# Patient Record
Sex: FEMALE | Race: WHITE | Employment: OTHER | URBAN - METROPOLITAN AREA
[De-identification: names, ages, dates, MRNs, and addresses within clinical notes are randomized per-mention and may not be internally consistent; named-entity substitution may affect disease eponyms.]

---

## 2017-03-28 ENCOUNTER — HOSPITAL ENCOUNTER (OUTPATIENT)
Dept: CARDIOLOGY | Age: 82
Discharge: HOME | End: 2017-03-28
Payer: MEDICARE

## 2017-03-28 PROCEDURE — 93280 PM DEVICE PROGR EVAL DUAL: CPT

## 2017-08-23 ENCOUNTER — HOSPITAL ENCOUNTER (OUTPATIENT)
Dept: CARDIOLOGY | Age: 82
Discharge: HOME | End: 2017-08-23
Payer: MEDICARE

## 2017-08-23 PROCEDURE — 93296 REM INTERROG EVL PM/IDS: CPT

## 2017-11-21 ENCOUNTER — HOSPITAL ENCOUNTER (OUTPATIENT)
Dept: CARDIOLOGY | Age: 82
Discharge: HOME | End: 2017-11-21
Payer: MEDICARE

## 2017-11-21 PROCEDURE — 93280 PM DEVICE PROGR EVAL DUAL: CPT

## 2018-02-21 ENCOUNTER — HOSPITAL ENCOUNTER (OUTPATIENT)
Dept: CARDIOLOGY | Age: 83
Discharge: HOME | End: 2018-02-21
Payer: MEDICARE

## 2018-02-21 PROCEDURE — 93296 REM INTERROG EVL PM/IDS: CPT

## 2018-05-23 ENCOUNTER — HOSPITAL ENCOUNTER (OUTPATIENT)
Dept: CARDIOLOGY | Age: 83
Discharge: HOME | End: 2018-05-23
Payer: MEDICARE

## 2018-05-23 PROCEDURE — 93280 PM DEVICE PROGR EVAL DUAL: CPT

## 2018-07-17 ENCOUNTER — OFFICE VISIT (OUTPATIENT)
Dept: CARDIOLOGY CLINIC | Age: 83
End: 2018-07-17
Payer: MEDICARE

## 2018-07-17 VITALS
WEIGHT: 116 LBS | HEIGHT: 64 IN | OXYGEN SATURATION: 96 % | HEART RATE: 85 BPM | BODY MASS INDEX: 19.81 KG/M2 | SYSTOLIC BLOOD PRESSURE: 128 MMHG | DIASTOLIC BLOOD PRESSURE: 64 MMHG | RESPIRATION RATE: 16 BRPM

## 2018-07-17 DIAGNOSIS — I25.5 ISCHEMIC CARDIOMYOPATHY: ICD-10-CM

## 2018-07-17 DIAGNOSIS — R09.89 BRUIT OF RIGHT CAROTID ARTERY: ICD-10-CM

## 2018-07-17 DIAGNOSIS — E78.00 PURE HYPERCHOLESTEROLEMIA: ICD-10-CM

## 2018-07-17 DIAGNOSIS — I25.10 CORONARY ARTERY DISEASE INVOLVING NATIVE CORONARY ARTERY OF NATIVE HEART WITHOUT ANGINA PECTORIS: Primary | ICD-10-CM

## 2018-07-17 DIAGNOSIS — I10 ESSENTIAL HYPERTENSION: ICD-10-CM

## 2018-07-17 PROCEDURE — 1036F TOBACCO NON-USER: CPT | Performed by: INTERNAL MEDICINE

## 2018-07-17 PROCEDURE — 1090F PRES/ABSN URINE INCON ASSESS: CPT | Performed by: INTERNAL MEDICINE

## 2018-07-17 PROCEDURE — G8599 NO ASA/ANTIPLAT THER USE RNG: HCPCS | Performed by: INTERNAL MEDICINE

## 2018-07-17 PROCEDURE — 1123F ACP DISCUSS/DSCN MKR DOCD: CPT | Performed by: INTERNAL MEDICINE

## 2018-07-17 PROCEDURE — G8427 DOCREV CUR MEDS BY ELIG CLIN: HCPCS | Performed by: INTERNAL MEDICINE

## 2018-07-17 PROCEDURE — 99204 OFFICE O/P NEW MOD 45 MIN: CPT | Performed by: INTERNAL MEDICINE

## 2018-07-17 PROCEDURE — 4040F PNEUMOC VAC/ADMIN/RCVD: CPT | Performed by: INTERNAL MEDICINE

## 2018-07-17 PROCEDURE — 1101F PT FALLS ASSESS-DOCD LE1/YR: CPT | Performed by: INTERNAL MEDICINE

## 2018-07-17 PROCEDURE — G8420 CALC BMI NORM PARAMETERS: HCPCS | Performed by: INTERNAL MEDICINE

## 2018-07-17 RX ORDER — PAROXETINE HYDROCHLORIDE 20 MG/1
20 TABLET, FILM COATED ORAL NIGHTLY
Status: ON HOLD | COMMUNITY
Start: 2017-10-21 | End: 2021-08-09

## 2018-07-17 RX ORDER — LOVASTATIN 40 MG/1
TABLET ORAL
COMMUNITY
End: 2019-04-24

## 2018-07-17 RX ORDER — GABAPENTIN 100 MG/1
100 CAPSULE ORAL 2 TIMES DAILY
Status: ON HOLD | COMMUNITY
Start: 2018-05-29 | End: 2021-08-09

## 2018-07-17 RX ORDER — CARVEDILOL 3.12 MG/1
3.12 TABLET ORAL 2 TIMES DAILY
Status: ON HOLD | COMMUNITY
Start: 2018-06-05 | End: 2021-08-19 | Stop reason: HOSPADM

## 2018-07-17 RX ORDER — EZETIMIBE 10 MG/1
10 TABLET ORAL DAILY
Status: ON HOLD | COMMUNITY
Start: 2018-06-04 | End: 2021-08-09

## 2018-07-30 NOTE — PROGRESS NOTES
[facility] CARDIOLOGY OFFICE FOLLOW-UP      Patient: Arturo De La Torre  YOB: 1928  MRN: [id number]    Chief Complaint:  Chief Complaint   Patient presents with    Coronary Artery Disease     Previous patient       Subjective/HPI    The patient is followed in the office chronically for the following problems: CAD, remote multivessel PCI, SSS S/P PPM, ICM EF 40%, HTN, HPL, LVH    The last office visit focused on the following: first visit in several years, old [address] patient    Since the last office visit, the patient has not had new symptoms/events. No past medical history on file. No past surgical history on file. No family history on file. Social History     Social History    Marital status:      Spouse name: N/A    Number of children: N/A    Years of education: N/A     Social History Main Topics    Smoking status: Former Smoker    Smokeless tobacco: Never Used    Alcohol use None    Drug use: Unknown    Sexual activity: Not Asked     Other Topics Concern    None     Social History Narrative    None       Allergies   Allergen Reactions    Eggs Or Egg-Derived Products     Tylenol [Acetaminophen]        Current Outpatient Prescriptions   Medication Sig Dispense Refill    vitamin D 1000 units CAPS Take 2,000 Units by mouth      ezetimibe (ZETIA) 10 MG tablet Take 10 mg by mouth      metFORMIN (GLUCOPHAGE) 1000 MG tablet Take 1,000 mg by mouth      SITagliptin (JANUVIA) 100 MG tablet Take 100 mg by mouth      insulin glargine (LANTUS) 100 UNIT/ML injection pen INJECT 15 UNITS TWICE A DAY UNDER THE SKIN      lovastatin (MEVACOR) 40 MG tablet Take 0.5 tablet by mouth once daily.  gabapentin (NEURONTIN) 100 MG capsule One in am , 1 in pm and 3 at night.  PARoxetine (PAXIL) 20 MG tablet Take 20 mg by mouth      carvedilol (COREG) 3.125 MG tablet 3.125 mg 2 times daily (with meals)        No current facility-administered medications for this visit.         Review of Systems:   Review of Systems   Constitutional: Negative for activity change and appetite change. HENT: Negative for congestion. Respiratory: Negative for apnea, choking and chest tightness. Cardiovascular: Negative for chest pain. Gastrointestinal: Negative for abdominal distention and abdominal pain. Endocrine: Negative for cold intolerance and heat intolerance. Genitourinary: Negative for dysuria and enuresis. Musculoskeletal: Negative for arthralgias and back pain. Skin: Negative for color change. Allergic/Immunologic: Negative. Neurological: Negative for dizziness, seizures, syncope and light-headedness. Psychiatric/Behavioral: Negative for agitation, behavioral problems and confusion. Physical Examination:    /64 (Site: Left Arm, Position: Sitting, Cuff Size: Large Adult)   Pulse 85   Resp 16   Ht 5' 4\" (1.626 m)   Wt 116 lb (52.6 kg)   SpO2 96%   BMI 19.91 kg/m²    Physical Exam   Constitutional: The patient appears healthy. No distress. HENT: Mouth/Throat: Oropharynx is clear. Eyes: Pupils are equal, round, and reactive to light. Neck: Normal range of motion. No JVD present. Cardiovascular: Regular rhythm, S1 normal, S2 normal, normal heart sounds and intact distal pulses. Exam reveals no gallop. No murmur heard. Pulses:       Radial pulses are 2+ on the right side. Dorsalis pedis pulses are 2+ on the right side. Pulmonary/Chest: Effort normal and breath sounds normal. No wheezes. No rales. No tenderness. Abdominal: Soft. Bowel sounds are normal.   Musculoskeletal: Normal range of motion. No edema. Neurological: The patient is alert and oriented to person, place, and time. Intact cranial nerves. Skin: Skin is warm and dry. No rash noted.        LABS:  CBC:   Lab Results   Component Value Date    WBC 7.2 07/15/2016    RBC 4.28 07/15/2016    HGB 12.0 07/15/2016    HCT 37.2 07/15/2016    MCV 87.1 07/15/2016    MCH 28.1 07/15/2016    [facility] 32.3 07/15/2016    RDW 13.5 07/15/2016     07/15/2016    MPV 7.5 01/31/2014     Lipids:  Lab Results   Component Value Date    CHOL 164 06/14/2016    CHOL 168 05/31/2016     Lab Results   Component Value Date    TRIG 227 (H) 06/14/2016    TRIG 184 05/31/2016     Lab Results   Component Value Date    HDL 42 06/14/2016    HDL 45 05/31/2016     Lab Results   Component Value Date    LDLCALC 77 06/14/2016    LDLCALC 86 05/31/2016     No results found for: LABVLDL, VLDL  No results found for: CHOLHDLRATIO  CMP:    Lab Results   Component Value Date     07/15/2016    K 4.4 07/15/2016    CL 88 07/15/2016    CO2 25 07/15/2016    BUN 10 07/15/2016    CREATININE 1.01 07/15/2016    GFRAA >60.0 07/15/2016    LABGLOM 51.7 07/15/2016    GLUCOSE 105 06/29/2016    PROT 7.3 06/14/2016    LABALBU 4.3 06/29/2016    CALCIUM 10.3 06/29/2016    BILITOT 0.3 06/14/2016    ALKPHOS 87 06/14/2016    AST 23 06/14/2016    ALT 10 06/14/2016     BMP:    Lab Results   Component Value Date     07/15/2016    K 4.4 07/15/2016    CL 88 07/15/2016    CO2 25 07/15/2016    BUN 10 07/15/2016    LABALBU 4.3 06/29/2016    CREATININE 1.01 07/15/2016    CALCIUM 10.3 06/29/2016    GFRAA >60.0 07/15/2016    LABGLOM 51.7 07/15/2016    GLUCOSE 105 06/29/2016     Magnesium:    Lab Results   Component Value Date    MG 2.0 06/14/2016     TSH:  Lab Results   Component Value Date    TSH 4.900 (H) 06/14/2016       Patient Active Problem List   Diagnosis    Ischemic cardiomyopathy    Coronary artery disease involving native coronary artery of native heart without angina pectoris    Essential hypertension    Pure hypercholesterolemia       Assessment/Plan:    1. Coronary artery disease involving native coronary artery of native heart without angina pectoris  Coronary artery disease: This patient has known CAD and remote PCI. Currently the angina class is I.   The patient will be treated with guideline-directed therapy including aspirin, statin,

## 2018-07-31 ENCOUNTER — HOSPITAL ENCOUNTER (OUTPATIENT)
Dept: ULTRASOUND IMAGING | Age: 83
Discharge: HOME | End: 2018-08-02
Payer: MEDICARE

## 2018-07-31 DIAGNOSIS — R09.89 BRUIT OF RIGHT CAROTID ARTERY: ICD-10-CM

## 2018-07-31 PROCEDURE — 93880 EXTRACRANIAL BILAT STUDY: CPT

## 2018-08-23 ENCOUNTER — HOSPITAL ENCOUNTER (OUTPATIENT)
Dept: CARDIOLOGY | Age: 83
Discharge: HOME | End: 2018-08-23
Payer: MEDICARE

## 2018-08-23 PROCEDURE — 93296 REM INTERROG EVL PM/IDS: CPT

## 2018-11-20 ENCOUNTER — HOSPITAL ENCOUNTER (OUTPATIENT)
Dept: CARDIOLOGY | Age: 83
Discharge: HOME | End: 2018-11-20
Payer: MEDICARE

## 2018-11-20 PROCEDURE — 93280 PM DEVICE PROGR EVAL DUAL: CPT

## 2019-01-23 ENCOUNTER — OFFICE VISIT (OUTPATIENT)
Dept: CARDIOLOGY CLINIC | Age: 84
End: 2019-01-23
Payer: MEDICARE

## 2019-01-23 VITALS
DIASTOLIC BLOOD PRESSURE: 58 MMHG | RESPIRATION RATE: 16 BRPM | SYSTOLIC BLOOD PRESSURE: 110 MMHG | OXYGEN SATURATION: 99 % | BODY MASS INDEX: 18.61 KG/M2 | WEIGHT: 109 LBS | HEART RATE: 88 BPM | HEIGHT: 64 IN

## 2019-01-23 DIAGNOSIS — I10 ESSENTIAL HYPERTENSION: ICD-10-CM

## 2019-01-23 DIAGNOSIS — M79.671 FOOT PAIN, BILATERAL: Primary | ICD-10-CM

## 2019-01-23 DIAGNOSIS — M79.672 FOOT PAIN, BILATERAL: Primary | ICD-10-CM

## 2019-01-23 DIAGNOSIS — E78.00 PURE HYPERCHOLESTEROLEMIA: ICD-10-CM

## 2019-01-23 DIAGNOSIS — I25.5 ISCHEMIC CARDIOMYOPATHY: ICD-10-CM

## 2019-01-23 DIAGNOSIS — I25.10 CORONARY ARTERY DISEASE INVOLVING NATIVE CORONARY ARTERY OF NATIVE HEART WITHOUT ANGINA PECTORIS: ICD-10-CM

## 2019-01-23 PROCEDURE — 1090F PRES/ABSN URINE INCON ASSESS: CPT | Performed by: INTERNAL MEDICINE

## 2019-01-23 PROCEDURE — 1123F ACP DISCUSS/DSCN MKR DOCD: CPT | Performed by: INTERNAL MEDICINE

## 2019-01-23 PROCEDURE — 99213 OFFICE O/P EST LOW 20 MIN: CPT | Performed by: INTERNAL MEDICINE

## 2019-01-23 PROCEDURE — G8599 NO ASA/ANTIPLAT THER USE RNG: HCPCS | Performed by: INTERNAL MEDICINE

## 2019-01-23 PROCEDURE — G8420 CALC BMI NORM PARAMETERS: HCPCS | Performed by: INTERNAL MEDICINE

## 2019-01-23 PROCEDURE — 1036F TOBACCO NON-USER: CPT | Performed by: INTERNAL MEDICINE

## 2019-01-23 PROCEDURE — 1101F PT FALLS ASSESS-DOCD LE1/YR: CPT | Performed by: INTERNAL MEDICINE

## 2019-01-23 PROCEDURE — 4040F PNEUMOC VAC/ADMIN/RCVD: CPT | Performed by: INTERNAL MEDICINE

## 2019-01-23 PROCEDURE — G8484 FLU IMMUNIZE NO ADMIN: HCPCS | Performed by: INTERNAL MEDICINE

## 2019-01-23 PROCEDURE — G8427 DOCREV CUR MEDS BY ELIG CLIN: HCPCS | Performed by: INTERNAL MEDICINE

## 2019-01-23 RX ORDER — FERROUS SULFATE 325(65) MG
325 TABLET ORAL
Status: ON HOLD | COMMUNITY
End: 2021-08-09

## 2019-01-23 RX ORDER — THIAMINE HCL 100 MG
500 TABLET ORAL DAILY
Status: ON HOLD | COMMUNITY
End: 2021-08-09

## 2019-02-06 ENCOUNTER — HOSPITAL ENCOUNTER (OUTPATIENT)
Dept: ULTRASOUND IMAGING | Age: 84
Discharge: HOME | End: 2019-02-08
Payer: MEDICARE

## 2019-02-06 DIAGNOSIS — M79.671 FOOT PAIN, BILATERAL: ICD-10-CM

## 2019-02-06 DIAGNOSIS — M79.672 FOOT PAIN, BILATERAL: ICD-10-CM

## 2019-02-06 PROCEDURE — 93924 LWR XTR VASC STDY BILAT: CPT

## 2019-02-06 PROCEDURE — 93924 LWR XTR VASC STDY BILAT: CPT | Performed by: INTERNAL MEDICINE

## 2019-03-26 ENCOUNTER — TELEPHONE (OUTPATIENT)
Dept: CARDIOLOGY CLINIC | Age: 84
End: 2019-03-26

## 2019-04-24 ENCOUNTER — OFFICE VISIT (OUTPATIENT)
Dept: CARDIOLOGY CLINIC | Age: 84
End: 2019-04-24
Payer: MEDICARE

## 2019-04-24 VITALS
HEIGHT: 64 IN | WEIGHT: 108 LBS | DIASTOLIC BLOOD PRESSURE: 58 MMHG | SYSTOLIC BLOOD PRESSURE: 132 MMHG | OXYGEN SATURATION: 98 % | HEART RATE: 88 BPM | RESPIRATION RATE: 16 BRPM | BODY MASS INDEX: 18.44 KG/M2

## 2019-04-24 DIAGNOSIS — E78.00 PURE HYPERCHOLESTEROLEMIA: ICD-10-CM

## 2019-04-24 DIAGNOSIS — I25.10 CORONARY ARTERY DISEASE INVOLVING NATIVE CORONARY ARTERY OF NATIVE HEART WITHOUT ANGINA PECTORIS: ICD-10-CM

## 2019-04-24 DIAGNOSIS — R09.89 BRUIT OF RIGHT CAROTID ARTERY: ICD-10-CM

## 2019-04-24 DIAGNOSIS — I10 ESSENTIAL HYPERTENSION: ICD-10-CM

## 2019-04-24 DIAGNOSIS — M79.672 FOOT PAIN, BILATERAL: ICD-10-CM

## 2019-04-24 DIAGNOSIS — I25.5 ISCHEMIC CARDIOMYOPATHY: Primary | ICD-10-CM

## 2019-04-24 DIAGNOSIS — M79.671 FOOT PAIN, BILATERAL: ICD-10-CM

## 2019-04-24 PROCEDURE — 1090F PRES/ABSN URINE INCON ASSESS: CPT | Performed by: INTERNAL MEDICINE

## 2019-04-24 PROCEDURE — G8427 DOCREV CUR MEDS BY ELIG CLIN: HCPCS | Performed by: INTERNAL MEDICINE

## 2019-04-24 PROCEDURE — G8599 NO ASA/ANTIPLAT THER USE RNG: HCPCS | Performed by: INTERNAL MEDICINE

## 2019-04-24 PROCEDURE — 99213 OFFICE O/P EST LOW 20 MIN: CPT | Performed by: INTERNAL MEDICINE

## 2019-04-24 PROCEDURE — 1123F ACP DISCUSS/DSCN MKR DOCD: CPT | Performed by: INTERNAL MEDICINE

## 2019-04-24 PROCEDURE — 1036F TOBACCO NON-USER: CPT | Performed by: INTERNAL MEDICINE

## 2019-04-24 PROCEDURE — 4040F PNEUMOC VAC/ADMIN/RCVD: CPT | Performed by: INTERNAL MEDICINE

## 2019-04-24 PROCEDURE — G8420 CALC BMI NORM PARAMETERS: HCPCS | Performed by: INTERNAL MEDICINE

## 2019-04-24 RX ORDER — LOVASTATIN 20 MG/1
20 TABLET ORAL NIGHTLY
Status: ON HOLD | COMMUNITY
End: 2021-08-09

## 2019-04-24 NOTE — PROGRESS NOTES
[facility] CARDIOLOGY OFFICE FOLLOW-UP      Patient: Tayo Wells  YOB: 1928  MRN: [id number]    Chief Complaint:  Chief Complaint   Patient presents with    Foot Pain     Review ADONAY       Subjective/HPI    The patient is followed in the cardiology office chronically for the following problems: CAD, remote multivessel PCI, HTN, HPL, DM, ICM EF 40%, sss S/P PPM    The last encounter focused on the following: foot pain    Testing/hospitalizations/procedures performed since last encounter: ADONAY with below knee disease    Since the last encounter, the patient has not had new symptoms/events. No past medical history on file. No past surgical history on file. No family history on file. Social History     Socioeconomic History    Marital status:       Spouse name: None    Number of children: None    Years of education: None    Highest education level: None   Occupational History    None   Social Needs    Financial resource strain: None    Food insecurity:     Worry: None     Inability: None    Transportation needs:     Medical: None     Non-medical: None   Tobacco Use    Smoking status: Former Smoker    Smokeless tobacco: Never Used   Substance and Sexual Activity    Alcohol use: None    Drug use: None    Sexual activity: None   Lifestyle    Physical activity:     Days per week: None     Minutes per session: None    Stress: None   Relationships    Social connections:     Talks on phone: None     Gets together: None     Attends Protestant service: None     Active member of club or organization: None     Attends meetings of clubs or organizations: None     Relationship status: None    Intimate partner violence:     Fear of current or ex partner: None     Emotionally abused: None     Physically abused: None     Forced sexual activity: None   Other Topics Concern    None   Social History Narrative    None       Allergies   Allergen Reactions    Eggs Or Egg-Derived Products  Tylenol [Acetaminophen]        Current Outpatient Medications   Medication Sig Dispense Refill    lovastatin (MEVACOR) 20 MG tablet Take 20 mg by mouth nightly      magnesium (MAGNESIUM-OXIDE) 250 MG TABS tablet Take 250 mg by mouth daily      ferrous sulfate 325 (65 Fe) MG tablet Take 325 mg by mouth daily (with breakfast)      vitamin D 1000 units CAPS Take 2,000 Units by mouth      ezetimibe (ZETIA) 10 MG tablet Take 10 mg by mouth      metFORMIN (GLUCOPHAGE) 1000 MG tablet Take 1,000 mg by mouth 2 times daily (with meals)       SITagliptin (JANUVIA) 100 MG tablet Take 100 mg by mouth      insulin glargine (LANTUS) 100 UNIT/ML injection pen INJECT 10 UNITS  A DAY UNDER THE SKIN      gabapentin (NEURONTIN) 100 MG capsule One in am , 1 in pm and 3 at night.  PARoxetine (PAXIL) 20 MG tablet Take 20 mg by mouth      carvedilol (COREG) 3.125 MG tablet 3.125 mg 2 times daily (with meals)        No current facility-administered medications for this visit. Review of Systems:   Review of Systems   Constitutional: Negative for activity change and appetite change. HENT: Negative for congestion. Respiratory: Negative for apnea, choking and chest tightness. Cardiovascular: Negative for chest pain. Gastrointestinal: Negative for abdominal distention and abdominal pain. Endocrine: Negative for cold intolerance and heat intolerance. Genitourinary: Negative for dysuria and enuresis. Musculoskeletal: Negative for arthralgias and back pain. Skin: Negative for color change. Allergic/Immunologic: Negative. Neurological: Negative for dizziness, seizures, syncope and light-headedness. Psychiatric/Behavioral: Negative for agitation, behavioral problems and confusion.        Physical Examination:    BP (!) 132/58 (Site: Left Upper Arm, Position: Sitting, Cuff Size: Large Adult)   Pulse 88   Resp 16   Ht 5' 4\" (1.626 m)   Wt 108 lb (49 kg)   SpO2 98%   BMI 18.54 kg/m²    Physical BMP:    Lab Results   Component Value Date     07/15/2016    K 4.4 07/15/2016    CL 88 07/15/2016    CO2 25 07/15/2016    BUN 10 07/15/2016    LABALBU 4.3 06/29/2016    CREATININE 1.01 07/15/2016    CALCIUM 10.3 06/29/2016    GFRAA >60.0 07/15/2016    LABGLOM 51.7 07/15/2016    GLUCOSE 105 06/29/2016     Magnesium:    Lab Results   Component Value Date    MG 2.0 06/14/2016     TSH:  Lab Results   Component Value Date    TSH 4.900 (H) 06/14/2016       Patient Active Problem List   Diagnosis    Ischemic cardiomyopathy    Coronary artery disease involving native coronary artery of native heart without angina pectoris    Essential hypertension    Pure hypercholesterolemia       Medications:  Current Outpatient Medications   Medication Sig Dispense Refill    lovastatin (MEVACOR) 20 MG tablet Take 20 mg by mouth nightly      magnesium (MAGNESIUM-OXIDE) 250 MG TABS tablet Take 250 mg by mouth daily      ferrous sulfate 325 (65 Fe) MG tablet Take 325 mg by mouth daily (with breakfast)      vitamin D 1000 units CAPS Take 2,000 Units by mouth      ezetimibe (ZETIA) 10 MG tablet Take 10 mg by mouth      metFORMIN (GLUCOPHAGE) 1000 MG tablet Take 1,000 mg by mouth 2 times daily (with meals)       SITagliptin (JANUVIA) 100 MG tablet Take 100 mg by mouth      insulin glargine (LANTUS) 100 UNIT/ML injection pen INJECT 10 UNITS  A DAY UNDER THE SKIN      gabapentin (NEURONTIN) 100 MG capsule One in am , 1 in pm and 3 at night.  PARoxetine (PAXIL) 20 MG tablet Take 20 mg by mouth      carvedilol (COREG) 3.125 MG tablet 3.125 mg 2 times daily (with meals)        No current facility-administered medications for this visit. Assessment/Plan:    1. Ischemic cardiomyopathy  Chronic stable     2. Coronary artery disease involving native coronary artery of native heart without angina pectoris  No angina    3. Essential hypertension  Patient has essential hypertension on BP medications.   The guideline-directed target for BP in this patient is <130/80. In order to reach our target BP we will continue current BP medications, increasing the dose of current meds or adding new to reach target. 4. Pure hypercholesterolemia  The patient has hyperlipidemia without statin intolerance. The patient will be continued on high intensity statin. The labs are managed by PCP. As per recent guidelines, moderate dose high intensity statin is indicated. 5. Foot pain, bilateral  ADONAY with below knee disease. Due to elderly age, frailty and wish for less invasive and will manage medically. 6. Bruit of right carotid artery       No ulcers or significant discoloration of the feet. DP diminished bilaterally. PT palpable bilateral.  Will treat medically for now. Keep July appointment. Counseling: the patient was counseled regarding diet, exercise, weight control and heart healthy lifestyle. Return in about 6 months (around 10/24/2019). Electronically signed by   Joan Kumar.  Deborah Lester MD [facility] Director of Cardiology Services and Cardiac Catheterization Laboratory  [facility], [place]    on 4/24/2019 at 11:14 AM

## 2019-07-24 ENCOUNTER — OFFICE VISIT (OUTPATIENT)
Dept: CARDIOLOGY CLINIC | Age: 84
End: 2019-07-24
Payer: MEDICARE

## 2019-07-24 VITALS
DIASTOLIC BLOOD PRESSURE: 69 MMHG | BODY MASS INDEX: 18.54 KG/M2 | HEART RATE: 76 BPM | WEIGHT: 108 LBS | SYSTOLIC BLOOD PRESSURE: 141 MMHG | RESPIRATION RATE: 18 BRPM | OXYGEN SATURATION: 96 %

## 2019-07-24 DIAGNOSIS — E78.00 PURE HYPERCHOLESTEROLEMIA: ICD-10-CM

## 2019-07-24 DIAGNOSIS — I10 ESSENTIAL HYPERTENSION: ICD-10-CM

## 2019-07-24 DIAGNOSIS — I25.10 CORONARY ARTERY DISEASE INVOLVING NATIVE CORONARY ARTERY OF NATIVE HEART WITHOUT ANGINA PECTORIS: ICD-10-CM

## 2019-07-24 DIAGNOSIS — I25.5 ISCHEMIC CARDIOMYOPATHY: Primary | ICD-10-CM

## 2019-07-24 PROCEDURE — 1036F TOBACCO NON-USER: CPT | Performed by: INTERNAL MEDICINE

## 2019-07-24 PROCEDURE — 1123F ACP DISCUSS/DSCN MKR DOCD: CPT | Performed by: INTERNAL MEDICINE

## 2019-07-24 PROCEDURE — 1090F PRES/ABSN URINE INCON ASSESS: CPT | Performed by: INTERNAL MEDICINE

## 2019-07-24 PROCEDURE — 4040F PNEUMOC VAC/ADMIN/RCVD: CPT | Performed by: INTERNAL MEDICINE

## 2019-07-24 PROCEDURE — G8427 DOCREV CUR MEDS BY ELIG CLIN: HCPCS | Performed by: INTERNAL MEDICINE

## 2019-07-24 PROCEDURE — G8420 CALC BMI NORM PARAMETERS: HCPCS | Performed by: INTERNAL MEDICINE

## 2019-07-24 PROCEDURE — 93000 ELECTROCARDIOGRAM COMPLETE: CPT | Performed by: INTERNAL MEDICINE

## 2019-07-24 PROCEDURE — 99213 OFFICE O/P EST LOW 20 MIN: CPT | Performed by: INTERNAL MEDICINE

## 2019-07-24 PROCEDURE — G8599 NO ASA/ANTIPLAT THER USE RNG: HCPCS | Performed by: INTERNAL MEDICINE

## 2019-07-24 NOTE — PROGRESS NOTES
Allergen Reactions    Eggs Or Egg-Derived Products     Tylenol [Acetaminophen]        Current Outpatient Medications   Medication Sig Dispense Refill    lovastatin (MEVACOR) 20 MG tablet Take 20 mg by mouth nightly      magnesium (MAGNESIUM-OXIDE) 250 MG TABS tablet Take 250 mg by mouth daily      ferrous sulfate 325 (65 Fe) MG tablet Take 325 mg by mouth daily (with breakfast)      vitamin D 1000 units CAPS Take 2,000 Units by mouth      ezetimibe (ZETIA) 10 MG tablet Take 10 mg by mouth      metFORMIN (GLUCOPHAGE) 1000 MG tablet Take 1,000 mg by mouth 2 times daily (with meals)       SITagliptin (JANUVIA) 100 MG tablet Take 100 mg by mouth      insulin glargine (LANTUS) 100 UNIT/ML injection pen INJECT 10 UNITS  A DAY UNDER THE SKIN      gabapentin (NEURONTIN) 100 MG capsule One in am , 1 in pm and 3 at night.  PARoxetine (PAXIL) 20 MG tablet Take 20 mg by mouth      carvedilol (COREG) 3.125 MG tablet 3.125 mg 2 times daily (with meals)        No current facility-administered medications for this visit. Review of Systems:   Review of Systems   Constitutional: Negative for activity change and appetite change. HENT: Negative for congestion. Respiratory: Negative for apnea, choking and chest tightness. Cardiovascular: Negative for chest pain. Gastrointestinal: Negative for abdominal distention and abdominal pain. Endocrine: Negative for cold intolerance and heat intolerance. Genitourinary: Negative for dysuria and enuresis. Musculoskeletal: Negative for arthralgias and back pain. Skin: Negative for color change. Allergic/Immunologic: Negative. Neurological: Negative for dizziness, seizures, syncope and light-headedness. Psychiatric/Behavioral: Negative for agitation, behavioral problems and confusion.        Physical Examination:    BP (!) 141/69 (Site: Left Upper Arm, Position: Sitting, Cuff Size: Medium Adult)   Pulse 76   Resp 18   Wt 108 lb (49 kg)   SpO2 96%

## 2019-10-01 ENCOUNTER — HOSPITAL ENCOUNTER (OUTPATIENT)
Dept: CARDIOLOGY | Age: 84
Discharge: HOME | End: 2019-10-01
Payer: MEDICARE

## 2019-10-01 PROCEDURE — 93296 REM INTERROG EVL PM/IDS: CPT

## 2020-01-03 ENCOUNTER — HOSPITAL ENCOUNTER (OUTPATIENT)
Dept: CARDIOLOGY | Age: 85
Discharge: HOME | End: 2020-01-03
Payer: MEDICARE

## 2020-01-03 PROCEDURE — 93296 REM INTERROG EVL PM/IDS: CPT

## 2020-01-29 ENCOUNTER — OFFICE VISIT (OUTPATIENT)
Dept: CARDIOLOGY CLINIC | Age: 85
End: 2020-01-29
Payer: MEDICARE

## 2020-01-29 VITALS
OXYGEN SATURATION: 98 % | BODY MASS INDEX: 18.88 KG/M2 | HEART RATE: 68 BPM | WEIGHT: 110 LBS | DIASTOLIC BLOOD PRESSURE: 60 MMHG | SYSTOLIC BLOOD PRESSURE: 100 MMHG

## 2020-01-29 PROCEDURE — 1123F ACP DISCUSS/DSCN MKR DOCD: CPT | Performed by: INTERNAL MEDICINE

## 2020-01-29 PROCEDURE — G8420 CALC BMI NORM PARAMETERS: HCPCS | Performed by: INTERNAL MEDICINE

## 2020-01-29 PROCEDURE — 4040F PNEUMOC VAC/ADMIN/RCVD: CPT | Performed by: INTERNAL MEDICINE

## 2020-01-29 PROCEDURE — 1036F TOBACCO NON-USER: CPT | Performed by: INTERNAL MEDICINE

## 2020-01-29 PROCEDURE — 1090F PRES/ABSN URINE INCON ASSESS: CPT | Performed by: INTERNAL MEDICINE

## 2020-01-29 PROCEDURE — G8484 FLU IMMUNIZE NO ADMIN: HCPCS | Performed by: INTERNAL MEDICINE

## 2020-01-29 PROCEDURE — 99214 OFFICE O/P EST MOD 30 MIN: CPT | Performed by: INTERNAL MEDICINE

## 2020-01-29 PROCEDURE — G8427 DOCREV CUR MEDS BY ELIG CLIN: HCPCS | Performed by: INTERNAL MEDICINE

## 2020-02-02 NOTE — PROGRESS NOTES
PROT 7.3 06/14/2016    LABALBU 4.3 06/29/2016    CALCIUM 10.3 06/29/2016    BILITOT 0.3 06/14/2016    ALKPHOS 87 06/14/2016    AST 23 06/14/2016    ALT 10 06/14/2016     BMP:    Lab Results   Component Value Date     07/15/2016    K 4.4 07/15/2016    CL 88 07/15/2016    CO2 25 07/15/2016    BUN 10 07/15/2016    LABALBU 4.3 06/29/2016    CREATININE 1.01 07/15/2016    CALCIUM 10.3 06/29/2016    GFRAA >60.0 07/15/2016    LABGLOM 51.7 07/15/2016    GLUCOSE 105 06/29/2016     Magnesium:    Lab Results   Component Value Date    MG 2.0 06/14/2016     TSH:  Lab Results   Component Value Date    TSH 4.900 (H) 06/14/2016       Patient Active Problem List   Diagnosis    Ischemic cardiomyopathy    Coronary artery disease involving native coronary artery of native heart without angina pectoris    Essential hypertension    Pure hypercholesterolemia       Medications:  Current Outpatient Medications   Medication Sig Dispense Refill    lovastatin (MEVACOR) 20 MG tablet Take 20 mg by mouth nightly      magnesium (MAGNESIUM-OXIDE) 250 MG TABS tablet Take 250 mg by mouth daily      ferrous sulfate 325 (65 Fe) MG tablet Take 325 mg by mouth daily (with breakfast)      vitamin D 1000 units CAPS Take 2,000 Units by mouth      ezetimibe (ZETIA) 10 MG tablet Take 10 mg by mouth      metFORMIN (GLUCOPHAGE) 1000 MG tablet Take 1,000 mg by mouth 2 times daily (with meals)       SITagliptin (JANUVIA) 100 MG tablet Take 100 mg by mouth      insulin glargine (LANTUS) 100 UNIT/ML injection pen INJECT 10 UNITS  A DAY UNDER THE SKIN      gabapentin (NEURONTIN) 100 MG capsule One in am , 1 in pm and 3 at night.  PARoxetine (PAXIL) 20 MG tablet Take 20 mg by mouth      carvedilol (COREG) 3.125 MG tablet 3.125 mg 2 times daily (with meals)        No current facility-administered medications for this visit. Assessment/Plan:    1. Ischemic cardiomyopathy  compensated    2.  Coronary artery disease involving native coronary artery of native heart without angina pectoris  DAPT, statin, b-blocker and RF modification      3. Essential hypertension  Patient has essential hypertension on BP medications. The guideline-directed target for BP in this patient is <130/80. In order to reach our target BP we will continue current BP medications, increasing the dose of current meds or adding new to reach target. 4. Pure hypercholesterolemia  The patient has hyperlipidemia without statin intolerance. The patient will be continued on high intensity statin. The labs are managed by PCP. As per recent guidelines, moderate dose high intensity statin is indicated. Counseling: the patient was counseled regarding diet, exercise, weight control and heart healthy lifestyle. Return in about 6 months (around 7/29/2020) for followup cv disease. Electronically signed by   Sierra PhotonicsChan Martini MD [facility] Director of Cardiology Services and Cardiac Catheterization Laboratory  [facility], [place]    on 2/1/2020 at 11:25 PM

## 2020-04-07 ENCOUNTER — HOSPITAL ENCOUNTER (OUTPATIENT)
Dept: CARDIOLOGY | Age: 85
Discharge: HOME | End: 2020-04-07
Payer: MEDICARE

## 2020-04-07 PROCEDURE — 93296 REM INTERROG EVL PM/IDS: CPT

## 2020-07-14 ENCOUNTER — HOSPITAL ENCOUNTER (OUTPATIENT)
Dept: CARDIOLOGY | Age: 85
Discharge: HOME | End: 2020-07-14
Payer: MEDICARE

## 2020-07-14 PROCEDURE — 93296 REM INTERROG EVL PM/IDS: CPT

## 2020-07-29 ENCOUNTER — OFFICE VISIT (OUTPATIENT)
Dept: CARDIOLOGY CLINIC | Age: 85
End: 2020-07-29
Payer: MEDICARE

## 2020-07-29 VITALS — DIASTOLIC BLOOD PRESSURE: 76 MMHG | SYSTOLIC BLOOD PRESSURE: 138 MMHG | HEART RATE: 100 BPM

## 2020-07-29 PROCEDURE — 1036F TOBACCO NON-USER: CPT | Performed by: INTERNAL MEDICINE

## 2020-07-29 PROCEDURE — G8427 DOCREV CUR MEDS BY ELIG CLIN: HCPCS | Performed by: INTERNAL MEDICINE

## 2020-07-29 PROCEDURE — 1123F ACP DISCUSS/DSCN MKR DOCD: CPT | Performed by: INTERNAL MEDICINE

## 2020-07-29 PROCEDURE — 4040F PNEUMOC VAC/ADMIN/RCVD: CPT | Performed by: INTERNAL MEDICINE

## 2020-07-29 PROCEDURE — 99213 OFFICE O/P EST LOW 20 MIN: CPT | Performed by: INTERNAL MEDICINE

## 2020-07-29 PROCEDURE — 1090F PRES/ABSN URINE INCON ASSESS: CPT | Performed by: INTERNAL MEDICINE

## 2020-07-29 PROCEDURE — G8420 CALC BMI NORM PARAMETERS: HCPCS | Performed by: INTERNAL MEDICINE

## 2020-07-29 PROCEDURE — 93000 ELECTROCARDIOGRAM COMPLETE: CPT | Performed by: INTERNAL MEDICINE

## 2020-07-29 NOTE — PROGRESS NOTES
Topics Concern    Not on file   Social History Narrative    Not on file       Allergies   Allergen Reactions    Eggs Or Egg-Derived Products     Tylenol [Acetaminophen]        Current Outpatient Medications   Medication Sig Dispense Refill    lovastatin (MEVACOR) 20 MG tablet Take 20 mg by mouth nightly      magnesium (MAGNESIUM-OXIDE) 250 MG TABS tablet Take 250 mg by mouth daily      ferrous sulfate 325 (65 Fe) MG tablet Take 325 mg by mouth daily (with breakfast)      vitamin D 1000 units CAPS Take 2,000 Units by mouth      ezetimibe (ZETIA) 10 MG tablet Take 10 mg by mouth      metFORMIN (GLUCOPHAGE) 1000 MG tablet Take 1,000 mg by mouth 2 times daily (with meals)       SITagliptin (JANUVIA) 100 MG tablet Take 100 mg by mouth      insulin glargine (LANTUS) 100 UNIT/ML injection pen INJECT 10 UNITS  A DAY UNDER THE SKIN      gabapentin (NEURONTIN) 100 MG capsule One in am , 1 in pm and 3 at night.  PARoxetine (PAXIL) 20 MG tablet Take 20 mg by mouth      carvedilol (COREG) 3.125 MG tablet 3.125 mg 2 times daily (with meals)        No current facility-administered medications for this visit. Review of Systems:   Review of Systems   Constitutional: Negative for activity change and appetite change. HENT: Negative for congestion. Respiratory: Negative for apnea, choking and chest tightness. Cardiovascular: Negative for chest pain. Gastrointestinal: Negative for abdominal distention and abdominal pain. Endocrine: Negative for cold intolerance and heat intolerance. Genitourinary: Negative for dysuria and enuresis. Musculoskeletal: Negative for arthralgias and back pain. Skin: Negative for color change. Allergic/Immunologic: Negative. Neurological: Negative for dizziness, seizures, syncope and light-headedness. Psychiatric/Behavioral: Negative for agitation, behavioral problems and confusion.        Physical Examination:    /76 (Site: Left Upper Arm, Position: 06/14/2016    AST 23 06/14/2016    ALT 10 06/14/2016     BMP:    Lab Results   Component Value Date     07/15/2016    K 4.4 07/15/2016    CL 88 07/15/2016    CO2 25 07/15/2016    BUN 10 07/15/2016    LABALBU 4.3 06/29/2016    CREATININE 1.01 07/15/2016    CALCIUM 10.3 06/29/2016    GFRAA >60.0 07/15/2016    LABGLOM 51.7 07/15/2016    GLUCOSE 105 06/29/2016     Magnesium:    Lab Results   Component Value Date    MG 2.0 06/14/2016     TSH:  Lab Results   Component Value Date    TSH 4.900 (H) 06/14/2016       Patient Active Problem List   Diagnosis    Ischemic cardiomyopathy    Coronary artery disease involving native coronary artery of native heart without angina pectoris    Essential hypertension    Pure hypercholesterolemia       Medications:  Current Outpatient Medications   Medication Sig Dispense Refill    lovastatin (MEVACOR) 20 MG tablet Take 20 mg by mouth nightly      magnesium (MAGNESIUM-OXIDE) 250 MG TABS tablet Take 250 mg by mouth daily      ferrous sulfate 325 (65 Fe) MG tablet Take 325 mg by mouth daily (with breakfast)      vitamin D 1000 units CAPS Take 2,000 Units by mouth      ezetimibe (ZETIA) 10 MG tablet Take 10 mg by mouth      metFORMIN (GLUCOPHAGE) 1000 MG tablet Take 1,000 mg by mouth 2 times daily (with meals)       SITagliptin (JANUVIA) 100 MG tablet Take 100 mg by mouth      insulin glargine (LANTUS) 100 UNIT/ML injection pen INJECT 10 UNITS  A DAY UNDER THE SKIN      gabapentin (NEURONTIN) 100 MG capsule One in am , 1 in pm and 3 at night.  PARoxetine (PAXIL) 20 MG tablet Take 20 mg by mouth      carvedilol (COREG) 3.125 MG tablet 3.125 mg 2 times daily (with meals)        No current facility-administered medications for this visit. Assessment/Plan:    1.  Coronary artery disease involving native coronary artery of native heart without angina pectoris  Stable no angina  - EKG 12 Lead       Counseling: the patient was counseled regarding diet, exercise, weight control and heart healthy lifestyle. Return in about 6 months (around 1/29/2021) for followup cv disease. Electronically signed by   An Obrien.  Rhonda Faye MD [facility] Director of Cardiology Services and Cardiac Catheterization Laboratory  [facility], [place]    on 8/16/2020 at 3:49 PM

## 2020-10-13 ENCOUNTER — HOSPITAL ENCOUNTER (OUTPATIENT)
Dept: CARDIOLOGY | Age: 85
Discharge: HOME | End: 2020-10-13
Payer: MEDICARE

## 2020-10-13 PROCEDURE — 93296 REM INTERROG EVL PM/IDS: CPT

## 2021-01-12 ENCOUNTER — HOSPITAL ENCOUNTER (OUTPATIENT)
Dept: CARDIOLOGY | Age: 86
Discharge: HOME | End: 2021-01-12
Payer: MEDICARE

## 2021-01-12 PROCEDURE — 93296 REM INTERROG EVL PM/IDS: CPT

## 2021-04-20 ENCOUNTER — HOSPITAL ENCOUNTER (EMERGENCY)
Age: 86
Discharge: HOME | End: 2021-04-20
Attending: EMERGENCY MEDICINE
Payer: MEDICARE

## 2021-04-20 ENCOUNTER — APPOINTMENT (OUTPATIENT)
Dept: GENERAL RADIOLOGY | Age: 86
End: 2021-04-20
Payer: MEDICARE

## 2021-04-20 VITALS
SYSTOLIC BLOOD PRESSURE: 163 MMHG | HEART RATE: 94 BPM | OXYGEN SATURATION: 99 % | BODY MASS INDEX: 18.78 KG/M2 | DIASTOLIC BLOOD PRESSURE: 86 MMHG | HEIGHT: 64 IN | RESPIRATION RATE: 17 BRPM | TEMPERATURE: 98.1 F | WEIGHT: 110 LBS

## 2021-04-20 DIAGNOSIS — M25.552 LEFT HIP PAIN: Primary | ICD-10-CM

## 2021-04-20 DIAGNOSIS — M25.50 ARTHRALGIA, UNSPECIFIED JOINT: ICD-10-CM

## 2021-04-20 LAB
ALBUMIN SERPL-MCNC: 4.3 G/DL (ref 3.5–4.6)
ALP BLD-CCNC: 53 U/L (ref 40–130)
ALT SERPL-CCNC: 7 U/L (ref 0–33)
ANION GAP SERPL CALCULATED.3IONS-SCNC: 13 MEQ/L (ref 9–15)
ANISOCYTOSIS: ABNORMAL
AST SERPL-CCNC: 17 U/L (ref 0–35)
BANDED NEUTROPHILS RELATIVE PERCENT: 1 % (ref 5–11)
BASOPHILS ABSOLUTE: 0 K/UL (ref 0–0.2)
BASOPHILS RELATIVE PERCENT: 1.2 %
BILIRUB SERPL-MCNC: 0.4 MG/DL (ref 0.2–0.7)
BILIRUBIN URINE: NEGATIVE
BLOOD, URINE: NEGATIVE
BUN BLDV-MCNC: 20 MG/DL (ref 8–23)
C-REACTIVE PROTEIN: 0.4 MG/L (ref 0–5)
CALCIUM SERPL-MCNC: 10.3 MG/DL (ref 8.5–9.9)
CHLORIDE BLD-SCNC: 97 MEQ/L (ref 95–107)
CLARITY: CLEAR
CO2: 27 MEQ/L (ref 20–31)
COLOR: YELLOW
CREAT SERPL-MCNC: 0.94 MG/DL (ref 0.5–0.9)
EKG ATRIAL RATE: 98 BPM
EKG P AXIS: 67 DEGREES
EKG P-R INTERVAL: 196 MS
EKG Q-T INTERVAL: 414 MS
EKG QRS DURATION: 156 MS
EKG QTC CALCULATION (BAZETT): 528 MS
EKG R AXIS: -68 DEGREES
EKG T AXIS: 95 DEGREES
EKG VENTRICULAR RATE: 98 BPM
EOSINOPHILS ABSOLUTE: 0 K/UL (ref 0–0.7)
EOSINOPHILS RELATIVE PERCENT: 1.6 %
GFR AFRICAN AMERICAN: >60
GFR NON-AFRICAN AMERICAN: 55.6
GLOBULIN: 2.8 G/DL (ref 2.3–3.5)
GLUCOSE BLD-MCNC: 266 MG/DL (ref 70–99)
GLUCOSE URINE: 500 MG/DL
HCT VFR BLD CALC: 39.2 % (ref 37–47)
HEMOGLOBIN: 13.1 G/DL (ref 12–16)
KETONES, URINE: ABNORMAL MG/DL
LEUKOCYTE ESTERASE, URINE: NEGATIVE
LYMPHOCYTES ABSOLUTE: 2.1 K/UL (ref 1–4.8)
LYMPHOCYTES RELATIVE PERCENT: 29 %
MAGNESIUM: 1.3 MG/DL (ref 1.7–2.4)
MCH RBC QN AUTO: 29.9 PG (ref 27–31.3)
MCHC RBC AUTO-ENTMCNC: 33.4 % (ref 33–37)
MCV RBC AUTO: 89.4 FL (ref 82–100)
MICROCYTES: ABNORMAL
MONOCYTES ABSOLUTE: 0.7 K/UL (ref 0.2–0.8)
MONOCYTES RELATIVE PERCENT: 8.5 %
NEUTROPHILS ABSOLUTE: 4.6 K/UL (ref 1.4–6.5)
NEUTROPHILS RELATIVE PERCENT: 61 %
NITRITE, URINE: NEGATIVE
PDW BLD-RTO: 13.7 % (ref 11.5–14.5)
PH UA: 6 (ref 5–9)
PLATELET # BLD: 211 K/UL (ref 130–400)
PLATELET SLIDE REVIEW: NORMAL
POTASSIUM SERPL-SCNC: 4.3 MEQ/L (ref 3.4–4.9)
PROTEIN UA: ABNORMAL MG/DL
RBC # BLD: 4.39 M/UL (ref 4.2–5.4)
SEDIMENTATION RATE, ERYTHROCYTE: 18 MM (ref 0–30)
SMUDGE CELLS: 0.9
SODIUM BLD-SCNC: 137 MEQ/L (ref 135–144)
SPECIFIC GRAVITY UA: 1.01 (ref 1–1.03)
TOTAL PROTEIN: 7.1 G/DL (ref 6.3–8)
TROPONIN: 0.01 NG/ML (ref 0–0.01)
URINE REFLEX TO CULTURE: ABNORMAL
UROBILINOGEN, URINE: 0.2 E.U./DL
WBC # BLD: 7.4 K/UL (ref 4.8–10.8)

## 2021-04-20 PROCEDURE — 81003 URINALYSIS AUTO W/O SCOPE: CPT

## 2021-04-20 PROCEDURE — 2580000003 HC RX 258: Performed by: EMERGENCY MEDICINE

## 2021-04-20 PROCEDURE — 93005 ELECTROCARDIOGRAM TRACING: CPT | Performed by: EMERGENCY MEDICINE

## 2021-04-20 PROCEDURE — 85652 RBC SED RATE AUTOMATED: CPT

## 2021-04-20 PROCEDURE — 73502 X-RAY EXAM HIP UNI 2-3 VIEWS: CPT

## 2021-04-20 PROCEDURE — 86140 C-REACTIVE PROTEIN: CPT

## 2021-04-20 PROCEDURE — 6360000002 HC RX W HCPCS: Performed by: EMERGENCY MEDICINE

## 2021-04-20 PROCEDURE — 36415 COLL VENOUS BLD VENIPUNCTURE: CPT

## 2021-04-20 PROCEDURE — 93010 ELECTROCARDIOGRAM REPORT: CPT | Performed by: INTERNAL MEDICINE

## 2021-04-20 PROCEDURE — 84484 ASSAY OF TROPONIN QUANT: CPT

## 2021-04-20 PROCEDURE — 80053 COMPREHEN METABOLIC PANEL: CPT

## 2021-04-20 PROCEDURE — 96365 THER/PROPH/DIAG IV INF INIT: CPT

## 2021-04-20 PROCEDURE — 99285 EMERGENCY DEPT VISIT HI MDM: CPT

## 2021-04-20 PROCEDURE — 85025 COMPLETE CBC W/AUTO DIFF WBC: CPT

## 2021-04-20 PROCEDURE — 83735 ASSAY OF MAGNESIUM: CPT

## 2021-04-20 RX ORDER — SODIUM CHLORIDE 9 MG/ML
INJECTION, SOLUTION INTRAVENOUS CONTINUOUS
Status: DISCONTINUED | OUTPATIENT
Start: 2021-04-20 | End: 2021-04-20 | Stop reason: HOSPADM

## 2021-04-20 RX ORDER — MAGNESIUM SULFATE IN WATER 40 MG/ML
2000 INJECTION, SOLUTION INTRAVENOUS ONCE
Status: COMPLETED | OUTPATIENT
Start: 2021-04-20 | End: 2021-04-20

## 2021-04-20 RX ADMIN — MAGNESIUM SULFATE HEPTAHYDRATE 2000 MG: 40 INJECTION, SOLUTION INTRAVENOUS at 14:14

## 2021-04-20 RX ADMIN — SODIUM CHLORIDE: 9 INJECTION, SOLUTION INTRAVENOUS at 12:07

## 2021-04-20 ASSESSMENT — ENCOUNTER SYMPTOMS
EYES NEGATIVE: 1
RESPIRATORY NEGATIVE: 1
GASTROINTESTINAL NEGATIVE: 1
ALLERGIC/IMMUNOLOGIC NEGATIVE: 1

## 2021-04-20 ASSESSMENT — PAIN DESCRIPTION - DESCRIPTORS: DESCRIPTORS: ACHING

## 2021-04-20 ASSESSMENT — PAIN DESCRIPTION - LOCATION: LOCATION: HIP

## 2021-04-20 ASSESSMENT — PAIN DESCRIPTION - ORIENTATION: ORIENTATION: LEFT

## 2021-04-20 NOTE — ED NOTES
Called lab to check on Sed Rate result. Will be another 15 minutes.       Silvana Hargrove RN  04/20/21 [id number]

## 2021-04-20 NOTE — ED NOTES
Pt ambulated without pain down the wilson to the bathroom with this RN walking with her. Pt sat on toilet and then stood up by herself without pain. Urine sample obtained. Got back into bed per herself. Placed back on monitor. Magnesium hung. Dr Ryland Childs made aware and in room to talk with family.      Sandra Lester RN  04/20/21 [id number]

## 2021-04-20 NOTE — ED NOTES
D/C instructions given to patient and family. All verbalized understanding. Pt denies any complaints. Pt dressed per self with minimal assist. Ambulated to bathroom to empty bladder prior to family taking her out to eat. Pt declined wheelchair. Daughter pulled car up while granddaughter walked patient out from 2B bathroom to exit.       Rudy Stover RN  04/20/21 4802

## 2021-04-20 NOTE — ED NOTES
Grazyna placed. Pt states she's not sure if she can urinate at this time.       Silvana Hargrove RN  04/20/21 [id number]

## 2021-04-20 NOTE — ED NOTES
Bed: 22  Expected date:   Expected time:   Means of arrival:   Comments:  89 f left hip pain.  /82, 109 hr, rr 18, and 98% ra     April SHARON Jones RN  04/20/21 [id number]

## 2021-04-20 NOTE — ED NOTES
Returned. Placed back onto monitor. Family at bedside. Daysick placed back on suction. Pt trying to urinate but states she doesn't think she can still. Call light at bedside. Aware we are waiting on results.      Ricardo hBatt RN  04/20/21 [id number]

## 2021-04-20 NOTE — ED TRIAGE NOTES
A & ox4. Skin pink warm and dry. Pt very [place]. Daughter states she went over to take her grocery shopping. Told her she was in too much pain to go shopping. Complains of left hip pain for 3 weeks, but didn't tell family until today. Pt denies falling or any injury. No shortening or rotation noted. Pedal pulse palpable. No obvious injury/deformity/ecchymosis noted.

## 2021-04-20 NOTE — ED PROVIDER NOTES
[address] [facility] ED  eMERGENCY dEPARTMENT eNCOUnter      Pt Name: Leroy Stuart  MRN: [id number]  [place] 5/4/1928  Date of evaluation: 4/20/2021  Provider: Katrina Prasad MD    [address] [place] [address]       Chief Complaint   Patient presents with    Hip Pain     left hip, no trauma         HISTORY OF PRESENT ILLNESS   (Location/Symptom, Timing/Onset,Context/Setting, Quality, Duration, Modifying Factors, Severity)  Note limiting factors. Leroy Stuart is a 80 y.o. female who presents to the emergency department by ambulance with a complaint of left hip pain. She has been having this for 3 weeks. Movement makes it worse rest makes it better no fever or chills. Patient is extremely hard of hearing. History as per the daughter. No trauma history. Patient has had previous surgery on that left hip before. No abdominal pain chest pain headache blurry double vision numbness or tingling in the arms or legs. Movement makes it worse rest makes it better weightbearing makes it worse currently mild but with weightbearing becomes moderate dull and achy in nature. HPI    NursingNotes were reviewed. REVIEW OF SYSTEMS    (2-9 systems for level 4, 10 or more for level 5)     Review of Systems   Constitutional: Negative. HENT: Negative. Eyes: Negative. Respiratory: Negative. Cardiovascular: Negative. Gastrointestinal: Negative. Endocrine: Negative. Genitourinary: Negative. Musculoskeletal: Positive for arthralgias and gait problem. Allergic/Immunologic: Negative. Neurological:        No neurological deficits noted. Patient has no neurological complaints   Hematological: Negative. Psychiatric/Behavioral: Negative. Except as noted above the remainder of the review of systems was reviewed and negative.        PAST MEDICAL HISTORY     Past Medical History:   Diagnosis Date    Anemia     Atrial fibrillation ([address]     CAD (coronary artery disease)     Cardiomyopathy ([address]     [facility]     Diabetes mellitus ([facility]     Hypertension          SURGICALHISTORY       Past Surgical History:   Procedure Laterality Date    APPENDECTOMY      CORONARY ANGIOPLASTY WITH STENT PLACEMENT      HIP FRACTURE SURGERY Left 12/2012    PACEMAKER INSERTION      TONSILLECTOMY      WRIST FRACTURE SURGERY Left 12/2012         CURRENT MEDICATIONS       Current Discharge Medication List      CONTINUE these medications which have NOT CHANGED    Details   lovastatin (MEVACOR) 20 MG tablet Take 20 mg by mouth nightly      magnesium (MAGNESIUM-OXIDE) 250 MG TABS tablet Take 250 mg by mouth daily      ferrous sulfate 325 (65 Fe) MG tablet Take 325 mg by mouth daily (with breakfast)      vitamin D 1000 units CAPS Take 2,000 Units by mouth      ezetimibe (ZETIA) 10 MG tablet Take 10 mg by mouth      metFORMIN (GLUCOPHAGE) 1000 MG tablet Take 1,000 mg by mouth 2 times daily (with meals)       SITagliptin (JANUVIA) 100 MG tablet Take 100 mg by mouth      insulin glargine (LANTUS) 100 UNIT/ML injection pen INJECT 10 UNITS  A DAY UNDER THE SKIN      gabapentin (NEURONTIN) 100 MG capsule One in am , 1 in pm and 3 at night. PARoxetine (PAXIL) 20 MG tablet Take 20 mg by mouth      carvedilol (COREG) 3.125 MG tablet 3.125 mg 2 times daily (with meals)              ALLERGIES     Eggs or egg-derived products and Tylenol [acetaminophen]    FAMILY HISTORY     History reviewed. No pertinent family history. SOCIAL HISTORY       Social History     Socioeconomic History    Marital status:       Spouse name: None    Number of children: None    Years of education: None    Highest education level: None   Occupational History    None   Social Needs    Financial resource strain: None    Food insecurity     Worry: None     Inability: None    Transportation needs     Medical: None     Non-medical: None   Tobacco Use    Smoking status: Former Smoker    Smokeless tobacco: Never Used Substance and Sexual Activity    Alcohol use: Not Currently    Drug use: Never    Sexual activity: None   Lifestyle    Physical activity     Days per week: None     Minutes per session: None    Stress: None   Relationships    Social connections     Talks on phone: None     Gets together: None     Attends Ep[employer] service: None     Active member of club or organization: None     Attends meetings of clubs or organizations: None     Relationship status: None    Intimate partner violence     Fear of current or ex partner: None     Emotionally abused: None     Physically abused: None     Forced sexual activity: None   Other Topics Concern    None   Social History Narrative    None       SCREENINGS      @FLOW([id number])@      PHYSICAL EXAM    (up to 7 for level 4, 8 or more for level 5)     ED Triage Vitals   BP Temp Temp Source Pulse Resp SpO2 Height Weight   04/20/21 1143 04/20/21 1143 04/20/21 1143 04/20/21 1140 04/20/21 1140 04/20/21 1140 04/20/21 1140 04/20/21 1140   (!) 164/96 98.1 °F (36.7 °C) Oral 108 14 100 % 5' 4\" (1.626 m) 110 lb (49.9 kg)       Physical Exam  HENT:      Head: Atraumatic. Nose: Nose normal.      Mouth/Throat:      Mouth: Mucous membranes are moist.   Eyes:      Conjunctiva/sclera: Conjunctivae normal.   Neck:      Musculoskeletal: Normal range of motion. Cardiovascular:      Rate and Rhythm: Normal rate. Pulses: Normal pulses. Pulmonary:      Effort: Pulmonary effort is normal.   Abdominal:      Palpations: Abdomen is soft. Musculoskeletal: Normal range of motion. Skin:     General: Skin is warm. Capillary Refill: Capillary refill takes less than 2 seconds. Neurological:      General: No focal deficit present. Mental Status: She is alert.    Psychiatric:         Mood and Affect: Mood normal.         DIAGNOSTIC RESULTS     EKG: All EKG's are interpreted by the Emergency Department Physician who either signs or Co-signsthis chart in the absence of a cardiologist.    EKG shows atrial sensed ventricular paced rhythm. 98 bpm NV interval 196 ms QRS duration 156 ms QT is 414 ms    RADIOLOGY:   Non-plain filmimages such as CT, Ultrasound and MRI are read by the radiologist. Plain radiographic images are visualized and preliminarily interpreted by the emergency physician with the below findings:        Interpretation per the Radiologist below, if available at the time ofthis note:    XR HIP LEFT (2-3 VIEWS)   Final Result      No acute findings. ED BEDSIDE ULTRASOUND:   Performed by ED Physician - none    LABS:  Labs Reviewed   COMPREHENSIVE METABOLIC PANEL - Abnormal; Notable for the following components:       Result Value    Glucose 266 (*)     CREATININE 0.94 (*)     GFR Non- 55.6 (*)     Calcium 10.3 (*)     All other components within normal limits   CBC WITH AUTO DIFFERENTIAL - Abnormal; Notable for the following components:    Bands Relative 1 (*)     All other components within normal limits   URINE RT REFLEX TO CULTURE - Abnormal; Notable for the following components:    Glucose, Ur 500 (*)     Ketones, Urine TRACE (*)     Protein, UA TRACE (*)     All other components within normal limits   TROPONIN - Abnormal; Notable for the following components:    Troponin 0.014 (*)     All other components within normal limits    Narrative:     Niels Agapito tel. [phone],  TROP results called to and read back by Dillon Pena, 04/20/2021 12:57, by  Radha Alfaro   MAGNESIUM - Abnormal; Notable for the following components:    Magnesium 1.3 (*)     All other components within normal limits   SEDIMENTATION RATE   C-REACTIVE PROTEIN       All other labs were within normal range or not returned as of this dictation.     EMERGENCY DEPARTMENT COURSE and DIFFERENTIAL DIAGNOSIS/MDM:   Vitals:    Vitals:    04/20/21 1235 04/20/21 1300 04/20/21 1330 04/20/21 1430   BP: (!) 156/83 (!) 157/92 (!) 172/84 (!) 163/86   Pulse: 98 97 100 94   Resp: 18 13 23 17 Temp:       TempSrc:       SpO2: 100% 100% 100% 99%   Weight:       Height:               MDM    Patient was walked to the bathroom with no pain whatsoever was walking quite well with no difficulty. Was able to sit down on the toilet without difficulty and raise her self back up without difficulty. The exact cause of this patient's pain is unclear but it is now gone. Sed rate and blood work were unremarkable. X-rays were unremarkable. There was a very mild elevation in the troponin but I believe this is secondary to the patient's pacemaker and not coronary artery disease related. No further testing needed patient in good and stable condition for discharge home. Patient has a follow-up appointment this coming Friday with the primary care physician. CONSULTS:  None    PROCEDURES:  Unless otherwise noted below, none     Procedures    FINAL IMPRESSION      1. Left hip pain    2.  Arthralgia, unspecified joint          DISPOSITION/PLAN   DISPOSITION Decision To Discharge 04/20/2021 02:26:41 PM      PATIENT REFERRED TO:  Elida Aguirre MD  [address] [place], [place] [id number] [phone]    In 2 days      [place] [address]  [phone]    In 2 days        DISCHARGE MEDICATIONS:  Current Discharge Medication List             (Please note that portions of this note were completed with a voice recognition program.  Efforts were made to edit the dictations but occasionally words are mis-transcribed.)    Roland Roblero MD (electronically signed)  Attending Emergency Physician        Roland Roblero MD  04/20/21

## 2021-04-27 ENCOUNTER — HOSPITAL ENCOUNTER (OUTPATIENT)
Dept: CARDIOLOGY | Age: 86
Discharge: HOME | End: 2021-04-27
Payer: MEDICARE

## 2021-04-27 PROCEDURE — 93293 PM PHONE R-STRIP DEVICE EVAL: CPT

## 2021-05-11 ENCOUNTER — APPOINTMENT (OUTPATIENT)
Dept: GENERAL RADIOLOGY | Age: 86
DRG: 511 | End: 2021-05-11
Payer: MEDICARE

## 2021-05-11 ENCOUNTER — APPOINTMENT (OUTPATIENT)
Dept: CT IMAGING | Age: 86
DRG: 511 | End: 2021-05-11
Payer: MEDICARE

## 2021-05-11 ENCOUNTER — HOSPITAL ENCOUNTER (INPATIENT)
Age: 86
LOS: 2 days | Discharge: NURSING, REHAB OR LONG-TERM CARE | DRG: 511 | End: 2021-05-15
Attending: ORTHOPAEDIC SURGERY | Admitting: ORTHOPAEDIC SURGERY
Payer: MEDICARE

## 2021-05-11 DIAGNOSIS — S82.034A CLOSED NONDISPLACED TRANSVERSE FRACTURE OF RIGHT PATELLA, INITIAL ENCOUNTER: ICD-10-CM

## 2021-05-11 DIAGNOSIS — S52.501A CLOSED FRACTURE OF DISTAL ENDS OF RIGHT RADIUS AND ULNA, INITIAL ENCOUNTER: Primary | ICD-10-CM

## 2021-05-11 DIAGNOSIS — S52.601A CLOSED FRACTURE OF DISTAL ENDS OF RIGHT RADIUS AND ULNA, INITIAL ENCOUNTER: Primary | ICD-10-CM

## 2021-05-11 DIAGNOSIS — G89.18 POST-OP PAIN: ICD-10-CM

## 2021-05-11 PROBLEM — S82.034D: Status: ACTIVE | Noted: 2021-05-11

## 2021-05-11 LAB — SARS-COV-2, NAAT: NOT DETECTED

## 2021-05-11 PROCEDURE — 73700 CT LOWER EXTREMITY W/O DYE: CPT

## 2021-05-11 PROCEDURE — 6370000000 HC RX 637 (ALT 250 FOR IP): Performed by: STUDENT IN AN ORGANIZED HEALTH CARE EDUCATION/TRAINING PROGRAM

## 2021-05-11 PROCEDURE — G0378 HOSPITAL OBSERVATION PER HR: HCPCS

## 2021-05-11 PROCEDURE — 87635 SARS-COV-2 COVID-19 AMP PRB: CPT

## 2021-05-11 PROCEDURE — 73562 X-RAY EXAM OF KNEE 3: CPT

## 2021-05-11 PROCEDURE — 70486 CT MAXILLOFACIAL W/O DYE: CPT

## 2021-05-11 PROCEDURE — 99285 EMERGENCY DEPT VISIT HI MDM: CPT

## 2021-05-11 PROCEDURE — 70450 CT HEAD/BRAIN W/O DYE: CPT

## 2021-05-11 PROCEDURE — 73110 X-RAY EXAM OF WRIST: CPT

## 2021-05-11 PROCEDURE — 73130 X-RAY EXAM OF HAND: CPT

## 2021-05-11 RX ORDER — NICOTINE POLACRILEX 4 MG
15 LOZENGE BUCCAL PRN
Status: DISCONTINUED | OUTPATIENT
Start: 2021-05-11 | End: 2021-05-15 | Stop reason: HOSPADM

## 2021-05-11 RX ORDER — TRAMADOL HYDROCHLORIDE 50 MG/1
50 TABLET ORAL ONCE
Status: COMPLETED | OUTPATIENT
Start: 2021-05-11 | End: 2021-05-11

## 2021-05-11 RX ORDER — DEXTROSE MONOHYDRATE 25 G/50ML
12.5 INJECTION, SOLUTION INTRAVENOUS PRN
Status: DISCONTINUED | OUTPATIENT
Start: 2021-05-11 | End: 2021-05-15 | Stop reason: HOSPADM

## 2021-05-11 RX ORDER — ONDANSETRON 4 MG/1
4 TABLET, ORALLY DISINTEGRATING ORAL EVERY 8 HOURS PRN
Status: DISCONTINUED | OUTPATIENT
Start: 2021-05-11 | End: 2021-05-15 | Stop reason: HOSPADM

## 2021-05-11 RX ORDER — SODIUM CHLORIDE 0.9 % (FLUSH) 0.9 %
5-40 SYRINGE (ML) INJECTION EVERY 12 HOURS SCHEDULED
Status: DISCONTINUED | OUTPATIENT
Start: 2021-05-11 | End: 2021-05-15 | Stop reason: HOSPADM

## 2021-05-11 RX ORDER — SODIUM CHLORIDE 0.9 % (FLUSH) 0.9 %
5-40 SYRINGE (ML) INJECTION PRN
Status: DISCONTINUED | OUTPATIENT
Start: 2021-05-11 | End: 2021-05-15 | Stop reason: HOSPADM

## 2021-05-11 RX ORDER — SODIUM CHLORIDE 9 MG/ML
25 INJECTION, SOLUTION INTRAVENOUS PRN
Status: DISCONTINUED | OUTPATIENT
Start: 2021-05-11 | End: 2021-05-15 | Stop reason: HOSPADM

## 2021-05-11 RX ORDER — MORPHINE SULFATE 4 MG/ML
4 INJECTION, SOLUTION INTRAMUSCULAR; INTRAVENOUS
Status: DISCONTINUED | OUTPATIENT
Start: 2021-05-11 | End: 2021-05-15 | Stop reason: HOSPADM

## 2021-05-11 RX ORDER — TRAMADOL HYDROCHLORIDE 50 MG/1
50 TABLET ORAL EVERY 4 HOURS PRN
Status: DISCONTINUED | OUTPATIENT
Start: 2021-05-11 | End: 2021-05-15 | Stop reason: HOSPADM

## 2021-05-11 RX ORDER — DEXTROSE MONOHYDRATE 50 MG/ML
100 INJECTION, SOLUTION INTRAVENOUS PRN
Status: DISCONTINUED | OUTPATIENT
Start: 2021-05-11 | End: 2021-05-15 | Stop reason: HOSPADM

## 2021-05-11 RX ADMIN — TRAMADOL HYDROCHLORIDE 50 MG: 50 TABLET, FILM COATED ORAL at 19:04

## 2021-05-11 ASSESSMENT — ENCOUNTER SYMPTOMS
NAUSEA: 0
TROUBLE SWALLOWING: 0
WHEEZING: 0
SHORTNESS OF BREATH: 0
CHEST TIGHTNESS: 0
COUGH: 0
CONSTIPATION: 0
VOMITING: 0
SORE THROAT: 0
DIARRHEA: 0
ABDOMINAL PAIN: 0
PHOTOPHOBIA: 0
EYES NEGATIVE: 1

## 2021-05-11 ASSESSMENT — PAIN DESCRIPTION - LOCATION: LOCATION: WRIST;KNEE

## 2021-05-11 ASSESSMENT — VISUAL ACUITY: OU: 1

## 2021-05-11 ASSESSMENT — PAIN DESCRIPTION - PAIN TYPE: TYPE: ACUTE PAIN

## 2021-05-11 ASSESSMENT — PAIN SCALES - GENERAL: PAINLEVEL_OUTOF10: 7

## 2021-05-11 NOTE — ED PROVIDER NOTES
[address] [facility] ED  EMERGENCY DEPARTMENT ENCOUNTER      Pt Name: Dunia Saul  MRN: [id number]  [place] 5/4/1928  Date of evaluation: 5/11/2021  Provider: Nena Hassan Dr       Chief Complaint   Patient presents with    Fall         HISTORY OF PRESENT ILLNESS   (Location/Symptom, Timing/Onset, Context/Setting, Quality, Duration, Modifying Factors, Severity)  Note limiting factors. Dunia Saul is a 80 y.o. female who per chart reviews a past medical history of anemia A. fib no anticoagulation CAD cardiomyopathy CHF type 2 diabetes and hypertension presents to the emergency department for evaluation of mechanical fall from standing that occurred prior to arrival.  Patient states that she tripped over a large potted plant landing with a [address] mechanism of the right hand and onto the right knee. She also hit the right side of her chin sustaining small abrasion. There is no loss of consciousness. No blood thinners. Patient states that knee pain is worse with ambulation. She is not able to bear weight on the knee. Deformity to the right wrist.  Patient did not experience any chest pain dizziness or shortness of breath prior to fall. Patient typically ambulates with a cane while outside of the home and without any assistance when in the home. She lives alone. Patient's daughter is at bedside to assist with history as patient is extremely hard of hearing. She denies any chest pain shortness of breath neck or back pain hip pain abdominal pain headache dizziness visual changes numbness tingling weakness nausea vomiting. Rating pain at a 7 out of 10 at this time. HPI    Nursing Notes were reviewed. REVIEW OF SYSTEMS    (2-9 systems for level 4, 10 or more for level 5)     Review of Systems   Constitutional: Negative for chills and fever. HENT: Negative for congestion. Eyes: Negative for photophobia.    Respiratory: Negative for cough, shortness of breath and wheezing. Cardiovascular: Negative for chest pain and palpitations. Gastrointestinal: Negative for abdominal pain, nausea and vomiting. Genitourinary: Negative for dysuria, frequency and hematuria. Musculoskeletal: Positive for arthralgias (Right knee and right wrist pain). Negative for myalgias. Allergic/Immunologic: Negative for immunocompromised state. Neurological: Negative for dizziness, weakness and headaches. All other systems reviewed and are negative. Except as noted above the remainder of the review of systems was reviewed and negative. PAST MEDICAL HISTORY     Past Medical History:   Diagnosis Date    Anemia     [facility]     CAD (coronary artery disease)     Cardiomyopathy ([facility]     [facility]     [facility]     Hypertension          SURGICAL HISTORY       Past Surgical History:   Procedure Laterality Date    APPENDECTOMY      CORONARY ANGIOPLASTY WITH STENT PLACEMENT      HIP FRACTURE SURGERY Left 12/2012    PACEMAKER INSERTION      TONSILLECTOMY      WRIST FRACTURE SURGERY Left 12/2012         CURRENT MEDICATIONS       Previous Medications    CARVEDILOL (COREG) 3.125 MG TABLET    3.125 mg 2 times daily (with meals)     EZETIMIBE (ZETIA) 10 MG TABLET    Take 10 mg by mouth    FERROUS SULFATE 325 (65 FE) MG TABLET    Take 325 mg by mouth daily (with breakfast)    GABAPENTIN (NEURONTIN) 100 MG CAPSULE    One in am , 1 in pm and 3 at night.     INSULIN GLARGINE (LANTUS) 100 UNIT/ML INJECTION PEN    INJECT 10 UNITS  A DAY UNDER THE SKIN    LOVASTATIN (MEVACOR) 20 MG TABLET    Take 20 mg by mouth nightly    MAGNESIUM (MAGNESIUM-OXIDE) 250 MG TABS TABLET    Take 250 mg by mouth daily    METFORMIN (GLUCOPHAGE) 1000 MG TABLET    Take 1,000 mg by mouth 2 times daily (with meals)     PAROXETINE (PAXIL) 20 MG TABLET    Take 20 mg by mouth    SITAGLIPTIN (JANUVIA) 100 MG TABLET    Take 100 mg by mouth    VITAMIN D 1000 UNITS CAPS    Take 2,000 Units by mouth       ALLERGIES     Eggs or egg-derived products and Tylenol [acetaminophen]    FAMILY HISTORY     History reviewed. No pertinent family history. SOCIAL HISTORY       Social History     Socioeconomic History    Marital status:      Spouse name: None    Number of children: None    Years of education: None    Highest education level: None   Occupational History    None   Social Needs    Financial resource strain: None    Food insecurity     Worry: None     Inability: None    Transportation needs     Medical: None     Non-medical: None   Tobacco Use    Smoking status: Former Smoker    Smokeless tobacco: Never Used   Substance and Sexual Activity    Alcohol use: Not Currently    Drug use: Never    Sexual activity: None   Lifestyle    Physical activity     Days per week: None     Minutes per session: None    Stress: None   Relationships    Social connections     Talks on phone: None     Gets together: None     Attends Christian service: None     Active member of club or organization: None     Attends meetings of clubs or organizations: None     Relationship status: None    Intimate partner violence     Fear of current or ex partner: None     Emotionally abused: None     Physically abused: None     Forced sexual activity: None   Other Topics Concern    None   Social History Narrative    None       SCREENINGS        Alexander Coma Scale  Eye Opening: Spontaneous  Best Verbal Response: Oriented  Best Motor Response: Obeys commands  Alexander Coma Scale Score: 15               PHYSICAL EXAM    (up to 7 for level 4, 8 or more for level 5)     ED Triage Vitals [05/11/21 1835]   BP Temp Temp Source Pulse Resp SpO2 Height Weight   130/69 97.5 °F (36.4 °C) Oral 105 16 96 % 5' 6\" (1.676 m) 100 lb (45.4 kg)       Physical Exam  Constitutional:       General: She is not in acute distress. Appearance: She is well-developed.  She is not ill-appearing, toxic-appearing or diaphoretic. HENT:      Head: Normocephalic. Abrasion present. No raccoon eyes, Oneill's sign, contusion, right periorbital erythema, left periorbital erythema or laceration. Jaw: There is normal jaw occlusion. Comments: No bony step-off or crepitus of the skull. Right Ear: No hemotympanum. Left Ear: No hemotympanum. Nose: Nose normal. No signs of injury or nasal tenderness. Mouth/Throat:      Lips: Pink. Mouth: Mucous membranes are moist.      Comments: No oral cavity trauma  Eyes:      General: Lids are normal. Vision grossly intact. Gaze aligned appropriately. Extraocular Movements: Extraocular movements intact. Pupils: Pupils are equal, round, and reactive to light. Neck:      Musculoskeletal: Normal range of motion. Cardiovascular:      Rate and Rhythm: Normal rate and regular rhythm. Heart sounds: No murmur. No friction rub. No gallop. Pulmonary:      Effort: Pulmonary effort is normal.      Breath sounds: Normal breath sounds. No wheezing, rhonchi or rales. Abdominal:      General: Bowel sounds are normal. There is no distension. Palpations: Abdomen is soft. Tenderness: There is no abdominal tenderness. Musculoskeletal:         General: No swelling. Right shoulder: Normal.      Right elbow: Normal.     Right wrist: She exhibits decreased range of motion, tenderness, bony tenderness, swelling and deformity. She exhibits no effusion, no crepitus and no laceration. Right hip: Normal.      Left hip: Normal.      Right knee: She exhibits decreased range of motion. She exhibits no swelling, no effusion, no ecchymosis, no deformity, no laceration, no erythema, normal alignment, no LCL laxity, normal patellar mobility, no bony tenderness, normal meniscus and no MCL laxity. Tenderness found. Lateral joint line tenderness noted. No medial joint line, no MCL, no LCL and no patellar tendon tenderness noted.       Left knee: Normal.      Right ankle: Normal.      Left ankle: Normal.      Cervical back: Normal.      Thoracic back: Normal.      Lumbar back: Normal.      Right lower leg: No edema. Left lower leg: No edema. Comments: No midline tenderness bony step-off or crepitus C-spine to L-spine. Skin:     General: Skin is warm and dry. Capillary Refill: Capillary refill takes less than 2 seconds. Findings: No rash. Neurological:      General: No focal deficit present. Mental Status: She is alert and oriented to person, place, and time. GCS: GCS eye subscore is 4. GCS verbal subscore is 5. GCS motor subscore is 6. Cranial Nerves: Cranial nerves are intact. Sensory: Sensation is intact. Motor: Motor function is intact. Coordination: Coordination is intact. DIAGNOSTIC RESULTS     EKG: All EKG's are interpreted by the Emergency Department Physician who either signs or Co-signs this chart in the absence of a cardiologist.      RADIOLOGY:   Non-plain film images such as CT, Ultrasound and MRI are read by the radiologist. Plain radiographic images are visualized and preliminarily interpreted by the emergency physician with the below findings:        Interpretation per the Radiologist below, if available at the time of this note:    CT HEAD WO CONTRAST   Final Result      CT FACIAL BONES WO CONTRAST   Final Result      XR WRIST RIGHT (MIN 3 VIEWS)    (Results Pending)   XR HAND RIGHT (MIN 3 VIEWS)    (Results Pending)   XR KNEE RIGHT (3 VIEWS)    (Results Pending)         ED BEDSIDE ULTRASOUND:   Performed by ED Physician - none    LABS:  Labs Reviewed   COVID-19, RAPID       All other labs were within normal range or not returned as of this dictation.     EMERGENCY DEPARTMENT COURSE and DIFFERENTIAL DIAGNOSIS/MDM:   Vitals:    Vitals:    05/11/21 1835 05/11/21 2000   BP: 130/69 127/71   Pulse: 105 87   Resp: 16 16   Temp: 97.5 °F (36.4 °C)    TempSrc: Oral    SpO2: 96% 97% Weight: 100 lb (45.4 kg)    Height: 5' 6\" (1.676 m)        MDM     Patient is a 59-year-old female who presents to the ED for evaluation of mechanical fall from standing, R knee pain and R wrist pain with noted deformity. She is afebrile and tachycardic to 105 on arrival.  She was given p.o. tramadol in the ED. CT head and facial bones negative for acute abnormality. Xray R wrist + for Colles fracture. Xray R knee + for patellar fracture. Pt given knee immobilizer and placed in sugar tong splint. Pt unable to ambulate and lives alone therefore patient will be admitted. Spoke with Dr. Domenica Ro of ortho who will accept the patient to his service for admission. Transition orders will be placed. HR improved to 87. Pt stable for admission. REASSESSMENT          CRITICAL CARE TIME   Total Critical Care time was 0 minutes, excluding separately reportable procedures. There was a high probability of clinically significant/life threatening deterioration in the patient's condition which required my urgent intervention. CONSULTS:  IP CONSULT TO HOSPITALIST    PROCEDURES:  Unless otherwise noted below, none     Procedures        FINAL IMPRESSION      1. Closed fracture of distal ends of right radius and ulna, initial encounter    2. Closed nondisplaced transverse fracture of right patella, initial encounter          DISPOSITION/PLAN   DISPOSITION Admitted 05/11/2021 08:26:35 PM      PATIENT REFERRED TO:  No follow-up provider specified. DISCHARGE MEDICATIONS:  New Prescriptions    No medications on file     Controlled Substances Monitoring:     No flowsheet data found.     (Please note that portions of this note were completed with a voice recognition program.  Efforts were made to edit the dictations but occasionally words are mis-transcribed.)    Miki Hunt PA-C (electronically signed)             Miki Hunt PA-C  05/11/21 8341

## 2021-05-12 LAB
ALBUMIN SERPL-MCNC: 3.8 G/DL (ref 3.5–4.6)
ALP BLD-CCNC: 52 U/L (ref 40–130)
ALT SERPL-CCNC: 7 U/L (ref 0–33)
ANION GAP SERPL CALCULATED.3IONS-SCNC: 10 MEQ/L (ref 9–15)
APTT: 31.1 SEC (ref 24.4–36.8)
AST SERPL-CCNC: 15 U/L (ref 0–35)
BASOPHILS ABSOLUTE: 0 K/UL (ref 0–0.2)
BASOPHILS RELATIVE PERCENT: 0.5 %
BILIRUB SERPL-MCNC: 0.3 MG/DL (ref 0.2–0.7)
BUN BLDV-MCNC: 25 MG/DL (ref 8–23)
CALCIUM SERPL-MCNC: 10.1 MG/DL (ref 8.5–9.9)
CHLORIDE BLD-SCNC: 95 MEQ/L (ref 95–107)
CO2: 29 MEQ/L (ref 20–31)
CREAT SERPL-MCNC: 1.23 MG/DL (ref 0.5–0.9)
EOSINOPHILS ABSOLUTE: 0.1 K/UL (ref 0–0.7)
EOSINOPHILS RELATIVE PERCENT: 1.4 %
GFR AFRICAN AMERICAN: 49.3
GFR NON-AFRICAN AMERICAN: 40.7
GLOBULIN: 2.1 G/DL (ref 2.3–3.5)
GLUCOSE BLD-MCNC: 110 MG/DL (ref 60–115)
GLUCOSE BLD-MCNC: 259 MG/DL (ref 60–115)
GLUCOSE BLD-MCNC: 301 MG/DL (ref 60–115)
GLUCOSE BLD-MCNC: 305 MG/DL (ref 60–115)
GLUCOSE BLD-MCNC: 89 MG/DL (ref 60–115)
GLUCOSE BLD-MCNC: 94 MG/DL (ref 70–99)
HCT VFR BLD CALC: 32.2 % (ref 37–47)
HEMOGLOBIN: 10.8 G/DL (ref 12–16)
INR BLD: 1.1
LV EF: 50 %
LVEF MODALITY: NORMAL
LYMPHOCYTES ABSOLUTE: 1.6 K/UL (ref 1–4.8)
LYMPHOCYTES RELATIVE PERCENT: 22.3 %
MCH RBC QN AUTO: 30 PG (ref 27–31.3)
MCHC RBC AUTO-ENTMCNC: 33.5 % (ref 33–37)
MCV RBC AUTO: 89.7 FL (ref 82–100)
MONOCYTES ABSOLUTE: 0.7 K/UL (ref 0.2–0.8)
MONOCYTES RELATIVE PERCENT: 10 %
NEUTROPHILS ABSOLUTE: 4.9 K/UL (ref 1.4–6.5)
NEUTROPHILS RELATIVE PERCENT: 65.8 %
PDW BLD-RTO: 13.3 % (ref 11.5–14.5)
PERFORMED ON: ABNORMAL
PERFORMED ON: NORMAL
PERFORMED ON: NORMAL
PLATELET # BLD: 183 K/UL (ref 130–400)
POTASSIUM REFLEX MAGNESIUM: 3.6 MEQ/L (ref 3.4–4.9)
PROTHROMBIN TIME: 14.1 SEC (ref 12.3–14.9)
RBC # BLD: 3.59 M/UL (ref 4.2–5.4)
SODIUM BLD-SCNC: 134 MEQ/L (ref 135–144)
TOTAL PROTEIN: 5.9 G/DL (ref 6.3–8)
WBC # BLD: 7.4 K/UL (ref 4.8–10.8)

## 2021-05-12 PROCEDURE — 6370000000 HC RX 637 (ALT 250 FOR IP): Performed by: ORTHOPAEDIC SURGERY

## 2021-05-12 PROCEDURE — APPSS45 APP SPLIT SHARED TIME 31-45 MINUTES: Performed by: PHYSICIAN ASSISTANT

## 2021-05-12 PROCEDURE — G0378 HOSPITAL OBSERVATION PER HR: HCPCS

## 2021-05-12 PROCEDURE — 80053 COMPREHEN METABOLIC PANEL: CPT

## 2021-05-12 PROCEDURE — 85730 THROMBOPLASTIN TIME PARTIAL: CPT

## 2021-05-12 PROCEDURE — 85025 COMPLETE CBC W/AUTO DIFF WBC: CPT

## 2021-05-12 PROCEDURE — 96372 THER/PROPH/DIAG INJ SC/IM: CPT

## 2021-05-12 PROCEDURE — 99204 OFFICE O/P NEW MOD 45 MIN: CPT | Performed by: INTERNAL MEDICINE

## 2021-05-12 PROCEDURE — 85610 PROTHROMBIN TIME: CPT

## 2021-05-12 PROCEDURE — 36415 COLL VENOUS BLD VENIPUNCTURE: CPT

## 2021-05-12 PROCEDURE — 93306 TTE W/DOPPLER COMPLETE: CPT

## 2021-05-12 PROCEDURE — 2580000003 HC RX 258: Performed by: ORTHOPAEDIC SURGERY

## 2021-05-12 PROCEDURE — 93005 ELECTROCARDIOGRAM TRACING: CPT | Performed by: INTERNAL MEDICINE

## 2021-05-12 PROCEDURE — 6370000000 HC RX 637 (ALT 250 FOR IP): Performed by: NURSE PRACTITIONER

## 2021-05-12 PROCEDURE — 6360000002 HC RX W HCPCS: Performed by: ORTHOPAEDIC SURGERY

## 2021-05-12 RX ORDER — LANOLIN ALCOHOL/MO/W.PET/CERES
400 CREAM (GRAM) TOPICAL DAILY
Status: DISCONTINUED | OUTPATIENT
Start: 2021-05-12 | End: 2021-05-15 | Stop reason: HOSPADM

## 2021-05-12 RX ORDER — CARVEDILOL 3.12 MG/1
3.12 TABLET ORAL 2 TIMES DAILY WITH MEALS
Status: DISCONTINUED | OUTPATIENT
Start: 2021-05-12 | End: 2021-05-15 | Stop reason: HOSPADM

## 2021-05-12 RX ORDER — EZETIMIBE 10 MG/1
10 TABLET ORAL NIGHTLY
Status: DISCONTINUED | OUTPATIENT
Start: 2021-05-13 | End: 2021-05-15 | Stop reason: HOSPADM

## 2021-05-12 RX ORDER — MULTIVIT WITH MINERALS/LUTEIN
250 TABLET ORAL DAILY
Status: ON HOLD | COMMUNITY
End: 2021-08-09 | Stop reason: ALTCHOICE

## 2021-05-12 RX ORDER — ATORVASTATIN CALCIUM 10 MG/1
10 TABLET, FILM COATED ORAL DAILY
Status: DISCONTINUED | OUTPATIENT
Start: 2021-05-12 | End: 2021-05-15 | Stop reason: HOSPADM

## 2021-05-12 RX ORDER — DONEPEZIL HYDROCHLORIDE 5 MG/1
5 TABLET, FILM COATED ORAL NIGHTLY
Status: ON HOLD | COMMUNITY
End: 2022-08-29

## 2021-05-12 RX ORDER — PAROXETINE HYDROCHLORIDE 20 MG/1
20 TABLET, FILM COATED ORAL DAILY
Status: DISCONTINUED | OUTPATIENT
Start: 2021-05-12 | End: 2021-05-15 | Stop reason: HOSPADM

## 2021-05-12 RX ORDER — ASPIRIN 81 MG/1
81 TABLET ORAL DAILY
Status: ON HOLD | COMMUNITY
End: 2021-08-09

## 2021-05-12 RX ADMIN — SODIUM CHLORIDE, PRESERVATIVE FREE 10 ML: 5 INJECTION INTRAVENOUS at 21:03

## 2021-05-12 RX ADMIN — TRAMADOL HYDROCHLORIDE 50 MG: 50 TABLET, FILM COATED ORAL at 12:16

## 2021-05-12 RX ADMIN — CARVEDILOL 3.12 MG: 3.12 TABLET, FILM COATED ORAL at 21:02

## 2021-05-12 RX ADMIN — ATORVASTATIN CALCIUM 10 MG: 10 TABLET, FILM COATED ORAL at 21:02

## 2021-05-12 RX ADMIN — CARVEDILOL 3.12 MG: 3.12 TABLET, FILM COATED ORAL at 10:21

## 2021-05-12 RX ADMIN — ENOXAPARIN SODIUM 40 MG: 40 INJECTION SUBCUTANEOUS at 10:21

## 2021-05-12 RX ADMIN — PAROXETINE HYDROCHLORIDE 20 MG: 20 TABLET, FILM COATED ORAL at 10:22

## 2021-05-12 RX ADMIN — INSULIN LISPRO 6 UNITS: 100 INJECTION, SOLUTION INTRAVENOUS; SUBCUTANEOUS at 16:54

## 2021-05-12 RX ADMIN — SODIUM CHLORIDE, PRESERVATIVE FREE 10 ML: 5 INJECTION INTRAVENOUS at 10:22

## 2021-05-12 RX ADMIN — INSULIN LISPRO 8 UNITS: 100 INJECTION, SOLUTION INTRAVENOUS; SUBCUTANEOUS at 12:09

## 2021-05-12 RX ADMIN — Medication 400 MG: at 10:21

## 2021-05-12 ASSESSMENT — ENCOUNTER SYMPTOMS
VOMITING: 0
CHEST TIGHTNESS: 0
COLOR CHANGE: 0
NAUSEA: 0
SHORTNESS OF BREATH: 0

## 2021-05-12 ASSESSMENT — PAIN SCALES - GENERAL: PAINLEVEL_OUTOF10: 2

## 2021-05-12 NOTE — ED NOTES
Report called to [facility]N. Transportation notified. Patient and family updated.      Stephanie Nix RN  05/11/21 5503

## 2021-05-12 NOTE — PROGRESS NOTES
Hospitalist Progress Note      Date of Admission: 5/11/2021  Chief Complaint:    Chief Complaint   Patient presents with    Fall     Subjective:  No new complaints. No nausea, vomiting, chest pain, or headache      Medications:    Infusion Medications    sodium chloride      dextrose       Scheduled Medications    carvedilol  3.125 mg Oral BID     [START ON 5/13/2021] ezetimibe  10 mg Oral Nightly    atorvastatin  10 mg Oral Daily    magnesium oxide  400 mg Oral Daily    PARoxetine  20 mg Oral Daily    sodium chloride flush  5-40 mL Intravenous 2 times per day    enoxaparin  40 mg Subcutaneous Daily    insulin lispro  0-12 Units Subcutaneous TID     insulin lispro  0-6 Units Subcutaneous Nightly     PRN Meds: sodium chloride flush, sodium chloride, traMADol, morphine, ondansetron, HYDROmorphone, glucose, dextrose, glucagon (rDNA), dextrose  No intake or output data in the 24 hours ending 05/12/21 1136  Exam:  BP (!) 141/64   Pulse 94   Temp 97.9 °F (36.6 °C)   Resp 18   Ht 5' 6\" (1.676 m)   Wt 100 lb (45.4 kg)   SpO2 97%   BMI 16.14 kg/m²   Head: Normocephalic, atraumatic  Sclera clear  Neck JVD flat  Lungs: normal effort of breathing    Labs:   Recent Labs     05/12/21  0559   WBC 7.4   HGB 10.8*   HCT 32.2*        Recent Labs     05/12/21  0559   *   K 3.6   CL 95   CO2 29   BUN 25*   CREATININE 1.23*   CALCIUM 10.1*   AST 15   ALT 7   BILITOT 0.3   ALKPHOS 52     Recent Labs     05/12/21  0559   INR 1.1     No results for input(s): CKTOTAL, TROPONINI in the last 72 hours. Radiology:  CT KNEE RIGHT WO CONTRAST   Final Result      Acute nondisplaced fracture involving the patella with associated moderate lipohemarthrosis. . No other distinct fractures. XR WRIST RIGHT (MIN 3 VIEWS)   Final Result      Complex distal radius fracture with intra-articular extension. Ulnar styloid fracture.       XR HAND RIGHT (MIN 3 VIEWS)   Final Result      Complex distal radius fracture with intra-articular extension. Ulnar styloid fracture. XR KNEE RIGHT (3 VIEWS)   Final Result      Acute transverse nondisplaced fracture involving the patella with associated lipohemarthrosis. CT HEAD WO CONTRAST   Final Result      CT FACIAL BONES WO CONTRAST   Final Result        Assessment/Plan:    Radius, patella fracture    Cardiac clearance, history of CAD, cardiomyopathy, conduction abnormalities/arrhythmias are being addressed by cardiology. Hypertension: Current systolic recent trend 772O    Diabetes: Last glucose improved from 300s to 100. Continue to monitor glucose, watch for hypoglycemia.     35 minutes total care time, >1/2 in unit/floor time and care coordination     Ramiro Marroquin MD

## 2021-05-12 NOTE — FLOWSHEET NOTE
2230 Patient arrived to unit Room 221 Via cot, slide patient over to bed, patient tolerated well. Oriented to room, call light with in reach. Patient is very Hard of Hearing. 2341 Admission assessment complete. VSS. A/Ox3. Patient is very hard of hearing and at times does not understand what I am asking. Patient unable to review medication list nor answer admission questions. Knee immobilizer to right knee in place, splint to right wrist in place. Patient states no pain at this time. 0000 Called daughter Tung Muhammad [phone] phone call went straight to voice mail. Left message to call back so we can review admission questions and home medication list with her.

## 2021-05-12 NOTE — CARE COORDINATION
LSW spoke with pt and her daughter regarding her DC plan. Pt will have surgery tomorrow and she will need rehab prior to returning home. Pt normally lives alone and manages well at home. We discussed options and [facility] choice would be [facility] as that is closest to the daughter. [facility] would be second choice. Referral called to [facility].   Message also left for Anahi.

## 2021-05-12 NOTE — H&P
Ortho  h & p dict  Npo for sx  Risk vs benefit discussed  Site marked and identified  xrs reviewed    Non sx rx rt patella (wbat no rom knee immobilizer)  Sx for rt distal radius fx with rb tomorrow    Medical clearance  Pending
Compartments are all soft. Skin is  intact. She can move her fingers. There is no carpal tunnel type  syndrome. The wrist was splinted and otherwise comfortable. Right knee ecchymosis and bruising noted, but is fully extended. Minimal to no pain with rotation. CAT scan, x-rays, and imaging shows a  nondisplaced comminuted inferior pole of patellar fracture that will be  treated nonsurgically. IMPRESSION AND PLAN:  Patella fracture for nonsurgical treatment, distal  Colles' fracture. The patient will be medically stabilized. Once  medical management is maximized, she will be on the operative schedule  for a plating. We anticipate then she will be able to possibly use a  platform walker. She will be able to weightbear on the knee without  bending the knee and then she will have fixation of the distal radius  fracture. We look forward to see her on the operative schedule. Risks  and benefits uniquely discussed with this fixation pattern, this  multiple fracture pattern, and with fixation of fractures in the elderly  including, but not limited to wear, loosening, infection, blood clot,  DVT, loss of limb or life, delayed recovery, stiffness, need for further  surgery, and associated surgical risk during the coronavirus.         Brennan Anderson MD    D: 05/12/2021 8:08:16       T: 05/12/2021 8:15:46     [email or web address]  Job#: [id number]     Doc#: [id number]    CC:    (Retain this field even if not dictated or not decipherable)

## 2021-05-12 NOTE — CARE COORDINATION
[facility] Case Management Initial Discharge Assessment    Met with Patient and Family to discuss discharge plan. PCP: Jorge Leiva MD                                Date of Last Visit: 4/23/21    If no PCP, list provided? N/A    Discharge Planning    Living Arrangements: independently at home    Who do you live with? self    Who helps you with your care:  self or family    If lives at home:     Do you have any barriers navigating in your home? no    Patient can perform ADL? Yes    Current Services (outpatient and in home) : She had hhc through [facility] and states that she was to have a PT visit \"but they never showed up\"    Dialysis: No    Is transportation available to get to your appointments? Yes    DME Equipment:  yes - cane    Respiratory equipment: None    Respiratory provider:  no     [facility]    Consult with Medication Assistance Program?  No        Does Patient Have a High-Risk for Readmission Diagnosis (CHF, PN, MI, COPD)? No    Initial Discharge Plan? (Note: please see concurrent daily documentation for any updates after initial note). Pt's daughter states that pt had [facility] hhc and that would be the choice if hhc is needed. For rehab, she had been at [facility] and that would be the choice if that is needed. I gave her a list of rehab and snf facilities in case [facility] would not be able to take her. CM to assess for further d/c needs and referrals.     Readmission Risk              Risk of Unplanned Readmission:        0         Electronically signed by Isauro Yañez on 5/11/2021 at 8:48 PM

## 2021-05-12 NOTE — CONSULTS
Consult Note    Reason for Consult:  Management of chronic conditions. Requesting Physician:  Fadi Lewis MD    HISTORY OF PRESENT ILLNESS:    The patient is a 80 y.o. female who presents with trauma from fall. Complains of pain right wrist and right knee. She cannot ambulate. XRays showed fracture right patella and right wrist colles fracture. Admitted by Dr. Reggie Shepard   Knee immobilized and wrist splinted. We are consulted to address her chronic medical conditions which include CAD, cardiomyopathy, DM2, HTN, HLD. DM2 - insulin dependent and poorly controlled w A1c >8 and daughter states BS at times >300. She takes lantus, metformin    Cardiomyopathy   Last EF 40%,   No recent admission for heart failure    Believe cardiomyopathy ischemic in nature w history CAD s/p stent placement   She takes betablocker and statin     CAD s/p stent,  On BB, statin  No chest pain. Anemia -  Chronic  On iron supplement daily  H/H 13.1 / 39.2 in April of this year. No bleeding or bruising. HLD - w/ history CAD and DM2. On mevacor, zetia     HTN -  On BB. /71       She has PPM - CHB. Past Medical History:   Diagnosis Date    Anemia     Atrial fibrillation (HCC)     CAD (coronary artery disease)     Cardiomyopathy ([address]     CHF (congestive heart failure) (HCC)     Diabetes mellitus ([facility]     Hypertension        Past Surgical History:   Procedure Laterality Date    APPENDECTOMY      CORONARY ANGIOPLASTY WITH STENT PLACEMENT      HIP FRACTURE SURGERY Left 12/2012    PACEMAKER INSERTION      TONSILLECTOMY      WRIST FRACTURE SURGERY Left 12/2012       Prior to Admission medications    Medication Sig Start Date End Date Taking?  Authorizing Provider   lovastatin (MEVACOR) 20 MG tablet Take 20 mg by mouth nightly    Historical Provider, MD   magnesium (MAGNESIUM-OXIDE) 250 MG TABS tablet Take 250 mg by mouth daily    Historical Provider, MD   ferrous sulfate 325 (65 Fe) MG tablet Take 325 mg by mouth daily (with breakfast)    Historical Provider, MD   vitamin D 1000 units CAPS Take 2,000 Units by mouth 12/26/12   Historical Provider, MD   ezetimibe (ZETIA) 10 MG tablet Take 10 mg by mouth 6/4/18   Historical Provider, MD   metFORMIN (GLUCOPHAGE) 1000 MG tablet Take 1,000 mg by mouth 2 times daily (with meals)  5/30/18   Historical Provider, MD   SITagliptin (JANUVIA) 100 MG tablet Take 100 mg by mouth 5/30/18   Historical Provider, MD   insulin glargine (LANTUS) 100 UNIT/ML injection pen INJECT [address] 5/29/18   Historical Provider, MD   gabapentin (NEURONTIN) 100 MG capsule One in am , 1 in pm and 3 at night. 5/29/18   Historical Provider, MD   PARoxetine (PAXIL) 20 MG tablet Take 20 mg by mouth 10/21/17   Historical Provider, MD   carvedilol (COREG) 3.125 MG tablet 3.125 mg 2 times daily (with meals)  6/5/18   Historical Provider, MD       Scheduled Meds:   sodium chloride flush  5-40 mL Intravenous 2 times per day    [START ON 5/12/2021] enoxaparin  40 mg Subcutaneous Daily     Continuous Infusions:   sodium chloride       PRN Meds:sodium chloride flush, sodium chloride, traMADol, morphine, ondansetron, HYDROmorphone    Allergies   Allergen Reactions    Eggs Or Egg-Derived Products     Tylenol [Acetaminophen]        Social History     Socioeconomic History    Marital status:       Spouse name: Not on file    Number of children: Not on file    Years of education: Not on file    Highest education level: Not on file   Occupational History    Not on file   Social Needs    Financial resource strain: Not on file    Food insecurity     Worry: Not on file     Inability: Not on file    Transportation needs     Medical: Not on file     Non-medical: Not on file   Tobacco Use    Smoking status: Former Smoker    Smokeless tobacco: Never Used   Substance and Sexual Activity    Alcohol use: Not Currently    Drug use: Never    Sexual activity: Not on file   Lifestyle    Physical activity     Days per week: Not on file     Minutes per session: Not on file    Stress: Not on file   Relationships    Social connections     Talks on phone: Not on file     Gets together: Not on file     Attends Mu-ism service: Not on file     Active member of club or organization: Not on file     Attends meetings of clubs or organizations: Not on file     Relationship status: Not on file    Intimate partner violence     Fear of current or ex partner: Not on file     Emotionally abused: Not on file     Physically abused: Not on file     Forced sexual activity: Not on file   Other Topics Concern    Not on file   Social History Narrative    Not on file       History reviewed. No pertinent family history. Review Of Systems:   Review of Systems   Constitutional: Negative for chills, diaphoresis, fatigue and fever. HENT: Negative for sore throat and trouble swallowing. Eyes: Negative. Respiratory: Negative for cough, chest tightness, shortness of breath and wheezing. Cardiovascular: Negative for chest pain and palpitations. Gastrointestinal: Negative for abdominal pain, constipation, diarrhea, nausea and vomiting. Endocrine: Negative. Genitourinary: Negative for dysuria, flank pain and urgency. Musculoskeletal: Positive for arthralgias, gait problem and joint swelling. Skin: Negative. Neurological: Negative for dizziness, syncope, speech difficulty, weakness, numbness and headaches. Psychiatric/Behavioral: Negative.           Physical Exam:  Vitals:    05/11/21 1835 05/11/21 2000 05/11/21 2120   BP: 130/69 127/71 129/68   Pulse: 105 87 84   Resp: 16 16 16   Temp: 97.5 °F (36.4 °C)     TempSrc: Oral     SpO2: 96% 97% 98%   Weight: 100 lb (45.4 kg)     Height: 5' 6\" (1.676 m)         CONSTITUTIONAL:  awake, alert, cooperative, no apparent distress, appears stated age and mild cognitive impairment  EYES:  pupils equal, round and reactive to light and conjunctiva normal  ENT:  normocepalic, without obvious abnormality  NECK:  supple, symmetrical, trachea midline, no lymphadenopathy, thyroid not enlarged, symmetric, no tenderness, no jugular venous distension and no carotid bruits  LUNGS:  No increased work of breathing, good air exchange, clear to auscultation bilaterally, no crackles or wheezing  CARDIOVASCULAR:  regular rate and rhythm, no murmur noted, no edema, pulses 2 plus all extermities bilaterally and carotids without bruits bilaterally  ABDOMEN:  normal bowel sounds, soft and non-tender  MUSCULOSKELETAL:  Right wrist splinted,  Right knee in immobilizer. NEUROLOGIC:  Awake, alert, oriented to name, place and time. Sensory is intact. SKIN:  no rashes and no jaundice    Labs:  Recent Results (from the past 24 hour(s))   COVID-19, Rapid    Collection Time: 05/11/21  8:33 PM    Specimen: Nasopharyngeal Swab   Result Value Ref Range    SARS-CoV-2, NAAT Not Detected Not Detected     Assessment/Plan:  Principal Problem:    Closed nondisp transvrs fracture of right patella with routine healing    David Friar over a flower pot on patio. No LOC, dizziness, HA.  CT head negative. Xray knee w fracture right patella. Plan: Ortho to address in AM     Cardiology consulted for surgical clearance. Tramadol for pain. Right wrist fracture. After fall. Mild discomfort  Colles fx    Splinted    Plan: Ortho to address in AM.  Tramadol for pain. Active Problems:    Diabetes mellitus   Daughter states blood sugars at times over 300    Last A1c ws 8.3  In April 2021. She takes [place] [facility] [place] and lantus 10u daily. Plan:  lantus  And   SSI  Coverage w meals. POCT    CAD  No chest pain  Takes BB and statin. EKG in April showed A sensed, V paced rhythm    Plan: cardiology to see    Cardiomyopathy   No evidence of heart failure   Last echo 2018 w EF 40%    On BB   No complaints of CP, SOB, PND. Plan: continue meds   cardiology to see.      Hypertension  BP is

## 2021-05-12 NOTE — CONSULTS
Consult Note  Patient: Kirt Russell  Unit/Bed: R323/E333-06  YOB: 1928  MRN: [id number]  Acct: [de-identified]   Admitting Diagnosis: Closed nondisp transvrs fracture of right patella with routine healing [S82.034D]  Date:  5/11/2021  Hospital Day: 0      Chief Complaint:  Right wrist and knee pain after mechanical fall    History of Present Illness: This is a very pleasant 80-year-old  female with past medical history significant for coronary artery disease status post remote PCI of the LAD and RCA, history of ischemic cardiomyopathy with last EF of 40% per echo in 2016, hypertension, dyslipidemia, diabetes, history of sick sinus syndrome/complete heart block status post pacemaker implant in 2016 multiple medical problems who presented to the emergency room yesterday evening with complaints of fall at home. Patient reports gardening yesterday at which time she tripped and fell landing on her right hand and knee. She denied any chest pain, shortness of breath, palpitations, dizziness, lightheadedness or syncope. Fall was mechanical in nature. Due to subsequent right wrist and knee pain she was brought to the ER for further evaluation. On presentation to the emergency room, blood pressure 130/69, heart rate 105, respiratory rate 16, pulse ox 96%, temperature 97.5 °F.  Sodium 134, potassium 3.6, chloride 95, total CO2 29, BUN elevated 25, creatinine elevated 1.23, GFR low at 40.7, glucose 94. WBC 7.4, hemoglobin 10.8, hematocrit 32.2, platelets 675. Rapid Covid testing negative. Right wrist x-ray showed a complex distal radius fracture with intra-articular extension, ulnar styloid fracture. Right knee x-ray showed acute transverse nondisplaced fracture involving the patella and associated lipohemarthrosis. CT head showed no acute intracranial process. She was admitted for further evaluation. At time evaluation today, patient is seen on 2 N. resting comfortably and in no distress. She is extremely hard of hearing. Denies chest pain, shortness of breath, palpitations, dizziness, lightheadedness or syncope. Orthopedic surgery is planning surgical intervention of right distal radius fracture tomorrow once clearance obtained. Patient is known to have a history of CAD status post remote PCI with last documented intervention in 2010/2011. Known to have an ischemic cardiomyopathy with EF of 40% per echo in 2016. Repeat echocardiogram pending today. Currently on telemetry, she is sinus rhythm with V pacing with heart rate in the 80s. Last pacemaker interrogation from 4/27/2021 was reviewed and unremarkable.       Allergies   Allergen Reactions    Eggs Or Egg-Derived Products     Tylenol [Acetaminophen]        Current Facility-Administered Medications   Medication Dose Route Frequency Provider Last Rate Last Admin    carvedilol (COREG) tablet 3.125 mg  3.125 mg Oral BID  TRA Holliday CNP        [START ON 5/13/2021] ezetimibe (ZETIA) tablet 10 mg  10 mg Oral Nightly TRA Holliday CNP        atorvastatin (LIPITOR) tablet 10 mg  10 mg Oral Daily TRA Holliday CNP        magnesium oxide (MAG-OX) tablet 400 mg  400 mg Oral Daily TRA Holliday CNP        PARoxetine (PAXIL) tablet 20 mg  20 mg Oral Daily TRA Cerrato CNP        sodium chloride flush 0.9 % injection 5-40 mL  5-40 mL Intravenous 2 times per day Gris Rausch MD        sodium chloride flush 0.9 % injection 5-40 mL  5-40 mL Intravenous PRN Gris Rausch MD        0.9 % sodium chloride infusion  25 mL Intravenous PRN Gris Rausch MD        enoxaparin (LOVENOX) injection 40 mg  40 mg Subcutaneous Daily Gris Rausch MD        traMADol Andrea Pepper) tablet 50 mg  50 mg Oral Q4H PRN Gris Rausch MD        morphine injection 4 mg  4 mg Intravenous Q3H PRN Gris Rausch MD        ondansetron (ZOFRAN-ODT) disintegrating per week: None     Minutes per session: None    Stress: None   Relationships    Social connections     Talks on phone: None     Gets together: None     Attends [employer] service: None     Active member of club or organization: None     Attends meetings of clubs or organizations: None     Relationship status: None    Intimate partner violence     Fear of current or ex partner: None     Emotionally abused: None     Physically abused: None     Forced sexual activity: None   Other Topics Concern    None   Social History Narrative    None       Family Hx:  History reviewed. No pertinent family history. Review of Systems:   Review of Systems   Constitutional: Negative for activity change, fatigue and fever. HENT: Negative for congestion. Respiratory: Negative for chest tightness and shortness of breath. Cardiovascular: Negative for chest pain, palpitations and leg swelling. Gastrointestinal: Negative for nausea and vomiting. Genitourinary: Negative for difficulty urinating. Skin: Negative for color change, pallor and rash. Neurological: Negative for dizziness, syncope and light-headedness. Psychiatric/Behavioral: Negative for agitation. Physical Examination:    BP (!) 119/55   Pulse 74   Temp 98.6 °F (37 °C) (Oral)   Resp 16   Ht 5' 6\" (1.676 m)   Wt 100 lb (45.4 kg)   SpO2 96%   BMI 16.14 kg/m²    Physical Exam  Constitutional:       Comments: Frail appearing; extremely hard of hearing   HENT:      Head: Normocephalic and atraumatic. Neck:      Musculoskeletal: Normal range of motion and neck supple. Cardiovascular:      Rate and Rhythm: Normal rate and regular rhythm. Pulmonary:      Effort: Pulmonary effort is normal. No respiratory distress. Breath sounds: No wheezing, rhonchi or rales. Abdominal:      Palpations: Abdomen is soft. Tenderness: There is no abdominal tenderness. Musculoskeletal: Normal range of motion. Right lower leg: No edema.       Left lower leg: No edema. Comments: Right forearm in soft cast   Skin:     General: Skin is warm and dry. Neurological:      General: No focal deficit present. Mental Status: She is alert and oriented to person, place, and time. Cranial Nerves: No cranial nerve deficit.    Psychiatric:         Mood and Affect: Mood normal.         Behavior: Behavior normal.         LABS:  CBC:  Lab Results   Component Value Date    WBC 7.4 05/12/2021    RBC 3.59 05/12/2021    HGB 10.8 05/12/2021    HCT 32.2 05/12/2021    MCV 89.7 05/12/2021    MCH 30.0 05/12/2021    MCHC 33.5 05/12/2021    RDW 13.3 05/12/2021     05/12/2021    MPV 7.5 01/31/2014     CBC with Differential:   Lab Results   Component Value Date    WBC 7.4 05/12/2021    RBC 3.59 05/12/2021    HGB 10.8 05/12/2021    HCT 32.2 05/12/2021     05/12/2021    MCV 89.7 05/12/2021    MCH 30.0 05/12/2021    MCHC 33.5 05/12/2021    RDW 13.3 05/12/2021    BANDSPCT 1 04/20/2021    LYMPHOPCT 22.3 05/12/2021    MONOPCT 10.0 05/12/2021    BASOPCT 0.5 05/12/2021    MONOSABS 0.7 05/12/2021    LYMPHSABS 1.6 05/12/2021    EOSABS 0.1 05/12/2021    BASOSABS 0.0 05/12/2021     CMP:    Lab Results   Component Value Date     05/12/2021    K 3.6 05/12/2021    CL 95 05/12/2021    CO2 29 05/12/2021    BUN 25 05/12/2021    CREATININE 1.23 05/12/2021    GFRAA 49.3 05/12/2021    LABGLOM 40.7 05/12/2021    GLUCOSE 94 05/12/2021    PROT 5.9 05/12/2021    LABALBU 3.8 05/12/2021    CALCIUM 10.1 05/12/2021    BILITOT 0.3 05/12/2021    ALKPHOS 52 05/12/2021    AST 15 05/12/2021    ALT 7 05/12/2021     BMP:    Lab Results   Component Value Date     05/12/2021    K 3.6 05/12/2021    CL 95 05/12/2021    CO2 29 05/12/2021    BUN 25 05/12/2021    LABALBU 3.8 05/12/2021    CREATININE 1.23 05/12/2021    CALCIUM 10.1 05/12/2021    GFRAA 49.3 05/12/2021    LABGLOM 40.7 05/12/2021    GLUCOSE 94 05/12/2021     Magnesium:    Lab Results   Component Value Date    MG 1.3 04/20/2021 Troponin:    Lab Results   Component Value Date    TROPONINI 0.014 04/20/2021       Radiology:  Xr Wrist Right (min 3 Views)    Result Date: 5/12/2021  EXAMINATION:  XR WRIST RIGHT (MIN 3 VIEWS), XR HAND RIGHT (MIN 3 VIEWS) HISTORY:  Fall with wrist deformity. TECHNIQUE:  XR WRIST RIGHT (MIN 3 VIEWS), XR HAND RIGHT (MIN 3 VIEWS) COMPARISON: None RESULT: Right wrist/hand: Impacted comminuted mildly displaced mildly angled fracture involving the distal radius with apparent intra-articular extension. There also appears to be mildly displaced fracture involving the ulnar styloid. Associated soft tissue swelling. Remote healed fracture involving the fifth metacarpal. Osteopenia. Scattered degenerative changes. Vascular calcifications. No other significant abnormality. Complex distal radius fracture with intra-articular extension. Ulnar styloid fracture. Xr Hand Right (min 3 Views)    Result Date: 5/12/2021  EXAMINATION:  XR WRIST RIGHT (MIN 3 VIEWS), XR HAND RIGHT (MIN 3 VIEWS) HISTORY:  Fall with wrist deformity. TECHNIQUE:  XR WRIST RIGHT (MIN 3 VIEWS), XR HAND RIGHT (MIN 3 VIEWS) COMPARISON: None RESULT: Right wrist/hand: Impacted comminuted mildly displaced mildly angled fracture involving the distal radius with apparent intra-articular extension. There also appears to be mildly displaced fracture involving the ulnar styloid. Associated soft tissue swelling. Remote healed fracture involving the fifth metacarpal. Osteopenia. Scattered degenerative changes. Vascular calcifications. No other significant abnormality. Complex distal radius fracture with intra-articular extension. Ulnar styloid fracture. Xr Knee Right (3 Views)    Result Date: 5/12/2021  EXAMINATION:  XR KNEE RIGHT (3 VIEWS) HISTORY:  Fall, landed on the right knee and hand. TECHNIQUE:  XR KNEE RIGHT (3 VIEWS) COMPARISON: None RESULT: Acute transverse nondisplaced fracture involving the mid to lower pole of the patella.  Associated moderate lipohemarthrosis. No other distinct acute fractures about the knee. Underlying osteopenia or osteoporosis. Small osteophytes with grossly maintained joint spaces. Diffuse vascular calcifications. Superior patellar enthesophyte. No other significant abnormality. Acute transverse nondisplaced fracture involving the patella with associated lipohemarthrosis. Ct Head Wo Contrast    Result Date: 5/11/2021  EXAMINATION: CT of the brain without contrast HISTORY: Pain after a fall COMPARISON: None available TECHNIQUE: Multiple contiguous axial images were obtained of the brain from the skull base through the vertex. Multiplanar reformats were obtained. FINDINGS: Prominence of the sulci and ventricles compatible with moderate generalized parenchymal volume loss. Gray-white matter differentiation is preserved. Areas of bilateral supratentorial white matter hypoattenuation are nonspecific but most likely related to  chronic small vessel ischemic changes in a patient of this age. No acute hemorrhage or abnormal extra-axial fluid collection. No mass effect or midline shift. The visualized paranasal sinuses and mastoid air cells are clear. Calvarium is intact. No acute intracranial process. Generalized parenchymal volume loss and nonspecific white matter findings most compatible with chronic small vessel ischemic changes in a patient of this age. All CT scans at this facility use dose modulation, iterative reconstruction, and/or weight based dosing when appropriate to reduce radiation dose to as low as reasonably achievable. Ct Facial Bones Wo Contrast    Result Date: 5/11/2021  EXAM: CT FACIAL BONES WO CONTRAST HISTORY: Pain after a fall TECHNIQUE: Multiple contiguous axial images were obtained of the facial bones without contrast. Multiplanar reformats were obtained. COMPARISON: None available FINDINGS: No acute facial bone fracture. Orbital rims are intact. Orbital contents are within normal limits.   The paranasal sinuses are clear. Visualized mastoid air cells are clear. Facial soft tissues are within normal limits. Degenerative changes of the visualized cervical spine. No acute facial bone fracture. All CT scans at this facility use dose modulation, iterative reconstruction, and/or weight based dosing when appropriate to reduce radiation dose to as low as reasonably achievable. Ct Knee Right Wo Contrast    Result Date: 5/12/2021  CT KNEE RIGHT WO CONTRAST HISTORY:  s/p mechanical fall with patellar fracture. Evaluation of tibial plateau TECHNIQUE: Routine CT of the right knee without contrast Contrast: None. All CT scans at this facility use dose modulation, iterative reconstruction, and/or weight based dosing when appropriate to reduce radiation dose to as low as reasonably achievable. COMPARISON: Right knee radiographs 5/11/2021. RESULT: Acute transverse nondisplaced fracture involving the mid to lower pole of the patella. Associated moderate lipohemarthrosis. No other distinct acute fractures about the knee. Underlying osteopenia or osteoporosis. Small osteophytes with grossly maintained joint spaces. Diffuse vascular calcifications. Superior patellar enthesophyte. Soft tissue swelling anteriorly. Muscle bulk grossly maintained. Acute nondisplaced fracture involving the patella with associated moderate lipohemarthrosis. . No other distinct fractures. EKG 5/12/21: V-paced 71, T wave inversion leads I and aVL, QTc 510ms    Telemetry 5/12/21: SR 40s-50s with frequent V-pacing      Assessment:    Active Hospital Problems    Diagnosis Date Noted    Closed nondisp transvrs fracture of right patella with routine healing [S82.034D] 05/11/2021    Diabetes mellitus ([facility] [E11.9]     Pacemaker [Z95.0]     Cardiomyopathy ([address] [I42.9]     CAD (coronary artery disease) [I25.10] 07/17/2018    Pure hypercholesterolemia [E78.00] 07/17/2018    Hypertension [I10] 07/17/2018     1.  Complex distal radius

## 2021-05-12 NOTE — CARE COORDINATION
NOTE REVIEWED FROM NIGHT SHIFT THAT DAUGHTER WOULD LIKE TO DISCUSS [facility] VS [facility] FOR REHAB. WILL UPDATE LSW AND AWAIT THERAPY EVALS POST SURGERY.

## 2021-05-12 NOTE — PROGRESS NOTES
Home meds reconciled and updated with daughter Nabil Drew, [facility] [employer] aware of completed list

## 2021-05-12 NOTE — ACP (ADVANCE CARE PLANNING)
Advance Care Planning     Advance Care Planning Activator (Inpatient)  Conversation Note      Date of ACP Conversation: 5/11/2021    Conversation Conducted with: Patient with [address]: Named in Advance Directive or Healthcare Power of  (name) daughter Alejandra Bridges    ACP Activator: [address] Decision Maker:     Current Designated Health Care Decision Maker:     Primary Decision Maker: Phyllis Llamas [facility] - [phone]     Pt's daughter Deejay Desai states that pt does have a living will and it is current with her wishes in that at this time she would receive cpr/vent but if her condition were to worsen she \"would not\" have cpr/vent.

## 2021-05-12 NOTE — ED NOTES
Sugar tong splint applied to right wrist as directed. Knee immobilizer applied to right knee. Patient tolerated well.      Nayeli Leary RN  05/11/21 0058

## 2021-05-13 ENCOUNTER — APPOINTMENT (OUTPATIENT)
Dept: GENERAL RADIOLOGY | Age: 86
DRG: 511 | End: 2021-05-13
Payer: MEDICARE

## 2021-05-13 ENCOUNTER — ANESTHESIA EVENT (OUTPATIENT)
Dept: OPERATING ROOM | Age: 86
DRG: 511 | End: 2021-05-13
Payer: MEDICARE

## 2021-05-13 ENCOUNTER — ANESTHESIA (OUTPATIENT)
Dept: OPERATING ROOM | Age: 86
DRG: 511 | End: 2021-05-13
Payer: MEDICARE

## 2021-05-13 VITALS — OXYGEN SATURATION: 100 % | DIASTOLIC BLOOD PRESSURE: 79 MMHG | SYSTOLIC BLOOD PRESSURE: 169 MMHG

## 2021-05-13 PROBLEM — S82.034D CLOSED NONDISPLACED TRANSVERSE FRACTURE OF RIGHT PATELLA WITH ROUTINE HEALING: Status: ACTIVE | Noted: 2021-05-13

## 2021-05-13 LAB
ABO/RH: NORMAL
ANION GAP SERPL CALCULATED.3IONS-SCNC: 12 MEQ/L (ref 9–15)
ANTIBODY SCREEN: NORMAL
BUN BLDV-MCNC: 19 MG/DL (ref 8–23)
CALCIUM SERPL-MCNC: 10 MG/DL (ref 8.5–9.9)
CHLORIDE BLD-SCNC: 95 MEQ/L (ref 95–107)
CO2: 29 MEQ/L (ref 20–31)
CREAT SERPL-MCNC: 1.05 MG/DL (ref 0.5–0.9)
EKG ATRIAL RATE: 71 BPM
EKG P AXIS: 69 DEGREES
EKG P-R INTERVAL: 178 MS
EKG Q-T INTERVAL: 470 MS
EKG QRS DURATION: 158 MS
EKG QTC CALCULATION (BAZETT): 510 MS
EKG R AXIS: -72 DEGREES
EKG T AXIS: 110 DEGREES
EKG VENTRICULAR RATE: 71 BPM
GFR AFRICAN AMERICAN: 59.2
GFR NON-AFRICAN AMERICAN: 48.9
GLUCOSE BLD-MCNC: 256 MG/DL (ref 60–115)
GLUCOSE BLD-MCNC: 265 MG/DL (ref 70–99)
GLUCOSE BLD-MCNC: 271 MG/DL (ref 60–115)
GLUCOSE BLD-MCNC: 427 MG/DL (ref 60–115)
GLUCOSE BLD-MCNC: 428 MG/DL (ref 60–115)
PERFORMED ON: ABNORMAL
POTASSIUM SERPL-SCNC: 4 MEQ/L (ref 3.4–4.9)
SODIUM BLD-SCNC: 136 MEQ/L (ref 135–144)

## 2021-05-13 PROCEDURE — 93010 ELECTROCARDIOGRAM REPORT: CPT | Performed by: INTERNAL MEDICINE

## 2021-05-13 PROCEDURE — 7100000000 HC PACU RECOVERY - FIRST 15 MIN: Performed by: ORTHOPAEDIC SURGERY

## 2021-05-13 PROCEDURE — 6360000002 HC RX W HCPCS: Performed by: NURSE PRACTITIONER

## 2021-05-13 PROCEDURE — 86850 RBC ANTIBODY SCREEN: CPT

## 2021-05-13 PROCEDURE — C1713 ANCHOR/SCREW BN/BN,TIS/BN: HCPCS | Performed by: ORTHOPAEDIC SURGERY

## 2021-05-13 PROCEDURE — 2580000003 HC RX 258: Performed by: ORTHOPAEDIC SURGERY

## 2021-05-13 PROCEDURE — 6370000000 HC RX 637 (ALT 250 FOR IP): Performed by: NURSE PRACTITIONER

## 2021-05-13 PROCEDURE — 6370000000 HC RX 637 (ALT 250 FOR IP): Performed by: ORTHOPAEDIC SURGERY

## 2021-05-13 PROCEDURE — 7100000001 HC PACU RECOVERY - ADDTL 15 MIN: Performed by: ORTHOPAEDIC SURGERY

## 2021-05-13 PROCEDURE — 3700000001 HC ADD 15 MINUTES (ANESTHESIA): Performed by: ORTHOPAEDIC SURGERY

## 2021-05-13 PROCEDURE — 36415 COLL VENOUS BLD VENIPUNCTURE: CPT

## 2021-05-13 PROCEDURE — 2580000003 HC RX 258: Performed by: NURSE PRACTITIONER

## 2021-05-13 PROCEDURE — 3600000014 HC SURGERY LEVEL 4 ADDTL 15MIN: Performed by: ORTHOPAEDIC SURGERY

## 2021-05-13 PROCEDURE — 3209999900 FLUORO FOR SURGICAL PROCEDURES

## 2021-05-13 PROCEDURE — 6360000002 HC RX W HCPCS: Performed by: ANESTHESIOLOGY

## 2021-05-13 PROCEDURE — 86900 BLOOD TYPING SEROLOGIC ABO: CPT

## 2021-05-13 PROCEDURE — 0PSH34Z REPOSITION RIGHT RADIUS WITH INTERNAL FIXATION DEVICE, PERCUTANEOUS APPROACH: ICD-10-PCS | Performed by: ORTHOPAEDIC SURGERY

## 2021-05-13 PROCEDURE — 3600000004 HC SURGERY LEVEL 4 BASE: Performed by: ORTHOPAEDIC SURGERY

## 2021-05-13 PROCEDURE — 80048 BASIC METABOLIC PNL TOTAL CA: CPT

## 2021-05-13 PROCEDURE — 3700000000 HC ANESTHESIA ATTENDED CARE: Performed by: ORTHOPAEDIC SURGERY

## 2021-05-13 PROCEDURE — 3E0T3BZ INTRODUCTION OF ANESTHETIC AGENT INTO PERIPHERAL NERVES AND PLEXI, PERCUTANEOUS APPROACH: ICD-10-PCS | Performed by: ANESTHESIOLOGY

## 2021-05-13 PROCEDURE — 86901 BLOOD TYPING SEROLOGIC RH(D): CPT

## 2021-05-13 PROCEDURE — 2580000003 HC RX 258: Performed by: INTERNAL MEDICINE

## 2021-05-13 PROCEDURE — 2709999900 HC NON-CHARGEABLE SUPPLY: Performed by: ORTHOPAEDIC SURGERY

## 2021-05-13 PROCEDURE — 1210000000 HC MED SURG R&B

## 2021-05-13 PROCEDURE — 64417 NJX AA&/STRD AX NERVE IMG: CPT | Performed by: ANESTHESIOLOGY

## 2021-05-13 DEVICE — IMPLANTABLE DEVICE: Type: IMPLANTABLE DEVICE | Site: WRIST | Status: FUNCTIONAL

## 2021-05-13 RX ORDER — SODIUM CHLORIDE 0.9 % (FLUSH) 0.9 %
10 SYRINGE (ML) INJECTION EVERY 12 HOURS SCHEDULED
Status: DISCONTINUED | OUTPATIENT
Start: 2021-05-13 | End: 2021-05-15 | Stop reason: HOSPADM

## 2021-05-13 RX ORDER — HYDROCODONE BITARTRATE AND ACETAMINOPHEN 5; 325 MG/1; MG/1
2 TABLET ORAL PRN
Status: DISCONTINUED | OUTPATIENT
Start: 2021-05-13 | End: 2021-05-13 | Stop reason: HOSPADM

## 2021-05-13 RX ORDER — DEXAMETHASONE SODIUM PHOSPHATE 10 MG/ML
INJECTION INTRAMUSCULAR; INTRAVENOUS PRN
Status: DISCONTINUED | OUTPATIENT
Start: 2021-05-13 | End: 2021-05-13 | Stop reason: SDUPTHER

## 2021-05-13 RX ORDER — PROPOFOL 10 MG/ML
INJECTION, EMULSION INTRAVENOUS CONTINUOUS PRN
Status: DISCONTINUED | OUTPATIENT
Start: 2021-05-13 | End: 2021-05-13 | Stop reason: SDUPTHER

## 2021-05-13 RX ORDER — MORPHINE SULFATE 2 MG/ML
2 INJECTION, SOLUTION INTRAMUSCULAR; INTRAVENOUS
Status: DISCONTINUED | OUTPATIENT
Start: 2021-05-13 | End: 2021-05-15 | Stop reason: HOSPADM

## 2021-05-13 RX ORDER — METOCLOPRAMIDE HYDROCHLORIDE 5 MG/ML
10 INJECTION INTRAMUSCULAR; INTRAVENOUS
Status: DISCONTINUED | OUTPATIENT
Start: 2021-05-13 | End: 2021-05-13 | Stop reason: HOSPADM

## 2021-05-13 RX ORDER — FENTANYL CITRATE 50 UG/ML
50 INJECTION, SOLUTION INTRAMUSCULAR; INTRAVENOUS EVERY 10 MIN PRN
Status: DISCONTINUED | OUTPATIENT
Start: 2021-05-13 | End: 2021-05-13 | Stop reason: HOSPADM

## 2021-05-13 RX ORDER — ONDANSETRON 2 MG/ML
4 INJECTION INTRAMUSCULAR; INTRAVENOUS EVERY 6 HOURS PRN
Status: DISCONTINUED | OUTPATIENT
Start: 2021-05-13 | End: 2021-05-15 | Stop reason: HOSPADM

## 2021-05-13 RX ORDER — CEFAZOLIN SODIUM 2 G/50ML
2000 SOLUTION INTRAVENOUS EVERY 8 HOURS
Status: COMPLETED | OUTPATIENT
Start: 2021-05-13 | End: 2021-05-14

## 2021-05-13 RX ORDER — CEFAZOLIN SODIUM 2 G/50ML
2000 SOLUTION INTRAVENOUS ONCE
Status: COMPLETED | OUTPATIENT
Start: 2021-05-13 | End: 2021-05-13

## 2021-05-13 RX ORDER — SENNA AND DOCUSATE SODIUM 50; 8.6 MG/1; MG/1
1 TABLET, FILM COATED ORAL 2 TIMES DAILY
Status: DISCONTINUED | OUTPATIENT
Start: 2021-05-13 | End: 2021-05-15 | Stop reason: HOSPADM

## 2021-05-13 RX ORDER — DIPHENHYDRAMINE HYDROCHLORIDE 50 MG/ML
12.5 INJECTION INTRAMUSCULAR; INTRAVENOUS
Status: DISCONTINUED | OUTPATIENT
Start: 2021-05-13 | End: 2021-05-13 | Stop reason: HOSPADM

## 2021-05-13 RX ORDER — MAGNESIUM HYDROXIDE 1200 MG/15ML
LIQUID ORAL CONTINUOUS PRN
Status: COMPLETED | OUTPATIENT
Start: 2021-05-13 | End: 2021-05-13

## 2021-05-13 RX ORDER — HYDROCODONE BITARTRATE AND ACETAMINOPHEN 5; 325 MG/1; MG/1
1 TABLET ORAL PRN
Status: DISCONTINUED | OUTPATIENT
Start: 2021-05-13 | End: 2021-05-13 | Stop reason: HOSPADM

## 2021-05-13 RX ORDER — ROPIVACAINE HYDROCHLORIDE 5 MG/ML
INJECTION, SOLUTION EPIDURAL; INFILTRATION; PERINEURAL PRN
Status: DISCONTINUED | OUTPATIENT
Start: 2021-05-13 | End: 2021-05-13 | Stop reason: SDUPTHER

## 2021-05-13 RX ORDER — SODIUM CHLORIDE 0.9 % (FLUSH) 0.9 %
10 SYRINGE (ML) INJECTION PRN
Status: DISCONTINUED | OUTPATIENT
Start: 2021-05-13 | End: 2021-05-15 | Stop reason: HOSPADM

## 2021-05-13 RX ORDER — SODIUM CHLORIDE 9 MG/ML
25 INJECTION, SOLUTION INTRAVENOUS PRN
Status: DISCONTINUED | OUTPATIENT
Start: 2021-05-13 | End: 2021-05-15 | Stop reason: HOSPADM

## 2021-05-13 RX ORDER — OXYCODONE HYDROCHLORIDE 5 MG/1
5 TABLET ORAL EVERY 4 HOURS PRN
Status: DISCONTINUED | OUTPATIENT
Start: 2021-05-13 | End: 2021-05-15 | Stop reason: HOSPADM

## 2021-05-13 RX ORDER — ASPIRIN 81 MG/1
81 TABLET ORAL DAILY
Status: DISCONTINUED | OUTPATIENT
Start: 2021-05-13 | End: 2021-05-15 | Stop reason: HOSPADM

## 2021-05-13 RX ORDER — SODIUM CHLORIDE, SODIUM LACTATE, POTASSIUM CHLORIDE, CALCIUM CHLORIDE 600; 310; 30; 20 MG/100ML; MG/100ML; MG/100ML; MG/100ML
INJECTION, SOLUTION INTRAVENOUS CONTINUOUS
Status: DISCONTINUED | OUTPATIENT
Start: 2021-05-13 | End: 2021-05-14

## 2021-05-13 RX ORDER — KETOROLAC TROMETHAMINE 15 MG/ML
15 INJECTION, SOLUTION INTRAMUSCULAR; INTRAVENOUS EVERY 6 HOURS
Status: COMPLETED | OUTPATIENT
Start: 2021-05-13 | End: 2021-05-14

## 2021-05-13 RX ORDER — ONDANSETRON 2 MG/ML
4 INJECTION INTRAMUSCULAR; INTRAVENOUS
Status: DISCONTINUED | OUTPATIENT
Start: 2021-05-13 | End: 2021-05-13 | Stop reason: HOSPADM

## 2021-05-13 RX ORDER — ONDANSETRON 4 MG/1
4 TABLET, ORALLY DISINTEGRATING ORAL EVERY 8 HOURS PRN
Status: DISCONTINUED | OUTPATIENT
Start: 2021-05-13 | End: 2021-05-15 | Stop reason: HOSPADM

## 2021-05-13 RX ORDER — SODIUM CHLORIDE 9 MG/ML
INJECTION, SOLUTION INTRAVENOUS CONTINUOUS
Status: DISCONTINUED | OUTPATIENT
Start: 2021-05-13 | End: 2021-05-14

## 2021-05-13 RX ORDER — EPINEPHRINE 1 MG/ML
INJECTION, SOLUTION, CONCENTRATE INTRAVENOUS PRN
Status: DISCONTINUED | OUTPATIENT
Start: 2021-05-13 | End: 2021-05-13 | Stop reason: SDUPTHER

## 2021-05-13 RX ORDER — MEPERIDINE HYDROCHLORIDE 25 MG/ML
12.5 INJECTION INTRAMUSCULAR; INTRAVENOUS; SUBCUTANEOUS EVERY 5 MIN PRN
Status: DISCONTINUED | OUTPATIENT
Start: 2021-05-13 | End: 2021-05-13 | Stop reason: HOSPADM

## 2021-05-13 RX ADMIN — SODIUM CHLORIDE, PRESERVATIVE FREE 10 ML: 5 INJECTION INTRAVENOUS at 22:15

## 2021-05-13 RX ADMIN — SODIUM CHLORIDE, PRESERVATIVE FREE 10 ML: 5 INJECTION INTRAVENOUS at 10:45

## 2021-05-13 RX ADMIN — DEXAMETHASONE SODIUM PHOSPHATE 10 MG: 10 INJECTION INTRAMUSCULAR; INTRAVENOUS at 11:55

## 2021-05-13 RX ADMIN — KETOROLAC TROMETHAMINE 15 MG: 15 INJECTION, SOLUTION INTRAMUSCULAR; INTRAVENOUS at 22:15

## 2021-05-13 RX ADMIN — KETOROLAC TROMETHAMINE 15 MG: 15 INJECTION, SOLUTION INTRAMUSCULAR; INTRAVENOUS at 13:55

## 2021-05-13 RX ADMIN — PROPOFOL 50 MCG/KG/MIN: 10 INJECTION, EMULSION INTRAVENOUS at 12:50

## 2021-05-13 RX ADMIN — CEFAZOLIN SODIUM 2000 MG: 2 SOLUTION INTRAVENOUS at 12:52

## 2021-05-13 RX ADMIN — EPINEPHRINE 150 MCG: 1 INJECTION, SOLUTION INTRAMUSCULAR; SUBCUTANEOUS at 11:55

## 2021-05-13 RX ADMIN — CARVEDILOL 3.12 MG: 3.12 TABLET, FILM COATED ORAL at 19:11

## 2021-05-13 RX ADMIN — DOCUSATE SODIUM 50 MG AND SENNOSIDES 8.6 MG 1 TABLET: 8.6; 5 TABLET, FILM COATED ORAL at 22:11

## 2021-05-13 RX ADMIN — EZETIMIBE 10 MG: 10 TABLET ORAL at 22:11

## 2021-05-13 RX ADMIN — SODIUM CHLORIDE: 9 INJECTION, SOLUTION INTRAVENOUS at 19:04

## 2021-05-13 RX ADMIN — SODIUM CHLORIDE 75 ML/HR: 9 INJECTION, SOLUTION INTRAVENOUS at 10:45

## 2021-05-13 RX ADMIN — CARVEDILOL 3.12 MG: 3.12 TABLET, FILM COATED ORAL at 10:44

## 2021-05-13 RX ADMIN — ATORVASTATIN CALCIUM 10 MG: 10 TABLET, FILM COATED ORAL at 22:14

## 2021-05-13 RX ADMIN — CEFAZOLIN SODIUM 2000 MG: 2 SOLUTION INTRAVENOUS at 21:55

## 2021-05-13 RX ADMIN — INSULIN LISPRO 12 UNITS: 100 INJECTION, SOLUTION INTRAVENOUS; SUBCUTANEOUS at 18:50

## 2021-05-13 RX ADMIN — SODIUM CHLORIDE, PRESERVATIVE FREE 10 ML: 5 INJECTION INTRAVENOUS at 22:16

## 2021-05-13 RX ADMIN — ROPIVACAINE HYDROCHLORIDE 30 ML: 5 INJECTION, SOLUTION EPIDURAL; INFILTRATION; PERINEURAL at 11:55

## 2021-05-13 RX ADMIN — TRAMADOL HYDROCHLORIDE 50 MG: 50 TABLET, FILM COATED ORAL at 19:04

## 2021-05-13 ASSESSMENT — PULMONARY FUNCTION TESTS
PIF_VALUE: 0
PIF_VALUE: 0
PIF_VALUE: 1
PIF_VALUE: 0
PIF_VALUE: 1
PIF_VALUE: 0

## 2021-05-13 ASSESSMENT — ENCOUNTER SYMPTOMS
SHORTNESS OF BREATH: 0
NAUSEA: 0
DIARRHEA: 0
VOMITING: 0
COUGH: 0

## 2021-05-13 ASSESSMENT — PAIN SCALES - GENERAL
PAINLEVEL_OUTOF10: 2
PAINLEVEL_OUTOF10: 7
PAINLEVEL_OUTOF10: 3
PAINLEVEL_OUTOF10: 5

## 2021-05-13 ASSESSMENT — PAIN DESCRIPTION - ORIENTATION: ORIENTATION: RIGHT

## 2021-05-13 ASSESSMENT — PAIN DESCRIPTION - DESCRIPTORS: DESCRIPTORS: CONSTANT

## 2021-05-13 NOTE — ANESTHESIA PROCEDURE NOTES
Peripheral Block    Patient location during procedure: pre-op  Start time: 5/13/2021 11:50 AM  End time: 5/13/2021 11:55 AM  Staffing  Performed: anesthesiologist   Anesthesiologist: Rehan Campbell MD  Preanesthetic Checklist  Completed: patient identified, IV checked, site marked, risks and benefits discussed, surgical consent, monitors and equipment checked, pre-op evaluation, timeout performed, anesthesia consent given, oxygen available and patient being monitored  Peripheral Block  Patient position: supine  Prep: ChloraPrep  Patient monitoring: cardiac monitor, continuous pulse ox, frequent blood pressure checks and IV access  Block type: Axillary  Laterality: right  Injection technique: single-shot  Guidance: ultrasound guided and Lateral, IP approach, probe cover employed  Local infiltration: lidocaine  Infiltration strength: 1 %  Dose: 3 mL  Provider prep: mask and sterile gloves  Local infiltration: lidocaine  Needle  Needle type: combined needle/nerve stimulator   Needle gauge: 21 G  Needle length: 10 cm  Needle localization: ultrasound guidance  Assessment  Injection assessment: negative aspiration for heme, no paresthesia on injection and local visualized surrounding nerve on ultrasound  Paresthesia pain: none  Slow fractionated injection: yes  Hemodynamics: stable  Additional Notes  Total of 30cc of 0.5% ropivicaine with epi 1:200K and decadron 10mg injected  Reason for block: post-op pain management

## 2021-05-13 NOTE — PROGRESS NOTES
RVSP of 13 mm Hg. The left atrium is Mild to moderate dilated. Signature   ----------------------------------------------------------------  Electronically signed by Fred Stacy DO(Interpreting  physician) on 05/12/2021 02:17 PM  ----------------------------------------------------------------   Findings  Left Ventricle Normal left ventricular systolic function, no regional wall motion abnormalities, estimated ejection fraction of 50%. Normal left ventricular size and function. Normal left ventricular wall thickness. Right Ventricle Normal right ventricle structure and function. Normal right ventricle systolic pressure. Left Atrium The left atrium is Mild to moderate dilated. Right Atrium Normal right atrium. Mitral Valve Mitral annular calcification is present. Diffusely thickened and pliable mitral valve leaflets with normal excursion. No evidence of mitral regurgitation. No evidence of mitral valve stenosis. Tricuspid Valve Normal tricuspid valve structure and function. There is mild tricuspid regurgitation with estimated RVSP of 13 mm Hg. Aortic Valve Sclerotic, trileaflet aortic valve with diffusely thickened leaflets with normal cusp separation and excursion. No evidence of aortic valve regurgitation . No evidence of aortic valve stenosis. Pulmonic Valve Normal pulmonic valve structure and function. Pericardial Effusion No evidence of pericardial effusion. Pleural Effusion No evidence of pleural effusion. Aorta \ Miscellaneous Miscellaneous normal findings were found.  Doppler Measurements:   TR Velocity:1.87 m/s  TR Gradient:13.96 mmHg                                      Estimated RAP:5 mmHg                                      RVSP:18.96 mmHg  Valves  Tricuspid Valve   Estimated RVSP: 18.96 mmHg              Estimated RAP: 5 mmHg  TR Velocity: 1.87 m/s                   TR Gradient: 13.96 mmHg   Pulmonic Valve            Estimated PASP: 18.96 mmHg  Structures  Right Atrium   RA Systolic Pressure: 5 mmHg   Right Ventricle            RV Systolic Pressure: 62.20 mmHg      Xr Wrist Right (min 3 Views)    Result Date: 5/12/2021  EXAMINATION:  XR WRIST RIGHT (MIN 3 VIEWS), XR HAND RIGHT (MIN 3 VIEWS) HISTORY:  Fall with wrist deformity. TECHNIQUE:  XR WRIST RIGHT (MIN 3 VIEWS), XR HAND RIGHT (MIN 3 VIEWS) COMPARISON: None RESULT: Right wrist/hand: Impacted comminuted mildly displaced mildly angled fracture involving the distal radius with apparent intra-articular extension. There also appears to be mildly displaced fracture involving the ulnar styloid. Associated soft tissue swelling. Remote healed fracture involving the fifth metacarpal. Osteopenia. Scattered degenerative changes. Vascular calcifications. No other significant abnormality. Complex distal radius fracture with intra-articular extension. Ulnar styloid fracture. Xr Hand Right (min 3 Views)    Result Date: 5/12/2021  EXAMINATION:  XR WRIST RIGHT (MIN 3 VIEWS), XR HAND RIGHT (MIN 3 VIEWS) HISTORY:  Fall with wrist deformity. TECHNIQUE:  XR WRIST RIGHT (MIN 3 VIEWS), XR HAND RIGHT (MIN 3 VIEWS) COMPARISON: None RESULT: Right wrist/hand: Impacted comminuted mildly displaced mildly angled fracture involving the distal radius with apparent intra-articular extension. There also appears to be mildly displaced fracture involving the ulnar styloid. Associated soft tissue swelling. Remote healed fracture involving the fifth metacarpal. Osteopenia. Scattered degenerative changes. Vascular calcifications. No other significant abnormality. Complex distal radius fracture with intra-articular extension. Ulnar styloid fracture. Xr Knee Right (3 Views)    Result Date: 5/12/2021  EXAMINATION:  XR KNEE RIGHT (3 VIEWS) HISTORY:  Fall, landed on the right knee and hand. TECHNIQUE:  XR KNEE RIGHT (3 VIEWS) COMPARISON: None RESULT: Acute transverse nondisplaced fracture involving the mid to lower pole of the patella. Associated moderate lipohemarthrosis.   No Visualized mastoid air cells are clear. Facial soft tissues are within normal limits. Degenerative changes of the visualized cervical spine. No acute facial bone fracture. All CT scans at this facility use dose modulation, iterative reconstruction, and/or weight based dosing when appropriate to reduce radiation dose to as low as reasonably achievable. Ct Knee Right Wo Contrast    Result Date: 5/12/2021  CT KNEE RIGHT WO CONTRAST HISTORY:  s/p mechanical fall with patellar fracture. Evaluation of tibial plateau TECHNIQUE: Routine CT of the right knee without contrast Contrast: None. All CT scans at this facility use dose modulation, iterative reconstruction, and/or weight based dosing when appropriate to reduce radiation dose to as low as reasonably achievable. COMPARISON: Right knee radiographs 5/11/2021. RESULT: Acute transverse nondisplaced fracture involving the mid to lower pole of the patella. Associated moderate lipohemarthrosis. No other distinct acute fractures about the knee. Underlying osteopenia or osteoporosis. Small osteophytes with grossly maintained joint spaces. Diffuse vascular calcifications. Superior patellar enthesophyte. Soft tissue swelling anteriorly. Muscle bulk grossly maintained. Acute nondisplaced fracture involving the patella with associated moderate lipohemarthrosis. . No other distinct fractures.     Assessment//Plan           Hospital Problems           Last Modified POA    * (Principal) Closed nondisp transvrs fracture of right patella with routine healing 5/11/2021 Yes    CAD (coronary artery disease) 5/11/2021 Yes    Hypertension 5/11/2021 Yes    Pure hypercholesterolemia 5/11/2021 Yes    Diabetes mellitus ([address] 5/11/2021 Yes    Pacemaker 5/11/2021 Yes    Cardiomyopathy ([address] 5/11/2021 Yes    Closed fracture of right distal radius and ulna 5/12/2021 Yes    Closed nondisplaced transverse fracture of right patella with routine healing 5/13/2021 Yes      radius, patella fracture  Past Medical History:   Diagnosis Date    Anemia     [facility]     CAD (coronary artery disease)     Cardiomyopathy ([facility] [address]     [facility]     Diabetes mellitus [facility]     Hypertension        Assessment & Plan  5/13: Going for surgery today, no overnight events, no new complaitns.,  Hold nephrotoxic agents for now for surgery. Continue with SSI coverage. A. fib is rate controlled. HTN is stable  Electronically signed by Laurie Perrin MD on 5/13/21 at 10:24 AM EDT The patient is a 66y Female complaining of

## 2021-05-13 NOTE — ANESTHESIA POSTPROCEDURE EVALUATION
Department of Anesthesiology  Postprocedure Note    Patient: Ian Goldberg  MRN: [id number]  YOB: 1928  Date of evaluation: 5/13/2021  Time:  1:19 PM     Procedure Summary     Date: 05/13/21 Room / Location: [address]    Anesthesia Start: 5058 Anesthesia Stop: 1864    Procedure: CLOSED  REDUCTION INTERNAL FIXATION RIGHT  DISTAL RADIUS PINNING (Right ) Diagnosis: (RADIUS FX)    Surgeons: Nyla English MD Responsible Provider: Elias Winn MD    Anesthesia Type: MAC ASA Status: 4          Anesthesia Type: MAC    Sunshine Phase I:      Sunshine Phase II:      Last vitals: Reviewed and per EMR flowsheets.        Anesthesia Post Evaluation    Patient location during evaluation: PACU  Patient participation: complete - patient participated  Level of consciousness: awake and alert  Airway patency: patent  Nausea & Vomiting: no nausea and no vomiting  Complications: no  Cardiovascular status: blood pressure returned to baseline and hemodynamically stable  Respiratory status: acceptable and nasal cannula  Hydration status: euvolemic

## 2021-05-13 NOTE — PROGRESS NOTES
Patient received from surgery, no complaints of pain in right upper extremity. Block prior to procedure to help control pain. Warming device/Mistral-Air given for comfort. Blood sugar at 1327  Was 256. Ancef given at 1252. Report given to Constantino Hawley on floor.

## 2021-05-13 NOTE — ANESTHESIA PRE PROCEDURE
Department of Anesthesiology  Preprocedure Note       Name:  Matt Thomas   Age:  80 y.o.  :  1928                                          MRN:  [id number]         Date:  2021      Surgeon: Lilibeth Penny):  Virginia Kirk MD    Procedure: Procedure(s):  OPEN REDUCTION INTERNAL FIXATION RIGHT  DISTAL RADIUS , SYNTHES ROOM 221 REQUESTING 12PM    Medications prior to admission:   Prior to Admission medications    Medication Sig Start Date End Date Taking? Authorizing Provider   donepezil (ARICEPT) 5 MG tablet Take 5 mg by mouth nightly   Yes Historical Provider, MD   aspirin 81 MG EC tablet Take 81 mg by mouth daily   Yes Historical Provider, MD   Ascorbic Acid (VITAMIN C) 250 MG tablet Take 250 mg by mouth daily   Yes Historical Provider, MD   lovastatin (MEVACOR) 20 MG tablet Take 20 mg by mouth nightly   Yes Historical Provider, MD   Magnesium 500 MG TABS Take 500 mg by mouth daily    Yes Historical Provider, MD   ferrous sulfate 325 (65 Fe) MG tablet Take 325 mg by mouth daily (with breakfast)   Yes Historical Provider, MD   vitamin D 1000 units CAPS Take 1,000 Units by mouth daily  12  Yes Historical Provider, MD   ezetimibe (ZETIA) 10 MG tablet Take 10 mg by mouth daily  18  Yes Historical Provider, MD   metFORMIN (GLUCOPHAGE) 1000 MG tablet Take 1,000 mg by mouth 2 times daily (with meals)  18  Yes Historical Provider, MD   SITagliptin (JANUVIA) 100 MG tablet Take 100 mg by mouth daily  18  Yes Historical Provider, MD   insulin glargine (LANTUS) 100 UNIT/ML injection pen 15 Units daily (with breakfast)  18  Yes Historical Provider, MD   gabapentin (NEURONTIN) 100 MG capsule Take 100 mg by mouth 2 times daily.  One tab in AM and one tab at [facility] 18  Yes Historical Provider, MD   PARoxetine (PAXIL) 20 MG tablet Take 20 mg by mouth nightly  10/21/17  Yes Historical Provider, MD   carvedilol (COREG) 3.125 MG tablet 3.125 mg 2 times daily  18  Yes Historical Provider, MD Current medications:    Current Facility-Administered Medications   Medication Dose Route Frequency Provider Last Rate Last Admin    ceFAZolin (ANCEF) 2000 mg in dextrose 3 % 50 mL IVPB (duplex)  2,000 mg Intravenous Once TRA Palomino CNP        0.9 % sodium chloride infusion   Intravenous Continuous Emani Kussmaul, MD 75 mL/hr at 05/13/21 1045 75 mL/hr at 05/13/21 1045    meperidine (DEMEROL) injection 12.5 mg  12.5 mg Intravenous Q5 Min PRN Janny Babin MD        fentaNYL (SUBLIMAZE) injection 50 mcg  50 mcg Intravenous Q10 Min PRN Janny Babin MD        HYDROmorphone (DILAUDID) injection 0.5 mg  0.5 mg Intravenous Q10 Min PRN Janny Babin MD        HYDROcodone-acetaminophen [facility]) 5-325 MG per tablet 1 tablet  1 tablet Oral PRN Janny Babin MD        Or    HYDROcodone-acetaminophen [facility]) 5-325 MG per tablet 2 tablet  2 tablet Oral PRN Janny Babin MD        ondansetron [facility]) injection 4 mg  4 mg Intravenous Once PRN Janny Babin MD        metoclopramide [facility]) injection 10 mg  10 mg Intravenous Once PRN Janny Babin MD        diphenhydrAMINE (BENADRYL) injection 12.5 mg  12.5 mg Intravenous Once PRN Janny Babin MD        carvedilol (COREG) tablet 3.125 mg  3.125 mg Oral BID WC Inhi BalTRA CNP   3.125 mg at 05/13/21 1044    ezetimibe (ZETIA) tablet 10 mg  10 mg Oral Nightly TRA Murphy CNP        atorvastatin (LIPITOR) tablet 10 mg  10 mg Oral Daily Inhi BalTRA CNP   10 mg at 05/12/21 2102    magnesium oxide (MAG-OX) tablet 400 mg  400 mg Oral Daily TRA Murphy CNP   400 mg at 05/12/21 1021    PARoxetine (PAXIL) tablet 20 mg  20 mg Oral Daily TRA Murphy CNP   20 mg at 05/12/21 1022    sodium chloride flush 0.9 % injection 5-40 mL  5-40 mL Intravenous 2 times per day Jairo Odell MD   10 mL at transverse fracture of right patella with routine healing S82.034D       Past Medical History:        Diagnosis Date    Anemia     Atrial fibrillation (HCC)     CAD (coronary artery disease)     Cardiomyopathy ([facility]     CH[facility]congestive heart failure) ([facility])     Diabetes mellitus ([facility]     Hypertension        Past Surgical History:        Procedure Laterality Date    APPENDECTOMY      CORONARY ANGIOPLASTY WITH STENT PLACEMENT      HIP FRACTURE SURGERY Left 12/2012    PACEMAKER INSERTION      TONSILLECTOMY      WRIST FRACTURE SURGERY Left 12/2012       Social History:    Social History     Tobacco Use    Smoking status: Former Smoker    Smokeless tobacco: Never Used   Substance Use Topics    Alcohol use: Not Currently                                Counseling given: Not Answered      Vital Signs (Current):   Vitals:    05/12/21 2105 05/13/21 0032 05/13/21 0646 05/13/21 1028   BP: (!) 145/55 (!) 118/51 (!) 156/69 (!) 152/66   Pulse: 68 85 87 84   Resp: 16 16 16 16   Temp: 98.2 °F (36.8 °C) 98.6 °F (37 °C) 98.1 °F (36.7 °C)    TempSrc: Oral Oral Oral Oral   SpO2: 97% 98% 99% 97%   Weight:       Height:                                                  BP Readings from Last 3 Encounters:   05/13/21 (!) 152/66   04/20/21 (!) 163/86   07/29/20 138/76       NPO Status: Time of last liquid consumption: 0000                        Time of last solid consumption: 2200                        Date of last liquid consumption: 05/13/21                        Date of last solid food consumption: 05/12/21    BMI:   Wt Readings from Last 3 Encounters:   05/11/21 100 lb (45.4 kg)   04/20/21 110 lb (49.9 kg)   01/29/20 110 lb (49.9 kg)     Body mass index is 16.14 kg/m².     CBC:   Lab Results   Component Value Date    WBC 7.4 05/12/2021    RBC 3.59 05/12/2021    HGB 10.8 05/12/2021    HCT 32.2 05/12/2021    MCV 89.7 05/12/2021    RDW 13.3 05/12/2021     05/12/2021       CMP:   Lab Results   Component Value Date  05/12/2021    K 3.6 05/12/2021    CL 95 05/12/2021    CO2 29 05/12/2021    BUN 25 05/12/2021    CREATININE 1.23 05/12/2021    GFRAA 49.3 05/12/2021    LABGLOM 40.7 05/12/2021    GLUCOSE 94 05/12/2021    PROT 5.9 05/12/2021    CALCIUM 10.1 05/12/2021    BILITOT 0.3 05/12/2021    ALKPHOS 52 05/12/2021    AST 15 05/12/2021    ALT 7 05/12/2021       POC Tests:   Recent Labs     05/13/21  7686   POCGLU 271*       Coags:   Lab Results   Component Value Date    PROTIME 14.1 05/12/2021    INR 1.1 05/12/2021    APTT 31.1 05/12/2021       HCG (If Applicable): No results found for: PREGTESTUR, PREGSERUM, HCG, HCGQUANT     ABGs: No results found for: PHART, PO2ART, EZC2CUY, HBO0JCQ, BEART, Q8EMADWC     Type & Screen (If Applicable):  No results found for: LABABO, LABRH    Drug/Infectious Status (If Applicable):  No results found for: HIV, HEPCAB    COVID-19 Screening (If Applicable):   Lab Results   Component Value Date    COVID19 Not Detected 05/11/2021           Anesthesia Evaluation  Patient summary reviewed and Nursing notes reviewed no history of anesthetic complications:   Airway: Mallampati: II  TM distance: >3 FB   Neck ROM: full  Mouth opening: > = 3 FB Dental:          Pulmonary:Negative Pulmonary ROS and normal exam                               Cardiovascular:    (+) hypertension: no interval change, pacemaker: no interval change and pacemaker, CAD: no interval change, CHF: no interval change,       ECG reviewed               Beta Blocker:  Dose within 24 Hrs         Neuro/Psych:   Negative Neuro/Psych ROS              GI/Hepatic/Renal:   (+) renal disease: CRI,           Endo/Other:    (+) Diabetes, blood dyscrasia: anemia:., .          Pt had PAT visit. Abdominal:           Vascular: negative vascular ROS. Anesthesia Plan      MAC     ASA 4     (Axillary block)  Induction: intravenous. MIPS: Prophylactic antiemetics administered.   Anesthetic plan and risks discussed with patient.         Attending anesthesiologist reviewed and agrees with Pre Eval content            Karla Villarreal MD   5/13/2021

## 2021-05-13 NOTE — PROGRESS NOTES
Comprehensive Nutrition Assessment    Type and Reason for Visit:  Initial, RD Nutrition Re-Screen/LOS(Low BMI)    Nutrition Recommendations/Plan:  Continue current diet and add JOSE restriction per CHF with edema. Will also provide diabetic supplement @ B and wound healing supplement BID @ L+D. Bed scale weight ordered    Nutrition Assessment:  Pt admitted for a fall with fracture of R patella. Unable to determine nutrition status pta. At nutritional risk secondary to advanced age, increased nutrient needs due to fracture, and low BMI. Will continue current diet and add JOSE restriction per CHF with edema. Will also provide diabetic supplement at breakfast and wound healing supplement BID @ L+D. Malnutrition Assessment:  Malnutrition Status:  Insufficient data    Context:  Chronic Illness     Findings of the 6 clinical characteristics of malnutrition:  Energy Intake:  Mild decrease in energy intake (Comment)  Weight Loss:  Unable to assess     Body Fat Loss:  Unable to assess     Muscle Mass Loss:  Unable to assess    Fluid Accumulation:  Unable to assess     Strength:  Not Performed    Estimated Daily Nutrient Needs:  Energy (kcal):  1130-1360kcal (25-30kcal/kg); Weight Used for Energy Requirements:  Admission(45.4kg)     Protein (g):  68g (1.5g/kg); Weight Used for Protein Requirements:  Current(45.4kg)        Fluid (ml/day):  ~1600m (35ml/kg); Method Used for Fluid Requirements:  ml/Kg      Nutrition Related Findings:  pmhx: anemia afib, cad, chf, dm, htn. +1 RUE, RLE edema. Labs noted(5/13)- Glu: . Last bm 5/13. Pt is from home alone. A&Ox3, very hard of hearing. Was NPO for sx, just advanced diet, no meals ordered yet only ate some violet crackers.       Wounds:  Surgical Incision       Current Nutrition Therapies:    DIET GENERAL; Carb Control: 5 carb choices (75 gms)/meal    Anthropometric Measures:  · Height: 5' 6\" (167.6 cm)  · Current Body Weight: (unknown please obtain new bedscale weight)   · Admission Body Weight: 100 lb (45.4 kg)(stated 5/11)    · Usual Body Weight: 104 lb (47.2 Marivel@[employer] 4/23/21; 110# stated 4/20/21)     · Ideal Body Weight: 130 lbs; % Ideal Body Weight   76%  · BMI:  16.14 based on stated admission wt  · BMI Categories: Underweight (BMI less than 22) age over 72       Nutrition Diagnosis:   · Increased nutrient needs related to increase demand for energy/nutrients as evidenced by wounds  · Underweight related to cognitive or neurological impairment as evidenced by BMI, poor intake prior to admission    Nutrition Interventions:   Food and/or Nutrient Delivery:  Continue Current Diet, Start Oral Nutrition Supplement(Continue current carb control 5 diet. Add JOSE restriction and provide diabetic supplement @ B with wound healing supplement @ L+D.)  Nutrition Education/Counseling:  No recommendation at this time   Coordination of Nutrition Care:  Continue to monitor while inpatient    Goals:  Intakes >75% of meals and supplements. Weight gain. Obtain new bedscale weight. Nutrition Monitoring and Evaluation:   Behavioral-Environmental Outcomes:  None Identified   Food/Nutrient Intake Outcomes:  Food and Nutrient Intake, Supplement Intake  Physical Signs/Symptoms Outcomes:  Biochemical Data, Weight, Skin, Fluid Status or Edema, Meal Time Behavior     Discharge Planning:     Too soon to determine     Electronically signed by Sophia Warner MS, RD, LD on 5/13/21 at 4:00 PM EDT

## 2021-05-13 NOTE — FLOWSHEET NOTE
Report called to short-stay surgery, patient going down by bed per transport. Daughter at bedside. Patient urinated on [facility] with one assist and voided without difficulty. Patient was given her Coreg PO with sip of water.

## 2021-05-13 NOTE — OP NOTE
Operative Note      Patient: Nain Elias  YOB: 1928  MRN: [id number]    Date of Procedure: 5/13/2021    Pre-Op Diagnosis: RADIUS FX    Post-Op Diagnosis: Same       Procedure(s):  CLOSED  REDUCTION INTERNAL FIXATION RIGHT  DISTAL RADIUS PINNING    Surgeon(s):  Madelaine Honeycutt MD    Assistant:   Physician Assistant: MARK Adorno    Anesthesia: Choice    Estimated Blood Loss (mL): Minimal    Complications: None    Specimens:   * No specimens in log *    Implants:  * No implants in log *      Drains: * No LDAs found *    Findings: Displaced distal radius fracture    Detailed Description of Procedure:     Preoperative diagnosis: Right angulated distal radius fracture    Postop diagnosis: Above    Procedure: Closed reduction percutaneous pinning of the right distal radius fracture    Blood loss: None    Surgeon: Madelaine Honeycutt M.D. Asst.: Brianna LOPEZ The physician assistant was present through the entire case. Given the nature of the disease process and the procedure to be performed a skilled surgical assistant was necessary during the case. The assistant was necessary in order to hold retractors and directly assist in the operation. A certified scrub tech was at the back table managing instruments and supplies for the surgical case. Anesthesia: MAC    HPI: Patient fell and sustained this injury as well as a right distal radius fracture. I discussed with her today risk benefits of surgical intervention. She is relatively highly active 80year-old. All of the risks, benefits, and potential complications for operative and nonoperative treatment were discussed at length with the patient. Risks of operative intervention include, but are not limited to: Anesthesia complications, extensive blood loss, infection, damaged uninjured structures, blood clots, and lack of improvement or worsening of symptoms.   These complications could result in death, permanent disability, or need for

## 2021-05-13 NOTE — DISCHARGE INSTR - COC
Continuity of Care Form    Patient Name: Bay Casper   :  1928  MRN:  [id number]    [facility] date:  2021  Discharge date:  5/15/2021    Code Status Order: Full Code   Advance Directives:   Advance Care Flowsheet Documentation       Date/Time Healthcare Directive Type of Healthcare Directive Copy in [address] Agent's Name Healthcare Agent's Phone Number    21 1001  Yes, patient has an advance directive for healthcare treatment  Living will  Yes, copy in chart  --  --  --    21 0410  Yes, patient has an advance directive for healthcare treatment  Living will  Yes, copy in chart  --  --  --    21 0824  Yes, patient has an advance directive for healthcare treatment  Living will  No, copy requested from family Daughter Claudia Gross to bring  --  --  --            Admitting Physician:  Manuel Maldonado MD  PCP: Kaelyn Ortiz MD    Discharging Nurse:  Sridevi Unit/Room#: A689/K772-88  Discharging Unit Phone Number: [phone]    Emergency Contact:   Extended Emergency Contact Information  Primary Emergency Contact: [address] Phone: [phone]  Mobile Phone: [phone]  Relation: Child    Past Surgical History:  Past Surgical History:   Procedure Laterality Date    APPENDECTOMY      CORONARY ANGIOPLASTY WITH STENT PLACEMENT      HIP FRACTURE SURGERY Left 2012    PACEMAKER INSERTION      TONSILLECTOMY      WRIST FRACTURE SURGERY Left 2012       Immunization History:   Immunization History   Administered Date(s) Administered    Pneumococcal Conjugate 13-valent (Bandar Gathers) 2016    Tdap (Boostrix, Adacel) 2018    Zoster Live (Zostavax) 03/15/2016       Active Problems:  Patient Active Problem List   Diagnosis Code    Ischemic cardiomyopathy I25.5    CAD (coronary artery disease) I25.10    Hypertension I10    Pure hypercholesterolemia E78.00    Closed nondisp transvrs

## 2021-05-13 NOTE — FLOWSHEET NOTE
Shift assessment complete. VSS. A/Ox4. Denies shortness of breath, chest pain and nausea. BS 89 HS snack given. Ambulated to [facility] with assistance, Tolerated well. Immobilizer stays in place to right knee and splint to right wrist CDI. No pain at this time, denied need for pain medication.

## 2021-05-14 LAB
ANION GAP SERPL CALCULATED.3IONS-SCNC: 14 MEQ/L (ref 9–15)
BASOPHILS ABSOLUTE: 0.1 K/UL (ref 0–0.2)
BASOPHILS RELATIVE PERCENT: 0.9 %
BUN BLDV-MCNC: 30 MG/DL (ref 8–23)
CALCIUM SERPL-MCNC: 9.2 MG/DL (ref 8.5–9.9)
CHLORIDE BLD-SCNC: 97 MEQ/L (ref 95–107)
CO2: 24 MEQ/L (ref 20–31)
CREAT SERPL-MCNC: 1.54 MG/DL (ref 0.5–0.9)
EOSINOPHILS ABSOLUTE: 0 K/UL (ref 0–0.7)
EOSINOPHILS RELATIVE PERCENT: 0 %
GFR AFRICAN AMERICAN: 38
GFR NON-AFRICAN AMERICAN: 31.4
GLUCOSE BLD-MCNC: 108 MG/DL (ref 60–115)
GLUCOSE BLD-MCNC: 310 MG/DL (ref 60–115)
GLUCOSE BLD-MCNC: 317 MG/DL (ref 60–115)
GLUCOSE BLD-MCNC: 330 MG/DL (ref 70–99)
GLUCOSE BLD-MCNC: 351 MG/DL (ref 60–115)
HCT VFR BLD CALC: 31.2 % (ref 37–47)
HEMOGLOBIN: 10.4 G/DL (ref 12–16)
LYMPHOCYTES ABSOLUTE: 1.1 K/UL (ref 1–4.8)
LYMPHOCYTES RELATIVE PERCENT: 12.1 %
MCH RBC QN AUTO: 30.5 PG (ref 27–31.3)
MCHC RBC AUTO-ENTMCNC: 33.5 % (ref 33–37)
MCV RBC AUTO: 91.2 FL (ref 82–100)
MONOCYTES ABSOLUTE: 0.6 K/UL (ref 0.2–0.8)
MONOCYTES RELATIVE PERCENT: 6.5 %
NEUTROPHILS ABSOLUTE: 7.4 K/UL (ref 1.4–6.5)
NEUTROPHILS RELATIVE PERCENT: 80.5 %
PDW BLD-RTO: 13.2 % (ref 11.5–14.5)
PERFORMED ON: ABNORMAL
PERFORMED ON: NORMAL
PLATELET # BLD: 170 K/UL (ref 130–400)
POTASSIUM REFLEX MAGNESIUM: 4.1 MEQ/L (ref 3.4–4.9)
POTASSIUM SERPL-SCNC: 4.1 MEQ/L (ref 3.4–4.9)
RBC # BLD: 3.42 M/UL (ref 4.2–5.4)
SODIUM BLD-SCNC: 135 MEQ/L (ref 135–144)
WBC # BLD: 9.3 K/UL (ref 4.8–10.8)

## 2021-05-14 PROCEDURE — 97535 SELF CARE MNGMENT TRAINING: CPT

## 2021-05-14 PROCEDURE — 6360000002 HC RX W HCPCS: Performed by: NURSE PRACTITIONER

## 2021-05-14 PROCEDURE — 36415 COLL VENOUS BLD VENIPUNCTURE: CPT

## 2021-05-14 PROCEDURE — 97162 PT EVAL MOD COMPLEX 30 MIN: CPT

## 2021-05-14 PROCEDURE — 6360000002 HC RX W HCPCS: Performed by: ORTHOPAEDIC SURGERY

## 2021-05-14 PROCEDURE — 2580000003 HC RX 258: Performed by: INTERNAL MEDICINE

## 2021-05-14 PROCEDURE — 1210000000 HC MED SURG R&B

## 2021-05-14 PROCEDURE — 80048 BASIC METABOLIC PNL TOTAL CA: CPT

## 2021-05-14 PROCEDURE — 6370000000 HC RX 637 (ALT 250 FOR IP): Performed by: INTERNAL MEDICINE

## 2021-05-14 PROCEDURE — 6370000000 HC RX 637 (ALT 250 FOR IP): Performed by: ORTHOPAEDIC SURGERY

## 2021-05-14 PROCEDURE — 2580000003 HC RX 258: Performed by: NURSE PRACTITIONER

## 2021-05-14 PROCEDURE — 6370000000 HC RX 637 (ALT 250 FOR IP): Performed by: NURSE PRACTITIONER

## 2021-05-14 PROCEDURE — 97116 GAIT TRAINING THERAPY: CPT

## 2021-05-14 PROCEDURE — 97166 OT EVAL MOD COMPLEX 45 MIN: CPT

## 2021-05-14 PROCEDURE — 2580000003 HC RX 258: Performed by: ORTHOPAEDIC SURGERY

## 2021-05-14 PROCEDURE — 85025 COMPLETE CBC W/AUTO DIFF WBC: CPT

## 2021-05-14 PROCEDURE — 99231 SBSQ HOSP IP/OBS SF/LOW 25: CPT | Performed by: PHYSICIAN ASSISTANT

## 2021-05-14 RX ORDER — INSULIN GLARGINE 100 [IU]/ML
10 INJECTION, SOLUTION SUBCUTANEOUS 2 TIMES DAILY
Status: DISCONTINUED | OUTPATIENT
Start: 2021-05-14 | End: 2021-05-15 | Stop reason: HOSPADM

## 2021-05-14 RX ORDER — TRAMADOL HYDROCHLORIDE 50 MG/1
50 TABLET ORAL EVERY 4 HOURS PRN
Qty: 28 TABLET | Refills: 0 | Status: SHIPPED | OUTPATIENT
Start: 2021-05-14 | End: 2021-05-21

## 2021-05-14 RX ORDER — SODIUM CHLORIDE, SODIUM LACTATE, POTASSIUM CHLORIDE, CALCIUM CHLORIDE 600; 310; 30; 20 MG/100ML; MG/100ML; MG/100ML; MG/100ML
INJECTION, SOLUTION INTRAVENOUS CONTINUOUS
Status: DISCONTINUED | OUTPATIENT
Start: 2021-05-14 | End: 2021-05-15 | Stop reason: HOSPADM

## 2021-05-14 RX ORDER — ALOGLIPTIN 6.25 MG/1
6.25 TABLET, FILM COATED ORAL DAILY
Status: DISCONTINUED | OUTPATIENT
Start: 2021-05-14 | End: 2021-05-15 | Stop reason: HOSPADM

## 2021-05-14 RX ORDER — SENNA AND DOCUSATE SODIUM 50; 8.6 MG/1; MG/1
1 TABLET, FILM COATED ORAL 2 TIMES DAILY
Qty: 20 TABLET | Refills: 0 | Status: SHIPPED | OUTPATIENT
Start: 2021-05-14 | End: 2021-05-24

## 2021-05-14 RX ADMIN — ASPIRIN 81 MG: 81 TABLET, COATED ORAL at 07:24

## 2021-05-14 RX ADMIN — ENOXAPARIN SODIUM 30 MG: 30 INJECTION SUBCUTANEOUS at 07:23

## 2021-05-14 RX ADMIN — CARVEDILOL 3.12 MG: 3.12 TABLET, FILM COATED ORAL at 16:40

## 2021-05-14 RX ADMIN — ATORVASTATIN CALCIUM 10 MG: 10 TABLET, FILM COATED ORAL at 21:24

## 2021-05-14 RX ADMIN — INSULIN GLARGINE 10 UNITS: 100 INJECTION, SOLUTION SUBCUTANEOUS at 09:59

## 2021-05-14 RX ADMIN — SODIUM CHLORIDE, PRESERVATIVE FREE 10 ML: 5 INJECTION INTRAVENOUS at 07:34

## 2021-05-14 RX ADMIN — SODIUM CHLORIDE, PRESERVATIVE FREE 10 ML: 5 INJECTION INTRAVENOUS at 07:24

## 2021-05-14 RX ADMIN — INSULIN LISPRO 8 UNITS: 100 INJECTION, SOLUTION INTRAVENOUS; SUBCUTANEOUS at 11:36

## 2021-05-14 RX ADMIN — INSULIN LISPRO 10 UNITS: 100 INJECTION, SOLUTION INTRAVENOUS; SUBCUTANEOUS at 07:24

## 2021-05-14 RX ADMIN — CARVEDILOL 3.12 MG: 3.12 TABLET, FILM COATED ORAL at 07:24

## 2021-05-14 RX ADMIN — ALOGLIPTIN 6.25 MG: 6.25 TABLET, FILM COATED ORAL at 16:40

## 2021-05-14 RX ADMIN — INSULIN GLARGINE 10 UNITS: 100 INJECTION, SOLUTION SUBCUTANEOUS at 21:25

## 2021-05-14 RX ADMIN — CEFAZOLIN SODIUM 2000 MG: 2 SOLUTION INTRAVENOUS at 04:26

## 2021-05-14 RX ADMIN — DOCUSATE SODIUM 50 MG AND SENNOSIDES 8.6 MG 1 TABLET: 8.6; 5 TABLET, FILM COATED ORAL at 21:24

## 2021-05-14 RX ADMIN — TRAMADOL HYDROCHLORIDE 50 MG: 50 TABLET, FILM COATED ORAL at 21:24

## 2021-05-14 RX ADMIN — SODIUM CHLORIDE, POTASSIUM CHLORIDE, SODIUM LACTATE AND CALCIUM CHLORIDE 75 ML/HR: 600; 310; 30; 20 INJECTION, SOLUTION INTRAVENOUS at 21:23

## 2021-05-14 RX ADMIN — KETOROLAC TROMETHAMINE 15 MG: 15 INJECTION, SOLUTION INTRAMUSCULAR; INTRAVENOUS at 02:19

## 2021-05-14 RX ADMIN — TRAMADOL HYDROCHLORIDE 50 MG: 50 TABLET, FILM COATED ORAL at 07:23

## 2021-05-14 RX ADMIN — PAROXETINE HYDROCHLORIDE 20 MG: 20 TABLET, FILM COATED ORAL at 07:24

## 2021-05-14 RX ADMIN — DOCUSATE SODIUM 50 MG AND SENNOSIDES 8.6 MG 1 TABLET: 8.6; 5 TABLET, FILM COATED ORAL at 07:24

## 2021-05-14 RX ADMIN — SODIUM CHLORIDE, POTASSIUM CHLORIDE, SODIUM LACTATE AND CALCIUM CHLORIDE: 600; 310; 30; 20 INJECTION, SOLUTION INTRAVENOUS at 09:59

## 2021-05-14 RX ADMIN — EZETIMIBE 10 MG: 10 TABLET ORAL at 21:24

## 2021-05-14 RX ADMIN — Medication 400 MG: at 07:24

## 2021-05-14 ASSESSMENT — PAIN SCALES - GENERAL
PAINLEVEL_OUTOF10: 3
PAINLEVEL_OUTOF10: 0

## 2021-05-14 ASSESSMENT — ENCOUNTER SYMPTOMS
COUGH: 0
DIARRHEA: 0
NAUSEA: 0
CHEST TIGHTNESS: 0
VOMITING: 0
SHORTNESS OF BREATH: 0
COLOR CHANGE: 0

## 2021-05-14 ASSESSMENT — PAIN DESCRIPTION - LOCATION: LOCATION: ARM;KNEE

## 2021-05-14 ASSESSMENT — PAIN - FUNCTIONAL ASSESSMENT: PAIN_FUNCTIONAL_ASSESSMENT: ACTIVITIES ARE NOT PREVENTED

## 2021-05-14 ASSESSMENT — PAIN DESCRIPTION - ORIENTATION: ORIENTATION: RIGHT

## 2021-05-14 NOTE — FLOWSHEET NOTE
Shift assessment complete. VS obtained. Patient alert and oriented. Medicated for pain via eMAR PRN orders. Denies chest pain, shortness of breath, and nausea. Spint in place to RUE. CDI. Will continue with plan of care.  Electronically signed by Mainor Oconnell RN on 5/14/2021 at 8:01 AM

## 2021-05-14 NOTE — PROGRESS NOTES
Physical Therapy Med Surg Initial Assessment  Facility/Department: [facility]  Room: N221/N221-01       NAME: Brent Lal  : 1928 (80 y.o.)  MRN: [id number]  CODE STATUS: Full Code    Date of Service: 2021    Patient Diagnosis(es): Closed nondisp transvrs fracture of right patella with routine healing [S82.034D]  Closed nondisplaced transverse fracture of right patella with routine healing [S82.034D]   Chief Complaint   Patient presents with    Fall     Patient Active Problem List    Diagnosis Date Noted    Closed nondisplaced transverse fracture of right patella with routine healing 2021    Closed fracture of right distal radius and ulna     Closed nondisp transvrs fracture of right patella with routine healing 2021    Diabetes mellitus ([address]     Pacemaker     Cardiomyopathy ([address]     Ischemic cardiomyopathy 2018    CAD (coronary artery disease) 2018    Hypertension 2018    Pure hypercholesterolemia 2018        Past Medical History:   Diagnosis Date    Anemia     Atrial fibrillation ([address]     CAD (coronary artery disease)     Cardiomyopathy ([address]     CHF (congestive heart failure) ([address]     Diabetes mellitus ([address]     Hypertension      Past Surgical History:   Procedure Laterality Date    APPENDECTOMY      CORONARY ANGIOPLASTY WITH STENT PLACEMENT      FOREARM SURGERY Right 2021    CLOSED  REDUCTION INTERNAL FIXATION RIGHT  DISTAL RADIUS PINNING performed by Shayan Anand MD [address] Left 2012    PACEMAKER INSERTION      TONSILLECTOMY      WRIST FRACTURE SURGERY Left 2012       Chart Reviewed: Yes  Patient assessed for rehabilitation services?: Yes  Family / Caregiver Present: No  General Comment  Comments: Pt resting in bed - agreeable to PT evaluation    Restrictions:  Restrictions/Precautions: Weight Bearing, Fall Risk  Lower Extremity Weight Bearing Restrictions  Right Lower Extremity Weight Bearing: Weight Bearing As Tolerated  Partial Weight Bearing Percentage Or Pounds: In knee immobilizer; no ROM R Knee  Upper Extremity Weight Bearing Restrictions  Right Upper Extremity Weight Bearing: Non Weight Bearing  Other: Ok for ROM of fingers, elbow and shoulder; ok to weight bear through elbow     SUBJECTIVE: Subjective: \"I don't know where my hearing aides are.\"    Pain  Pre Treatment Pain Screening  Comments / Details: denies    Post Treatment Pain Screening:   Pain Assessment  Pain Assessment: (denies)    Prior Level of Function:  Social/Functional History  Lives With: Alone  Type of Home: House  Home Layout: Two level, 1/2 bath on main level, Bed/Bath upstairs  Home Access: Stairs to enter with rails  Entrance Stairs - Number of Steps: 3-4  Bathroom Shower/Tub: Tub/Shower unit  Home Equipment: [employer]  ADL Assistance: Independent  Homemaking Assistance: Independent(Daughter takes her for groceries)  Ambulation Assistance: Independent(Cane PRN)  Transfer Assistance: Independent  Active : No  Patient's  Info: Daughter  Additional Comments: Pt states her daughter will help out however needed at home    OBJECTIVE:   Vision Exceptions: Wears glasses at all times  Hearing Exceptions: Hard of hearing/hearing concerns;Bilateral hearing aid    Cognition:  Overall Orientation Status: Within Functional Limits  Follows Commands: Within Functional Limits    Observation/Palpation  Observation: Pleasant and cooperative. Mildly impulsive. No acute distress. HOB, however improved once hearing aides donned. ROM:  RLE PROM: WFL  RLE General PROM: R knee NT  LLE PROM: WFL    Strength:  Strength RLE  Strength RLE: WFL  Strength LLE  Strength LLE: WFL    Neuro:  Balance  Sitting - Static: Good  Sitting - Dynamic: Good  Standing - Static: Fair;Good  Standing - Dynamic: Fair;Poor  Comments: Poor reactive balance; Impaired proactive balance. Absent hip and stepping strategies.      Motor Control  Gross Motor?: (Poor motor control R UE; poor body position awareness)  Sensation  Overall Sensation Status: WFL    Bed mobility  Rolling to Right: Moderate assistance  Supine to Sit: Moderate assistance  Scooting: Moderate assistance  Comment: Poor motor control to execute tasks requiring step by step cues and physical assist. Struggles to follow verbal sequencing. Poor trunk control during movement requiring ModA to maintain balance to scoot to EOB. Transfers  Sit to Stand: Minimal Assistance  Stand to sit: Minimal Assistance  Bed to Chair: Minimal assistance  Comment: Steadying assist provided. Hand over hand cues for hand placement and technique. Pt impulsive. Cues for extending R LE out in front prior to sitting, however pt struggles to comply. Ambulation  Ambulation?: Yes  Ambulation 1  Surface: level tile  Device: No Device;Hand-Held Assist;Large Jatin Rebekah  Assistance: Minimal assistance; Moderate assistance  Quality of Gait: Impulsive. Pt swings R UE (uncontrollably at times). Poor management of QC requiring cues for balance and sequencing - unable to effectively follow. Pt improves slightly without AD and even more with HHA. Pt requires constant cues for maintaining WB restrictions R UE d/t reaching for furniture and walls. Distance: 30ft X 2    Stairs/Curb  Stairs?: No         Activity Tolerance  Activity Tolerance: Patient Tolerated treatment well          PT Education  PT Education: Goals;PT Role;Plan of Care;Transfer Training;Gait Training;Functional Mobility Training;Weight-bearing Education;General Safety    ASSESSMENT:   Body structures, Functions, Activity limitations: Decreased functional mobility ; Decreased safe awareness;Decreased balance;Decreased coordination;Decreased strength;Decreased ADL status; Decreased vision/visual deficit; Decreased cognition  Decision Making: Medium Complexity  History: Med  Exam: Med  Clinical Presentation: Med    Prognosis: Good  Barriers to Learning: memory/cog    DISCHARGE

## 2021-05-14 NOTE — PROGRESS NOTES
[facility] OCCUPATIONAL THERAPY EVALUATION - ACUTE     NAME: Kirt Russell  : 1928 (80 y.o.)  MRN: [id number]  CODE STATUS: Full Code  Room: J447/W100-80    Date of Service: 2021    Patient Diagnosis(es): Closed nondisp transvrs fracture of right patella with routine healing [S82.034D]  Closed nondisplaced transverse fracture of right patella with routine healing [S82.034D]   Chief Complaint   Patient presents with    Fall     Patient Active Problem List    Diagnosis Date Noted    Closed nondisplaced transverse fracture of right patella with routine healing 2021    Closed fracture of right distal radius and ulna     Closed nondisp transvrs fracture of right patella with routine healing 2021    Diabetes mellitus ([address]     Pacemaker     Cardiomyopathy ([address]     Ischemic cardiomyopathy 2018    CAD (coronary artery disease) 2018    Hypertension 2018    Pure hypercholesterolemia 2018        Past Medical History:   Diagnosis Date    Anemia     Atrial fibrillation ([address]     CAD (coronary artery disease)     Cardiomyopathy ([address]     CHF (congestive heart failure) ([address]     Diabetes mellitus ([address]     Hypertension      Past Surgical History:   Procedure Laterality Date    APPENDECTOMY      CORONARY ANGIOPLASTY WITH STENT PLACEMENT      FOREARM SURGERY Right 2021    CLOSED  REDUCTION INTERNAL FIXATION RIGHT  DISTAL RADIUS PINNING performed by Janes Mejia MD at [address] Left 2012    PACEMAKER INSERTION      TONSILLECTOMY      WRIST FRACTURE SURGERY Left 2012        Restrictions  Restrictions/Precautions: Weight Bearing, Fall Risk  Lower Extremity Weight Bearing Restrictions  Right Lower Extremity Weight Bearing: Weight Bearing As Tolerated  Partial Weight Bearing Percentage Or Pounds:  In knee immobilizer  Upper Extremity Weight Bearing Restrictions  Right Upper Extremity Weight Bearing: Non Weight Bearing  Other: Ok for ROM of fingers, elbow and shoulder     Safety Devices: Safety Devices  Safety Devices in place: Yes  Type of devices: All fall risk precautions in place        Subjective  Pre Treatment Pain Screening  Pain at present: 0  Scale Used: Numeric Score  Intervention List: Patient able to continue with treatment, Patient declined any intervention    Pain Reassessment:   Pain Assessment  Patient Currently in Pain: No  Pain Assessment: 0-10  Pain Level: 0       Prior Level of Function:  Social/Functional History  Lives With: Alone  Type of Home: House  Home Layout: Two level, 1/2 bath on main level, Bed/Bath upstairs  Home Access: Stairs to enter with rails  Entrance Stairs - Number of Steps: 3-4  Bathroom Shower/Tub: Tub/Shower unit  Home Equipment: Lashae Meo  ADL Assistance: Independent  Homemaking Assistance: Independent(Daughter takes her for groceries)  Ambulation Assistance: Independent(Cane [employer]N)  Transfer Assistance: Independent  Active : No  Patient's  Info: Daughter  Additional Comments: Pt states her daughter will help out however needed at home    OBJECTIVE:     Orientation Status:  Orientation  Overall Orientation Status: Within Functional Limits    Observation:  Observation/Palpation  Posture: Good  Observation: Pt alert and attentive, pleasant. Cast on R hand to elbow, sling present but pt. does not keep on. Knee immobilizer adjusted for fit however short leg immobilizer does not appear to maintain ROM restriction well, RN notified of need for longer immobilizer.     Cognition Status:  Cognition  Overall Cognitive Status: Exceptions  Arousal/Alertness: Appropriate responses to stimuli  Following Commands: Inconsistently follows commands  Attention Span: Difficulty attending to directions, Difficulty dividing attention  Memory: Decreased recall of precautions, Decreased recall of recent events  Safety Judgement: Decreased awareness of need for assistance, Decreased awareness of need for safety  Problem Solving: Assistance required to generate solutions, Assistance required to identify errors made, Assistance required to implement solutions, Assistance required to correct errors made, Decreased awareness of errors  Insights: Not aware of deficits  Initiation: Requires cues for some  Sequencing: Requires cues for all  Cognition Comment: Pt. easily distractible, requires cues for redirection throughout    Perception Status:  Perception  Overall Perceptual Status: WFL    Sensation Status:  Sensation  Overall Sensation Status: [facility]    Vision and Hearing Status:  Vision  Vision: Impaired  Vision Exceptions: Wears glasses at all times  Hearing  Hearing: Exceptions to [facility]  Hearing Exceptions: Hard of hearing/hearing concerns, Bilateral hearing aid     ROM:   LUE AROM (degrees)  LUE AROM : WFL  LUE General AROM: Arthritic changes  Left Hand AROM (degrees)  Left Hand AROM: WFL  Left Hand General AROM: Arthritic changes  RUE AROM (degrees)  RUE AROM : WFL  RUE General AROM: WFL at shoulder and elbow  Right Hand AROM (degrees)  Right Hand General AROM: Limited d/t splint    Strength:  LUE Strength  Gross LUE Strength: Exceptions to [facility]  L Hand General: 3+/5  LUE Strength Comment: 3+/5 all planes  RUE Strength  Gross RUE Strength: Exceptions to [facility]  RUE Strength Comment: not formally assessed d/t restrictions    Coordination, Tone, Quality of Movement: Tone RUE  RUE Tone: Normotonic  Tone LUE  LUE Tone: Normotonic  Coordination  Movements Are Fluid And Coordinated: No  Coordination and Movement description: Fine motor impairments, Decreased speed, Decreased accuracy, Gross motor impairments, Right UE, Left UE  Quality of Movement Other  Comment: RUE gross motor deficits noted, LUE fine motor deficits    Hand Dominance:  Hand Dominance  Hand Dominance: Right    ADL Status:  ADL  Feeding: Setup  Grooming: Minimal assistance  UE Bathing: Moderate assistance  LE Bathing: Dependent/Total  UE Dressing:  Moderate assistance  LE Dressing: Dependent/Total  Toileting: Dependent/Total  Additional Comments: Simulated ADLs as above. Pt. with limited control of RUE at shoulder, requiring increased time for coordination, verbal and tactile cues  Toilet Transfers  Toilet - Technique: Ambulating  Equipment Used: Grab bars  Toilet Transfer: Minimal assistance  Toilet Transfers Comments: Verbal and tactile cues for hand placement      Educated pt. on toilet transfer techniques to improve independence and maintain positioning of R knee. Pt. requires increased time for mobility and verbal cues throughout to redirect. Pt with poor education understanding demo'd, requiring frequent repetition and rephrasing d/t [place] but also d/t decreased attention. Therapy key for assistance levels -   Independent = Pt. is able to perform task with no assistance but may require a device   Stand by assistance = Pt. does not perform task at an independent level but does not need physical assistance, requires verbal cues  Minimal, Moderate, Maximal Assistance = Pt. requires physical assistance (25%, 50%, 75% assist from helper) for task but is able to actively participate in task   Dependent = Pt. requires total assistance with task and is not able to actively participate with task completion     Functional Mobility:  Functional Mobility  Functional - Mobility Device: (Attempts ambulation with quad cane, however gait improves with no device)  Activity: To/from bathroom  Assist Level: Moderate assistance  Functional Mobility Comments: Min-mod, varies throughout. Pt. throws herself out of balance by flailing RUE to the side throughout ambulation despite repeated cues to relax into sling. Transfers  Sit to stand: Minimal assistance  Stand to sit: Minimal assistance    Bed Mobility  Bed mobility  Rolling to Right: Moderate assistance  Supine to Sit: Moderate assistance  Scooting:  Moderate assistance  Comment: Pt with poor motor control when executing tasks, requires step by step cues and physical assist. Pt. struggles to follow verbal sequencing, and demonstrates poor trunk control during movement requiring mod A to maintain balance when scooting to EOB    Seated and Standing Balance:  Balance  Sitting Balance: Supervision  Standing Balance: Moderate assistance    Functional Endurance:  Activity Tolerance  Activity Tolerance: Patient Tolerated treatment well, Treatment limited secondary to decreased cognition    D/C Recommendations:  OT D/C RECOMMENDATIONS  REQUIRES OT FOLLOW UP: Yes    Equipment Recommendations:  OT Equipment Recommendations  Other: Continue to assess    OT Education:   OT Education  OT Education: OT Role, Plan of Care  Patient Education: Educated pt. on role of acute care OT  Barriers to Learning: None    OT Follow Up:  OT D/C RECOMMENDATIONS  REQUIRES OT FOLLOW UP: Yes       Assessment/Discharge Disposition:  Assessment: Pt is a 80year old woman from home alone who presents to [facility] with the above deficits which impact her ability to perform ADLs and IADLs safely. Pt. would benefit from continued OT to maximize independence and safety with ADL tasks.   Performance deficits / Impairments: Decreased functional mobility , Decreased ADL status, Decreased strength, Decreased safe awareness, Decreased cognition, Decreased endurance, Decreased balance, Decreased high-level IADLs, Decreased fine motor control, Decreased coordination  Prognosis: Good  Discharge Recommendations: Continue to assess pending progress  Decision Making: Medium Complexity  History: Pt's medical history is moderately complex  Exam: Pt. has 10 performance deficits  Assistance / Modification: Pt. requires max A    Six Click Score   How much help for putting on and taking off regular lower body clothing?: Total  How much help for Bathing?: A Lot  How much help for Toileting?: A Lot  How much help for putting on and taking off regular upper body clothing?: A Lot  How much help for taking care of personal grooming?: A Little  How much help for eating meals?: A Little  AM-PAC Inpatient Daily Activity Raw Score: 13  AM-PAC Inpatient ADL T-Scale Score : 32.03  ADL Inpatient CMS 0-100% Score: 63.03    Plan:  Plan  Times per week: 1-4x  Plan weeks: Length of acute stay  Current Treatment Recommendations: Strengthening, Balance Training, Functional Mobility Training, Endurance Training, Safety Education & Training, Pain Management, Patient/Caregiver Education & Training, Equipment Evaluation, Education, & procurement, Home Management Training, Self-Care / ADL, Cognitive/Perceptual Training    Goals:   Patient will:    - Improve functional endurance to tolerate/complete 30 mins of ADL's  - Be min A in UB ADLs   - Be Min A in LB ADLs  - Be CGA in ADL transfers without LOB  - Be Min A in toileting tasks  - Improve L hand fine motor coordination to [facility] in order to manage clothing fasteners/self-care containers in a timely manner  - Improve L UE strength and endurance to 3+/5 in order to participate in self-care activities as projected. - Access appropriate D/C site with as few architectural barriers as possible. - Sequence self-care tasks with 50% verbal cues for safety techniques    Patient Goal: Patient goals : \"I want to get home\"     Discussed and agreed upon: Yes Comments:     Therapy Time:   OT Individual Minutes  Time In: 9712  Time Out: 0908  Minutes: 26    ADL/IADL training: 10 minutes  Eval: 16 minutes     Electronically signed by:     HERMELINDA Enamorado  5/14/2021, 10:15 AM

## 2021-05-14 NOTE — PROGRESS NOTES
Progress Note  Patient: Wade South  Unit/Bed: F260/E109-89  YOB: 1928  MRN: [id number]  Acct: [de-identified]   Admitting Diagnosis: Closed nondisp transvrs fracture of right patella with routine healing [S82.034D]  Closed nondisplaced transverse fracture of right patella with routine healing [S82.034D]  Date:  5/11/2021  Hospital Day: 1    Chief Complaint:  Right wrist pain    Subjective      5/14/21: Sitting up in chair and in no acute distress. Status post close reduction internal fixation of right distal radius fracture on 5/13/2021. Patient has no pain complaints at this time. Denies chest pain or shortness of breath. Hemodynamically stable. Blood pressure elevated on serial vitals most recently 150/93 this morning but acceptable range given advanced age. Developed CHIARA today with creatinine of 1.54 and GFR low at 31.4 compared to 1.05 and 48.9 yesterday. Echocardiogram completed preoperatively on 5/12/2021 revealed normal LV systolic function with EF of 50%, no significant valvular heart disease, mild TR with RVSP of 13 mmHg. 5/12/21: This is a very pleasant 68-year-old  female with past medical history significant for coronary artery disease status post remote PCI of the LAD and RCA, history of ischemic cardiomyopathy with last EF of 40% per echo in 2016, hypertension, dyslipidemia, diabetes, history of sick sinus syndrome/complete heart block status post pacemaker implant in 2016 multiple medical problems who presented to the emergency room yesterday evening with complaints of fall at home. Patient reports gardening yesterday at which time she tripped and fell landing on her right hand and knee. She denied any chest pain, shortness of breath, palpitations, dizziness, lightheadedness or syncope. Fall was mechanical in nature.   Due to subsequent right wrist and knee pain she was brought to the ER for further evaluation.     On presentation to the emergency room, blood pressure 130/69, heart rate 105, respiratory rate 16, pulse ox 96%, temperature 97.5 °F.  Sodium 134, potassium 3.6, chloride 95, total CO2 29, BUN elevated 25, creatinine elevated 1.23, GFR low at 40.7, glucose 94. WBC 7.4, hemoglobin 10.8, hematocrit 32.2, platelets 446. Rapid Covid testing negative. Right wrist x-ray showed a complex distal radius fracture with intra-articular extension, ulnar styloid fracture. Right knee x-ray showed acute transverse nondisplaced fracture involving the patella and associated lipohemarthrosis. CT head showed no acute intracranial process. She was admitted for further evaluation.     At time evaluation today, patient is seen on 2 N. resting comfortably and in no distress. She is extremely hard of hearing. Denies chest pain, shortness of breath, palpitations, dizziness, lightheadedness or syncope. Orthopedic surgery is planning surgical intervention of right distal radius fracture tomorrow once clearance obtained. Patient is known to have a history of CAD status post remote PCI with last documented intervention in 2010/2011. Known to have an ischemic cardiomyopathy with EF of 40% per echo in 2016. Repeat echocardiogram pending today. Currently on telemetry, she is sinus rhythm with V pacing with heart rate in the 80s. Last pacemaker interrogation from 4/27/2021 was reviewed and unremarkable. Review of Systems:   Review of Systems   Constitutional: Negative for activity change and fever. HENT: Negative for congestion. Respiratory: Negative for chest tightness and shortness of breath. Cardiovascular: Negative for chest pain, palpitations and leg swelling. Gastrointestinal: Negative for nausea. Genitourinary: Negative for difficulty urinating. Skin: Negative for color change. Neurological: Negative for dizziness and syncope. Psychiatric/Behavioral: Negative for agitation and behavioral problems.          Physical Examination:    BP (!) 150/93   Pulse 90   Temp 97.9 °F (36.6 °C) (Oral)   Resp 18   Ht 5' 6\" (1.676 m)   Wt 100 lb (45.4 kg)   SpO2 98%   BMI 16.14 kg/m²    Physical Exam  Constitutional:       General: She is not in acute distress. Appearance: Normal appearance. HENT:      Head: Normocephalic and atraumatic. Neck:      Musculoskeletal: Normal range of motion and neck supple. Cardiovascular:      Rate and Rhythm: Normal rate and regular rhythm. Pulmonary:      Effort: Pulmonary effort is normal. No respiratory distress. Breath sounds: No wheezing, rhonchi or rales. Abdominal:      Palpations: Abdomen is soft. Tenderness: There is no abdominal tenderness. Musculoskeletal:      Right lower leg: No edema. Left lower leg: No edema. Comments: Right UE in cast; ecchymosis of right hand    Skin:     General: Skin is warm and dry. Neurological:      General: No focal deficit present. Mental Status: She is alert and oriented to person, place, and time. Cranial Nerves: No cranial nerve deficit.    Psychiatric:         Mood and Affect: Mood normal.         Behavior: Behavior normal.         LABS:  CBC:   Lab Results   Component Value Date    WBC 9.3 05/14/2021    RBC 3.42 05/14/2021    HGB 10.4 05/14/2021    HCT 31.2 05/14/2021    MCV 91.2 05/14/2021    MCH 30.5 05/14/2021    MCHC 33.5 05/14/2021    RDW 13.2 05/14/2021     05/14/2021    MPV 7.5 01/31/2014     CBC with Differential:   Lab Results   Component Value Date    WBC 9.3 05/14/2021    RBC 3.42 05/14/2021    HGB 10.4 05/14/2021    HCT 31.2 05/14/2021     05/14/2021    MCV 91.2 05/14/2021    MCH 30.5 05/14/2021    MCHC 33.5 05/14/2021    RDW 13.2 05/14/2021    BANDSPCT 1 04/20/2021    LYMPHOPCT 12.1 05/14/2021    MONOPCT 6.5 05/14/2021    BASOPCT 0.9 05/14/2021    MONOSABS 0.6 05/14/2021    LYMPHSABS 1.1 05/14/2021    EOSABS 0.0 05/14/2021    BASOSABS 0.1 05/14/2021     CMP:    Lab Results   Component Value Date     05/14/2021    K 4.1 05/14/2021    K 4.1 05/14/2021    CL 97 05/14/2021    CO2 24 05/14/2021    BUN 30 05/14/2021    CREATININE 1.54 05/14/2021    GFRAA 38.0 05/14/2021    LABGLOM 31.4 05/14/2021    GLUCOSE 330 05/14/2021    PROT 5.9 05/12/2021    LABALBU 3.8 05/12/2021    CALCIUM 9.2 05/14/2021    BILITOT 0.3 05/12/2021    ALKPHOS 52 05/12/2021    AST 15 05/12/2021    ALT 7 05/12/2021     BMP:    Lab Results   Component Value Date     05/14/2021    K 4.1 05/14/2021    K 4.1 05/14/2021    CL 97 05/14/2021    CO2 24 05/14/2021    BUN 30 05/14/2021    LABALBU 3.8 05/12/2021    CREATININE 1.54 05/14/2021    CALCIUM 9.2 05/14/2021    GFRAA 38.0 05/14/2021    LABGLOM 31.4 05/14/2021    GLUCOSE 330 05/14/2021     Magnesium:    Lab Results   Component Value Date    MG 1.3 04/20/2021     Troponin:    Lab Results   Component Value Date    TROPONINI 0.014 04/20/2021       Radiology:  [facility]    Result Date: 5/13/2021  EXAMINATION:  FLUORO FOR SURGICAL PROCEDURES HISTORY:  Wrist fracture. Right wrist pinning. TECHNIQUE:  FLUORO FOR SURGICAL PROCEDURES COMPARISON: Radiographs 5/11/2021. RESULT: Fluoroscopy provided for surgical procedure performed by Georg Homans, with fluoroscopic time of 23.9 seconds and 2 image(s). Refer to performing provider note for further details. No diagnostic images. Interval placement of 2 percutaneous pins across the right distal radius fracture, with improved alignment. No other significant abnormality. Intraoperative imaging. Echocardiogram 5/12/21:  Conclusions      Summary   Normal left ventricular systolic function, no regional wall motion   abnormalities, estimated ejection fraction of 50%. Normal left ventricular size and function. Normal left ventricular wall thickness. No evidence of mitral regurgitation. No evidence of mitral valve stenosis. No evidence of aortic valve regurgitation . No evidence of aortic valve stenosis.    There is mild tricuspid recommendations  8. Orthopedic surgery recommendations--status post close reduction internal fixation of right distal radius pinning on 5/13/2021. Patient nonweightbearing to right upper extremity and weightbearing as tolerated on the right lower extremity and knee immobilizer  9.  Stable from cardiac standpoint        Electronically signed by MARK Hansen on 5/14/2021 at 1:53 PM

## 2021-05-14 NOTE — PROGRESS NOTES
Physical Therapy Med Surg Daily Treatment Note  Facility/Department: [address] NEURO  Room: 21/N2[facility]       NAME: Colletta Brightly  : 1928 (80 y.o.)  MRN: [id number]  CODE STATUS: Full Code    Date of Service: 2021    Patient Diagnosis(es): Closed nondisp transvrs fracture of right patella with routine healing [S82.034D]  Closed nondisplaced transverse fracture of right patella with routine healing [S82.034D]   Chief Complaint   Patient presents with    Fall     Patient Active Problem List    Diagnosis Date Noted    Closed nondisplaced transverse fracture of right patella with routine healing 2021    Closed fracture of right distal radius and ulna     Closed nondisp transvrs fracture of right patella with routine healing 2021    Diabetes mellitus ([address]     Pacemaker     Cardiomyopathy ([address]     Ischemic cardiomyopathy 2018    CAD (coronary artery disease) 2018    Hypertension 2018    Pure hypercholesterolemia 2018        Past Medical History:   Diagnosis Date    Anemia     Atrial fibrillation ([address]     CAD (coronary artery disease)     Cardiomyopathy ([address]     CHF (congestive heart failure) ([address]     Diabetes mellitus ([address]     Hypertension      Past Surgical History:   Procedure Laterality Date    APPENDECTOMY      CORONARY ANGIOPLASTY WITH STENT PLACEMENT      FOREARM SURGERY Right 2021    CLOSED  REDUCTION INTERNAL FIXATION RIGHT  DISTAL RADIUS PINNING performed by Yolande Izquierdo MD at [address] Left 2012    PACEMAKER INSERTION      TONSILLECTOMY      WRIST FRACTURE SURGERY Left 2012       Restrictions  Restrictions/Precautions: Weight Bearing; Fall Risk  Lower Extremity Weight Bearing Restrictions  Right Lower Extremity Weight Bearing: Weight Bearing As Tolerated  Partial Weight Bearing Percentage Or Pounds:  In knee immobilizer; no ROM R Knee  Upper Extremity Weight Bearing Restrictions  Right Upper Extremity Weight Bearing: Non Weight Bearing  Other: Ok for ROM of fingers, elbow and shoulder; ok to weight bear through elbow    SUBJECTIVE   General  Chart Reviewed: Yes  Family / Caregiver Present: No  Subjective  Subjective: where is my daughter    Pre-Session Pain Report     Pain Screening  Patient Currently in Pain: No  Pre Treatment Pain Screening  Pain at present: 0  Scale Used: Numeric Score  Intervention List: Patient able to continue with treatment    Post-Session Pain Report  Pain Assessment  Pain Assessment: 0-10  Pain Level: 0         OBJECTIVE             Transfers  Sit to Stand: Minimal Assistance  Stand to sit: Minimal Assistance  Bed to Chair: Minimal assistance  Comment: VC for hand placement, safety awareness , and decrease impulsive behaviors    Ambulation  Ambulation?: Yes  More Ambulation?: No  Ambulation 1  Surface: level tile  Device: No Device;Hand-Held Assist  Assistance: Minimal assistance; Moderate assistance  Quality of Gait: impulsive, quick pace, increased lateral sway, WBOS, unsteady turns  Distance: 30ft X 2                                         Activity Tolerance  Activity Tolerance: Patient Tolerated treatment well          ASSESSMENT     pt demonstrated impulsive tendencies during gait and transfers and did not follow cues for correction at this time. Discharge Recommendations:  Continue to assess pending progress, Patient would benefit from continued therapy after discharge    Goals  Short term goals  Short term goal 1: Pt to complete HEP with verbal cues  Short term goal 2: Pt to complete following activities maintaining proper orthopedic restrictions with verbal cues  Long term goals  Long term goal 1: Pt to complete bed mobility with supervision  Long term goal 2: Pt to complete transfers with SBA  Long term goal 3: Pt to ambulate 50-150ft with LRD/HHA and CGA    PLAN    Times per week: 5-7  Times per day: Twice a day  Safety Devices  Type of devices:  All fall risk precautions in place     [facility] (6 CLICK) [address] Mobility Raw Score : 14     Therapy Time   Individual   Time In 1305   Time Out 1316   Minutes 11     Gait:11        Roger Sorenson [address] [place] [address], 05/14/21 at 2:01 PM       Definitions for assistance levels  Independent = pt does not require any physical supervision or assistance from another person for activity completion. Device may be needed.   Stand by assistance = pt requires verbal cues or instructions from another person, close to but not touching, to perform the activity  Minimal assistance= pt performs 75% or more of the activity; assistance is required to complete the activity  Moderate assistance= pt performs 50% of the activity; assistance is required to complete the activity  Maximal assistance = pt performs 25% of the activity; assistance is required to complete the activity  Dependent = pt requires total physical assistance to accomplish the task

## 2021-05-14 NOTE — PROGRESS NOTES
DAILY PROGRESS NOTE      POD: 1 closed reduction internal fixation right distal radius pinning     Patient also has a closed non-displaced transverse fracture of right patella that is non-operatively that is being treated with knee immobilizer. Patient doing well  Vitals:    21 0723   BP: (!) 150/93   Pulse: 90   Resp: 18   Temp: 97.9 °F (36.6 °C)   SpO2: 98%      Temp (24hrs), Av.9 °F (36.6 °C), Min:97.3 °F (36.3 °C), Max:98.8 °F (37.1 °C)       Pain Control Good  No chest pain or shortness of breath. No calf pain    Exam:   Incision(s): cast in place unable to evaluate surgical incision   Dressing clean, dry, and intact  Operative extremity shows neuro vascular status intact. Flexion and extension intact on operative extremity  Calves soft and non-tender without evidence of DVT. Patient has ecchymosis noted to dorsal right hand and fingers. The ecchymotic area is soft to palpation and to be expected due to manipulation of extremity during surgery. Patient's labs reviewed showing a slight decrease in   . Labs reviewed:  CBC:   Recent Labs     21  0559 21  0545   WBC 7.4 9.3   HGB 10.8* 10.4*    170     BMP:    Recent Labs     21  0559 21  1101 21  0545   * 136 135   K 3.6 4.0 4.1  4.1   CL 95 95 97   CO2 29 29 24   BUN 25* 19 30*   CREATININE 1.23* 1.05* 1.54*   GLUCOSE 94 265* 330*       I&O  I/O last 3 completed shifts: In: 2303.8 [P.O.:700; I.V.:1503.8; IV Piggyback:100]  Out: -       Assessment:  Good Post Operative Course. Patient to be non weight bearing to operative extremity. May use platform walker with PT. Patient to perform ROM to shoulder, elbow and fingers of operative extremity. Patient to remain in knee immobilizer to right lower extremity. May WBAT to right lower extremity. Patient non weight bearing to right upper extremity. May perform ROM to right shoulder, elbow and fingers.  May use platform walker with PT. May remove knee immobilizer for skin care and hygiene. Plan:    1. Continue post-op course  2. Continue PT: non weight bearing to right upper extremity, may use platform walker, ROM to right shoulder, elbow and fingers, WBAT to right lower extremity in knee immobilizer. 3. Continue pain control  4.  Discharge planning     Piter Zheng MD  5/14/2021 7:44 AM

## 2021-05-14 NOTE — CARE COORDINATION
Pt will be a DC to [facility]. Priscila Romo is aware and [facility] is ready for the pt to admit.  [id number] completed.

## 2021-05-14 NOTE — PROGRESS NOTES
Progress Note  Date:2021       [facility]  Patient Name:Cordelia Reilly     Date of Birth:[birth year]     Age:89 y.o. Subjective    Subjective:  Symptoms:  No shortness of breath, malaise, cough, chest pain, weakness, headache, chest pressure, anorexia, diarrhea or anxiety. Diet:  No nausea or vomiting. Review of Systems   Respiratory: Negative for cough and shortness of breath. Cardiovascular: Negative for chest pain. Gastrointestinal: Negative for anorexia, diarrhea, nausea and vomiting. Neurological: Negative for weakness. Objective         Vitals Last 24 Hours:  TEMPERATURE:  Temp  Av.1 °F (36.7 °C)  Min: 97.5 °F (36.4 °C)  Max: 98.8 °F (37.1 °C)  RESPIRATIONS RANGE: Resp  Av.1  Min: 0  Max: 18  PULSE OXIMETRY RANGE: SpO2  Av.4 %  Min: 93 %  Max: 100 %  PULSE RANGE: Pulse  Av.1  Min: 39  Max: 156  BLOOD PRESSURE RANGE: Systolic (09OLX), CRUZ:650 , Min:133 , LDB:400   ; Diastolic (25RGM), DCW:31, Min:52, Max:93    I/O (24Hr): Intake/Output Summary (Last 24 hours) at 2021 1208  Last data filed at 2021 2356  Gross per 24 hour   Intake 2303.75 ml   Output --   Net 2303.75 ml     Objective:  General Appearance:  Comfortable, well-appearing and in no acute distress. Vital signs: (most recent): Blood pressure (!) 150/93, pulse 90, temperature 97.9 °F (36.6 °C), temperature source Oral, resp. rate 18, height 5' 6\" (1.676 m), weight 100 lb (45.4 kg), SpO2 98 %, currently breastfeeding. HEENT: Normal HEENT exam.    Lungs:  Normal effort. Heart: Normal rate. Regular rhythm. S1 normal and S2 normal.    Abdomen: Abdomen is soft. Bowel sounds are normal.   There is no epigastric area or suprapubic area tenderness. Pulses: Distal pulses are intact. Neurological: Patient is alert. Pupils:  Pupils are equal, round, and reactive to light. Skin:  Warm.       Labs/Imaging/Diagnostics    Labs:  CBC:  Recent Labs     21  0530 05/14/21  0545   WBC 7.4 9.3   RBC 3.59* 3.42*   HGB 10.8* 10.4*   HCT 32.2* 31.2*   MCV 89.7 91.2   RDW 13.3 13.2    170     CHEMISTRIES:  Recent Labs     05/12/21  0559 05/13/21  1101 05/14/21  0545   * 136 135   K 3.6 4.0 4.1  4.1   CL 95 95 97   CO2 29 29 24   BUN 25* 19 30*   CREATININE 1.23* 1.05* 1.54*   GLUCOSE 94 265* 330*     PT/INR:  Recent Labs     05/12/21  0559   PROTIME 14.1   INR 1.1     APTT:  Recent Labs     05/12/21  0559   APTT 31.1     LIVER PROFILE:  Recent Labs     05/12/21  0559   AST 15   ALT 7   BILITOT 0.3   ALKPHOS 52       Imaging Last 24 Hours:  Echo Complete 2d W Doppler W Color    Result Date: 5/12/2021  Transthoracic Echocardiography Report (TTE)  Demographics   Patient Name    Larry Jama Gender               Female   Patient Number  [id number]       Race                                                   Ethnicity   Visit Number    [id number]      Room Number          N221   Corporate ID                   Date of Study        05/12/2021   Accession       [id number]     Referring Physician  Rod Mcbride  Number   Date of Birth   05/04/1928     Sonographer          Karmen Ramirez   Age             80 year(s)     Interpreting         [facility])                                 Physician            Cardiology                                                      [facility] DO  Procedure Type of Study   TTE procedure:ECHO COMPLETE 2D W/DOP W/COLOR. Procedure Date Date: 05/12/2021 Start: 11:17 AM Study Location: Portable Technical Quality: Adequate visualization Indications:Chest pain. Patient Status: Routine Height: 66 inches Weight: 100 pounds BSA: 1.49 m^2 BMI: 16.14 kg/m^2 BP: 119/55 mmHg  Conclusions   Summary  Normal left ventricular systolic function, no regional wall motion  abnormalities, estimated ejection fraction of 50%. Normal left ventricular size and function. Normal left ventricular wall thickness. No evidence of mitral regurgitation.   No TR Velocity: 1.87 m/s                   TR Gradient: 13.96 mmHg   Pulmonic Valve            Estimated PASP: 18.96 mmHg  Structures  Right Atrium   RA Systolic Pressure: 5 mmHg   Right Ventricle            RV Systolic Pressure: 78.41 mmHg      Xr Wrist Right (min 3 Views)    Result Date: 5/12/2021  EXAMINATION:  XR WRIST RIGHT (MIN 3 VIEWS), XR HAND RIGHT (MIN 3 VIEWS) HISTORY:  Fall with wrist deformity. TECHNIQUE:  XR WRIST RIGHT (MIN 3 VIEWS), XR HAND RIGHT (MIN 3 VIEWS) COMPARISON: None RESULT: Right wrist/hand: Impacted comminuted mildly displaced mildly angled fracture involving the distal radius with apparent intra-articular extension. There also appears to be mildly displaced fracture involving the ulnar styloid. Associated soft tissue swelling. Remote healed fracture involving the fifth metacarpal. Osteopenia. Scattered degenerative changes. Vascular calcifications. No other significant abnormality. Complex distal radius fracture with intra-articular extension. Ulnar styloid fracture. Xr Hand Right (min 3 Views)    Result Date: 5/12/2021  EXAMINATION:  XR WRIST RIGHT (MIN 3 VIEWS), XR HAND RIGHT (MIN 3 VIEWS) HISTORY:  Fall with wrist deformity. TECHNIQUE:  XR WRIST RIGHT (MIN 3 VIEWS), XR HAND RIGHT (MIN 3 VIEWS) COMPARISON: None RESULT: Right wrist/hand: Impacted comminuted mildly displaced mildly angled fracture involving the distal radius with apparent intra-articular extension. There also appears to be mildly displaced fracture involving the ulnar styloid. Associated soft tissue swelling. Remote healed fracture involving the fifth metacarpal. Osteopenia. Scattered degenerative changes. Vascular calcifications. No other significant abnormality. Complex distal radius fracture with intra-articular extension. Ulnar styloid fracture. Xr Knee Right (3 Views)    Result Date: 5/12/2021  EXAMINATION:  XR KNEE RIGHT (3 VIEWS) HISTORY:  Fall, landed on the right knee and hand.  TECHNIQUE:  XR KNEE RIGHT (3 VIEWS) COMPARISON: None RESULT: Acute transverse nondisplaced fracture involving the mid to lower pole of the patella. Associated moderate lipohemarthrosis. No other distinct acute fractures about the knee. Underlying osteopenia or osteoporosis. Small osteophytes with grossly maintained joint spaces. Diffuse vascular calcifications. Superior patellar enthesophyte. No other significant abnormality. Acute transverse nondisplaced fracture involving the patella with associated lipohemarthrosis. Ct Head Wo Contrast    Result Date: 5/11/2021  EXAMINATION: CT of the brain without contrast HISTORY: Pain after a fall COMPARISON: None available TECHNIQUE: Multiple contiguous axial images were obtained of the brain from the skull base through the vertex. Multiplanar reformats were obtained. FINDINGS: Prominence of the sulci and ventricles compatible with moderate generalized parenchymal volume loss. Gray-white matter differentiation is preserved. Areas of bilateral supratentorial white matter hypoattenuation are nonspecific but most likely related to  chronic small vessel ischemic changes in a patient of this age. No acute hemorrhage or abnormal extra-axial fluid collection. No mass effect or midline shift. The visualized paranasal sinuses and mastoid air cells are clear. Calvarium is intact. No acute intracranial process. Generalized parenchymal volume loss and nonspecific white matter findings most compatible with chronic small vessel ischemic changes in a patient of this age. All CT scans at this facility use dose modulation, iterative reconstruction, and/or weight based dosing when appropriate to reduce radiation dose to as low as reasonably achievable. Ct Facial Bones Wo Contrast    Result Date: 5/11/2021  EXAM: CT FACIAL BONES WO CONTRAST HISTORY: Pain after a fall TECHNIQUE: Multiple contiguous axial images were obtained of the facial bones without contrast. Multiplanar reformats were obtained. fracture of right distal radius and ulna 5/12/2021 Yes    Closed nondisplaced transverse fracture of right patella with routine healing 5/13/2021 Yes      radius, patella fracture  Past Medical History:   Diagnosis Date    Anemia     Atrial fibrillation (HCC)     CAD (coronary artery disease)     [facility]     CHF (congestive heart failure) ([facility]     Diabetes mellitus ([facility]     Hypertension        Assessment & Plan    5/13: Going for surgery today, no overnight events, no new complaitns.,  Hold nephrotoxic agents for now for surgery. Continue with SSI coverage. A. fib is rate controlled. HTN is stable  5/14: Noted acute kidney injury and hyperglycemia. Add Lantus twice daily for better control and Januvia. Repeat renal function tomorrow. Hold nephrotoxic agent. Continue with PT OT.   Electronically signed by Victorina Machuca MD on 5/13/21 at 10:24 AM EDT

## 2021-05-15 VITALS
DIASTOLIC BLOOD PRESSURE: 74 MMHG | SYSTOLIC BLOOD PRESSURE: 167 MMHG | BODY MASS INDEX: 16.07 KG/M2 | OXYGEN SATURATION: 98 % | WEIGHT: 100 LBS | TEMPERATURE: 98.8 F | HEART RATE: 89 BPM | HEIGHT: 66 IN | RESPIRATION RATE: 18 BRPM

## 2021-05-15 LAB
ANION GAP SERPL CALCULATED.3IONS-SCNC: 11 MEQ/L (ref 9–15)
BUN BLDV-MCNC: 37 MG/DL (ref 8–23)
CALCIUM SERPL-MCNC: 9.4 MG/DL (ref 8.5–9.9)
CHLORIDE BLD-SCNC: 101 MEQ/L (ref 95–107)
CO2: 25 MEQ/L (ref 20–31)
CREAT SERPL-MCNC: 1.13 MG/DL (ref 0.5–0.9)
GFR AFRICAN AMERICAN: 54.4
GFR NON-AFRICAN AMERICAN: 44.9
GLUCOSE BLD-MCNC: 133 MG/DL (ref 60–115)
GLUCOSE BLD-MCNC: 275 MG/DL (ref 60–115)
GLUCOSE BLD-MCNC: 67 MG/DL (ref 70–99)
GLUCOSE BLD-MCNC: 70 MG/DL (ref 60–115)
PERFORMED ON: ABNORMAL
PERFORMED ON: ABNORMAL
PERFORMED ON: NORMAL
POTASSIUM SERPL-SCNC: 3.7 MEQ/L (ref 3.4–4.9)
SARS-COV-2, NAAT: NOT DETECTED
SODIUM BLD-SCNC: 137 MEQ/L (ref 135–144)

## 2021-05-15 PROCEDURE — 6360000002 HC RX W HCPCS: Performed by: ORTHOPAEDIC SURGERY

## 2021-05-15 PROCEDURE — 6370000000 HC RX 637 (ALT 250 FOR IP): Performed by: INTERNAL MEDICINE

## 2021-05-15 PROCEDURE — 97116 GAIT TRAINING THERAPY: CPT

## 2021-05-15 PROCEDURE — 6370000000 HC RX 637 (ALT 250 FOR IP): Performed by: NURSE PRACTITIONER

## 2021-05-15 PROCEDURE — 2580000003 HC RX 258: Performed by: INTERNAL MEDICINE

## 2021-05-15 PROCEDURE — 36415 COLL VENOUS BLD VENIPUNCTURE: CPT

## 2021-05-15 PROCEDURE — 87635 SARS-COV-2 COVID-19 AMP PRB: CPT

## 2021-05-15 PROCEDURE — 80048 BASIC METABOLIC PNL TOTAL CA: CPT

## 2021-05-15 PROCEDURE — 99232 SBSQ HOSP IP/OBS MODERATE 35: CPT | Performed by: INTERNAL MEDICINE

## 2021-05-15 RX ADMIN — ALOGLIPTIN 6.25 MG: 6.25 TABLET, FILM COATED ORAL at 10:47

## 2021-05-15 RX ADMIN — DOCUSATE SODIUM 50 MG AND SENNOSIDES 8.6 MG 1 TABLET: 8.6; 5 TABLET, FILM COATED ORAL at 10:47

## 2021-05-15 RX ADMIN — PAROXETINE HYDROCHLORIDE 20 MG: 20 TABLET, FILM COATED ORAL at 10:47

## 2021-05-15 RX ADMIN — ASPIRIN 81 MG: 81 TABLET, COATED ORAL at 10:47

## 2021-05-15 RX ADMIN — SODIUM CHLORIDE, POTASSIUM CHLORIDE, SODIUM LACTATE AND CALCIUM CHLORIDE: 600; 310; 30; 20 INJECTION, SOLUTION INTRAVENOUS at 10:54

## 2021-05-15 RX ADMIN — Medication 400 MG: at 10:47

## 2021-05-15 RX ADMIN — CARVEDILOL 3.12 MG: 3.12 TABLET, FILM COATED ORAL at 18:02

## 2021-05-15 RX ADMIN — ENOXAPARIN SODIUM 30 MG: 30 INJECTION SUBCUTANEOUS at 10:47

## 2021-05-15 RX ADMIN — CARVEDILOL 3.12 MG: 3.12 TABLET, FILM COATED ORAL at 10:48

## 2021-05-15 RX ADMIN — INSULIN LISPRO 6 UNITS: 100 INJECTION, SOLUTION INTRAVENOUS; SUBCUTANEOUS at 10:54

## 2021-05-15 RX ADMIN — INSULIN GLARGINE 10 UNITS: 100 INJECTION, SOLUTION SUBCUTANEOUS at 09:30

## 2021-05-15 ASSESSMENT — ENCOUNTER SYMPTOMS
NAUSEA: 0
SHORTNESS OF BREATH: 0
COUGH: 0
COLOR CHANGE: 0
DIARRHEA: 0
CHEST TIGHTNESS: 0
VOMITING: 0

## 2021-05-15 ASSESSMENT — PAIN SCALES - GENERAL: PAINLEVEL_OUTOF10: 0

## 2021-05-15 NOTE — PROGRESS NOTES
Weight Bearing: Non Weight Bearing  Other: Ok for ROM of fingers, elbow and shoulder; ok to weight bear through elbow    SUBJECTIVE   General  Chart Reviewed: Yes  Family / Caregiver Present: No  Subjective  Subjective: \"Okay. \"    Pre-Session Pain Report  Pre Treatment Pain Screening  Pain at present: 0  Scale Used: Numeric Score  Intervention List: Patient able to continue with treatment  Pain Screening  Patient Currently in Pain: No       Post-Session Pain Report  Pain Assessment  Pain Assessment: 0-10  Pain Level: 0         OBJECTIVE        Bed mobility  Supine to Sit: Minimal assistance  Sit to Supine:  (DNT pt into chair.)  Comment: step by step cues for technique and sequencing, pt attempting to use RUE, cues given to maintain NWB RUE. Transfers  Sit to Stand: Minimal Assistance  Stand to sit: Minimal Assistance  Bed to Chair: Minimal assistance  Comment: VC for hand placement, safety awareness. pt slightly impulsive. Ambulation  Ambulation?: Yes  Ambulation 1  Surface: level tile  Device: No Device  Assistance: Contact guard assistance  Quality of Gait: impulsive, quick pace, increased lateral sway, WBOS, unsteady turns  Distance: 30ft X 2  Comments: pt with improving gait, decreased assistance needed. distance limited by fatigue. Exercises  Quad Sets: x 10  Gluteal Sets: x 10  Ankle Pumps: x 10                    Activity Tolerance  Activity Tolerance: Patient Tolerated treatment well          ASSESSMENT   Assessment: pt with improving gait, impulsive at times, max education needed for maintaining NWB RUE.      Discharge Recommendations:  Continue to assess pending progress, Patient would benefit from continued therapy after discharge    Goals  Short term goals  Short term goal 1: Pt to complete HEP with verbal cues  Short term goal 2: Pt to complete following activities maintaining proper orthopedic restrictions with verbal cues  Long term goals  Long term goal 1: Pt to complete bed mobility with supervision  Long term goal 2: Pt to complete transfers with SBA  Long term goal 3: Pt to ambulate 50-150ft with LRD/HHA and CGA    PLAN    Times per week: 5-7  Times per day: Twice a day  Safety Devices  Type of devices: All fall risk precautions in place, Call light within reach, Left in chair, Chair alarm in place     [facility] (6 CLICK) [address] Raw Score : 16      Therapy Time   Individual   Time In 0950   Time Out 1004   Minutes 14      Gait: 8  BM/Trsf: 4  Therex: 2        Jessenia Kline PTA, 05/15/21 at 10:35 AM         Definitions for assistance levels  Independent = pt does not require any physical supervision or assistance from another person for activity completion. Device may be needed.   Stand by assistance = pt requires verbal cues or instructions from another person, close to but not touching, to perform the activity  Minimal assistance= pt performs 75% or more of the activity; assistance is required to complete the activity  Moderate assistance= pt performs 50% of the activity; assistance is required to complete the activity  Maximal assistance = pt performs 25% of the activity; assistance is required to complete the activity  Dependent = pt requires total physical assistance to accomplish the task

## 2021-05-15 NOTE — DOWNTIME EVENT NOTE
The EMR was down for 4.5  hours on 5/15/2021.     The following information was re-entered into the system by Josselyn Tucker: Flowsheet data and ANABEL Nava  5/15/2021

## 2021-05-15 NOTE — CARE COORDINATION
PHONED NIKO AT [facility] AND SHE STATES SHE NEEDS TO CONFIRM PATIENT'S ARRIVAL TODAY WITH HER ADMISSIONS DIRECTOR. SHE TO CALL ME BACK. NOTIFIED RN.

## 2021-05-15 NOTE — PROGRESS NOTES
Physical Therapy Med Surg Daily Treatment Note  Facility/Department: [address] NEURO  Room: 21/N2[facility]       NAME: Edi Peterson  : 1928 (80 y.o.)  MRN: [id number]  CODE STATUS: Full Code    Date of Service: 5/15/2021    Patient Diagnosis(es): Closed nondisp transvrs fracture of right patella with routine healing [S82.034D]  Closed nondisplaced transverse fracture of right patella with routine healing [S82.034D]   Chief Complaint   Patient presents with    Fall     Patient Active Problem List    Diagnosis Date Noted    Closed nondisplaced transverse fracture of right patella with routine healing 2021    Closed fracture of right distal radius and ulna     Closed nondisp transvrs fracture of right patella with routine healing 2021    Diabetes mellitus ([address]     Pacemaker     Cardiomyopathy ([address]     Ischemic cardiomyopathy 2018    CAD (coronary artery disease) 2018    Hypertension 2018    Pure hypercholesterolemia 2018        Past Medical History:   Diagnosis Date    Anemia     Atrial fibrillation ([address]     CAD (coronary artery disease)     Cardiomyopathy ([address]     CHF (congestive heart failure) ([address]     Diabetes mellitus ([address]     Hypertension      Past Surgical History:   Procedure Laterality Date    APPENDECTOMY      CORONARY ANGIOPLASTY WITH STENT PLACEMENT      FOREARM SURGERY Right 2021    CLOSED  REDUCTION INTERNAL FIXATION RIGHT  DISTAL RADIUS PINNING performed by Tanya Beal MD at [address] Left 2012    PACEMAKER INSERTION      TONSILLECTOMY      WRIST FRACTURE SURGERY Left 2012       Restrictions  Restrictions/Precautions: Weight Bearing; Fall Risk  Lower Extremity Weight Bearing Restrictions  Right Lower Extremity Weight Bearing: Weight Bearing As Tolerated  Partial Weight Bearing Percentage Or Pounds:  In knee immobilizer; no ROM R Knee  Upper Extremity Weight Bearing Restrictions  Right Upper Extremity

## 2021-05-15 NOTE — PROGRESS NOTES
Progress Note  Patient: Glenn Larsen  Unit/Bed: S259/N239-78  YOB: 1928  MRN: [id number]  Acct: [de-identified]   Admitting Diagnosis: Closed nondisp transvrs fracture of right patella with routine healing [S82.034D]  Closed nondisplaced transverse fracture of right patella with routine healing [S82.034D]  Date:  5/11/2021  Hospital Day: 2    Chief Complaint:  Right wrist pain    Subjective    5/15/2021: Patient is resting comfortable. Denies any chest pain or shortness of breath. Hemodynamically stable. 5/14/21: Sitting up in chair and in no acute distress. Status post close reduction internal fixation of right distal radius fracture on 5/13/2021. Patient has no pain complaints at this time. Denies chest pain or shortness of breath. Hemodynamically stable. Blood pressure elevated on serial vitals most recently 150/93 this morning but acceptable range given advanced age. Developed CHIARA today with creatinine of 1.54 and GFR low at 31.4 compared to 1.05 and 48.9 yesterday. Echocardiogram completed preoperatively on 5/12/2021 revealed normal LV systolic function with EF of 50%, no significant valvular heart disease, mild TR with RVSP of 13 mmHg. 5/12/21: This is a very pleasant 51-year-old  female with past medical history significant for coronary artery disease status post remote PCI of the LAD and RCA, history of ischemic cardiomyopathy with last EF of 40% per echo in 2016, hypertension, dyslipidemia, diabetes, history of sick sinus syndrome/complete heart block status post pacemaker implant in 2016 multiple medical problems who presented to the emergency room yesterday evening with complaints of fall at home. Patient reports gardening yesterday at which time she tripped and fell landing on her right hand and knee. She denied any chest pain, shortness of breath, palpitations, dizziness, lightheadedness or syncope. Fall was mechanical in nature.   Due to subsequent right wrist and knee pain she was brought to the ER for further evaluation.     On presentation to the emergency room, blood pressure 130/69, heart rate 105, respiratory rate 16, pulse ox 96%, temperature 97.5 °F.  Sodium 134, potassium 3.6, chloride 95, total CO2 29, BUN elevated 25, creatinine elevated 1.23, GFR low at 40.7, glucose 94. WBC 7.4, hemoglobin 10.8, hematocrit 32.2, platelets 464. Rapid Covid testing negative. Right wrist x-ray showed a complex distal radius fracture with intra-articular extension, ulnar styloid fracture. Right knee x-ray showed acute transverse nondisplaced fracture involving the patella and associated lipohemarthrosis. CT head showed no acute intracranial process. She was admitted for further evaluation.     At time evaluation today, patient is seen on 2 N. resting comfortably and in no distress. She is extremely hard of hearing. Denies chest pain, shortness of breath, palpitations, dizziness, lightheadedness or syncope. Orthopedic surgery is planning surgical intervention of right distal radius fracture tomorrow once clearance obtained. Patient is known to have a history of CAD status post remote PCI with last documented intervention in 2010/2011. Known to have an ischemic cardiomyopathy with EF of 40% per echo in 2016. Repeat echocardiogram pending today. Currently on telemetry, she is sinus rhythm with V pacing with heart rate in the 80s. Last pacemaker interrogation from 4/27/2021 was reviewed and unremarkable. Review of Systems:   Review of Systems   Constitutional: Negative for activity change and fever. HENT: Negative for congestion. Respiratory: Negative for chest tightness and shortness of breath. Cardiovascular: Negative for chest pain, palpitations and leg swelling. Gastrointestinal: Negative for nausea. Genitourinary: Negative for difficulty urinating. Skin: Negative for color change. Neurological: Negative for dizziness and syncope. Psychiatric/Behavioral: Negative for agitation and behavioral problems. Physical Examination:    BP (!) 146/81   Pulse 88   Temp 98.8 °F (37.1 °C) (Oral)   Resp 18   Ht 5' 6\" (1.676 m)   Wt 100 lb (45.4 kg)   SpO2 98%   BMI 16.14 kg/m²    Physical Exam  Constitutional:       General: She is not in acute distress. Appearance: Normal appearance. HENT:      Head: Normocephalic and atraumatic. Cardiovascular:      Rate and Rhythm: Normal rate and regular rhythm. Pulmonary:      Effort: Pulmonary effort is normal. No respiratory distress. Breath sounds: No wheezing, rhonchi or rales. Abdominal:      Palpations: Abdomen is soft. Tenderness: There is no abdominal tenderness. Musculoskeletal:      Cervical back: Normal range of motion and neck supple. Right lower leg: No edema. Left lower leg: No edema. Comments: Right UE in cast; ecchymosis of right hand    Skin:     General: Skin is warm and dry. Neurological:      General: No focal deficit present. Mental Status: She is alert and oriented to person, place, and time. Cranial Nerves: No cranial nerve deficit.    Psychiatric:         Mood and Affect: Mood normal.         Behavior: Behavior normal.         LABS:  CBC:   Lab Results   Component Value Date    WBC 9.3 05/14/2021    RBC 3.42 05/14/2021    HGB 10.4 05/14/2021    HCT 31.2 05/14/2021    MCV 91.2 05/14/2021    MCH 30.5 05/14/2021    MCHC 33.5 05/14/2021    RDW 13.2 05/14/2021     05/14/2021    MPV 7.5 01/31/2014     CBC with Differential:   Lab Results   Component Value Date    WBC 9.3 05/14/2021    RBC 3.42 05/14/2021    HGB 10.4 05/14/2021    HCT 31.2 05/14/2021     05/14/2021    MCV 91.2 05/14/2021    MCH 30.5 05/14/2021    MCHC 33.5 05/14/2021    RDW 13.2 05/14/2021    BANDSPCT 1 04/20/2021    LYMPHOPCT 12.1 05/14/2021    MONOPCT 6.5 05/14/2021    BASOPCT 0.9 05/14/2021    MONOSABS 0.6 05/14/2021    LYMPHSABS 1.1 05/14/2021 stenosis. No evidence of aortic valve regurgitation . No evidence of aortic valve stenosis. There is mild tricuspid regurgitation with estimated RVSP of 13 mm Hg. The left atrium is Mild to moderate dilated. Signature      ----------------------------------------------------------------   Electronically signed by Clarisa Cook DO(Interpreting   physician) on 05/12/2021 02:17 PM       EKG 5/12/21: V-paced 71, T wave inversion leads I and aVL, QTc 510ms     Telemetry 5/14/21: V-pacing/SR 80s-90s      Assessment:    Active Hospital Problems    Diagnosis Date Noted    CAD (coronary artery disease) [I25.10] 07/17/2018     Priority: High    Closed nondisplaced transverse fracture of right patella with routine healing [S82.034D] 05/13/2021     Priority: Low    Closed fracture of right distal radius and ulna [S52.501A, S52.601A]      Priority: Low    Closed nondisp transvrs fracture of right patella with routine healing [S82.034D] 05/11/2021     Priority: Low    Diabetes mellitus ([facility] [address] [E11.9]      Priority: Low    Pacemaker [Z95.0]      Priority: Low    Cardiomyopathy ([facility] [address] [I42.9]      Priority: Low    Pure hypercholesterolemia [E78.00] 07/17/2018     Priority: Low    Hypertension [I10] 07/17/2018     Priority: Low     1. Complex distal radius fracture secondary to fall s/o closed reduction internal fixation on 5/13/21  2. Acute nondisplaced patella fracture s/p mechanical fall--conservative therapy with knee immobilizer  3. Hx CAD s/p remote PCI of LAD and RCA in 2010/2011  4. Hx cardiomyopathy EF 40% per echo 7/12/16 at [facility]  5. Hx SSS/complete HB s/p MRI compatible PPM implant 5/31/16 with Dr. Noelle Pascal  6. ? Hx A-fib  7. HTN  8. DM   9. CHIARA     Plan:  1.  Maximize medical therapy-Coreg 3.125 mg p.o. twice daily, Lipitor 10 mg p.o. daily, insulin as directed, magnesium oxide 400 mg p.o. daily, DVT prophylaxis with Lovenox 40 mg subcu daily, avoid nephrotoxic agents secondary to mildly worsening renal

## 2021-05-15 NOTE — DISCHARGE INSTR - ACTIVITY
Patient may be up with nursing/PT with platform walker. NWB RUE. May perform ROM with right shoulder, elbow, and fingers  WBAT RLE with knee immobilizer. Knee immobilizer on at all times except during hygeine. No bending of the right knee at all. Fall safely precautions required.

## 2021-05-15 NOTE — FLOWSHEET NOTE
1221: AM assessment complete. Vital signs obtained. Patient c/o in her right UE and right LE. Patient has cast to RUE, and immobilizer in place to RLE. Noted bruises to RUE. Placed right arm on pillow with ice prn. Patient is Hard of hearing and impulsive. Instructed patient on using call light for assistance before getting up alone. 1400: Patients daughter on floor.  She is aware that her mothers discharge is pending for today to [facility] point via ambulance at approximately 4 pm.

## 2021-05-15 NOTE — CARE COORDINATION
SPOKE WITH CONNIE AT [facility]. HE TO RECEIVE PT AT ROOM #310 [facility]ST. LIFE CARE SET UP FOR 1600 TODAY. RN AWARE. FAMILY AWARE.

## 2021-05-15 NOTE — PROGRESS NOTES
Progress Note  Date:5/15/2021       [facility]  Patient Name:Cordelia Reilly     Date of Birth:[birth year]     Age:89 y.o. Subjective    Subjective:  Symptoms:  No shortness of breath, malaise, cough, chest pain, weakness, headache, chest pressure, anorexia, diarrhea or anxiety. Diet:  No nausea or vomiting. Review of Systems   Respiratory: Negative for cough and shortness of breath. Cardiovascular: Negative for chest pain. Gastrointestinal: Negative for anorexia, diarrhea, nausea and vomiting. Neurological: Negative for weakness. Objective         Vitals Last 24 Hours:  TEMPERATURE:  Temp  Av.9 °F (36.6 °C)  Min: 97.1 °F (36.2 °C)  Max: 98.6 °F (37 °C)  RESPIRATIONS RANGE: Resp  Av  Min: 16  Max: 20  PULSE OXIMETRY RANGE: SpO2  Av.5 %  Min: 97 %  Max: 98 %  PULSE RANGE: Pulse  Av  Min: 85  Max: 87  BLOOD PRESSURE RANGE: Systolic (38EDF), LW , Min:134 , NXR:753   ; Diastolic (13PRA), FGM:39, Min:62, Max:87    I/O (24Hr): Intake/Output Summary (Last 24 hours) at 5/15/2021 1110  Last data filed at 5/15/2021 0700  Gross per 24 hour   Intake 2772.5 ml   Output --   Net 2772.5 ml     Objective:  General Appearance:  Comfortable, well-appearing and in no acute distress. Vital signs: (most recent): Blood pressure (!) 143/62, pulse 85, temperature 98.6 °F (37 °C), temperature source Oral, resp. rate 16, height 5' 6\" (1.676 m), weight 100 lb (45.4 kg), SpO2 97 %, currently breastfeeding. HEENT: Normal HEENT exam.    Lungs:  Normal effort. Heart: Normal rate. Regular rhythm. S1 normal and S2 normal.    Abdomen: Abdomen is soft. Bowel sounds are normal.   There is no epigastric area or suprapubic area tenderness. Pulses: Distal pulses are intact. Neurological: Patient is alert. Pupils:  Pupils are equal, round, and reactive to light. Skin:  Warm.       Labs/Imaging/Diagnostics    Labs:  CBC:  Recent Labs     21  0545   WBC 9.3   RBC 3.42* HGB 10.4*   HCT 31.2*   MCV 91.2   RDW 13.2        CHEMISTRIES:  Recent Labs     05/13/21  1101 05/14/21  0545 05/15/21  0559    135 137   K 4.0 4.1  4.1 3.7   CL 95 97 101   CO2 29 24 25   BUN 19 30* 37*   CREATININE 1.05* 1.54* 1.13*   GLUCOSE 265* 330* 67*     PT/INR:  No results for input(s): PROTIME, INR in the last 72 hours. APTT:  No results for input(s): APTT in the last 72 hours. LIVER PROFILE:  No results for input(s): AST, ALT, BILIDIR, BILITOT, ALKPHOS in the last 72 hours. Imaging Last 24 Hours:  Echo Complete 2d W Doppler W Color    Result Date: 5/12/2021  Transthoracic Echocardiography Report (TTE)  Demographics   Patient Name    Nelson Chinchilla Gender               Female   Patient Number  [id number]       Race                                                   Ethnicity   Visit Number    [id number]      Room Number          N221   Corporate ID                   Date of Study        05/12/2021   Accession       [id number]     Referring Physician  Kimberly Peña  Number   Date of Birth   05/04/1928     Sonographer          Harriett Rivers   Age             80 year(s)     Interpreting         [facility])                                 Physician            Cardiology                                                      Ruth Ferraro., DO  Procedure Type of Study   TTE procedure:ECHO COMPLETE 2D W/DOP W/COLOR. Procedure Date Date: 05/12/2021 Start: 11:17 AM Study Location: Portable Technical Quality: Adequate visualization Indications:Chest pain. Patient Status: Routine Height: 66 inches Weight: 100 pounds BSA: 1.49 m^2 BMI: 16.14 kg/m^2 BP: 119/55 mmHg  Conclusions   Summary  Normal left ventricular systolic function, no regional wall motion  abnormalities, estimated ejection fraction of 50%. Normal left ventricular size and function. Normal left ventricular wall thickness. No evidence of mitral regurgitation. No evidence of mitral valve stenosis.   No evidence of aortic mmHg   Pulmonic Valve            Estimated PASP: 18.96 mmHg  Structures  Right Atrium   RA Systolic Pressure: 5 mmHg   Right Ventricle            RV Systolic Pressure: 95.35 mmHg      Xr Wrist Right (min 3 Views)    Result Date: 5/12/2021  EXAMINATION:  XR WRIST RIGHT (MIN 3 VIEWS), XR HAND RIGHT (MIN 3 VIEWS) HISTORY:  Fall with wrist deformity. TECHNIQUE:  XR WRIST RIGHT (MIN 3 VIEWS), XR HAND RIGHT (MIN 3 VIEWS) COMPARISON: None RESULT: Right wrist/hand: Impacted comminuted mildly displaced mildly angled fracture involving the distal radius with apparent intra-articular extension. There also appears to be mildly displaced fracture involving the ulnar styloid. Associated soft tissue swelling. Remote healed fracture involving the fifth metacarpal. Osteopenia. Scattered degenerative changes. Vascular calcifications. No other significant abnormality. Complex distal radius fracture with intra-articular extension. Ulnar styloid fracture. Xr Hand Right (min 3 Views)    Result Date: 5/12/2021  EXAMINATION:  XR WRIST RIGHT (MIN 3 VIEWS), XR HAND RIGHT (MIN 3 VIEWS) HISTORY:  Fall with wrist deformity. TECHNIQUE:  XR WRIST RIGHT (MIN 3 VIEWS), XR HAND RIGHT (MIN 3 VIEWS) COMPARISON: None RESULT: Right wrist/hand: Impacted comminuted mildly displaced mildly angled fracture involving the distal radius with apparent intra-articular extension. There also appears to be mildly displaced fracture involving the ulnar styloid. Associated soft tissue swelling. Remote healed fracture involving the fifth metacarpal. Osteopenia. Scattered degenerative changes. Vascular calcifications. No other significant abnormality. Complex distal radius fracture with intra-articular extension. Ulnar styloid fracture. Xr Knee Right (3 Views)    Result Date: 5/12/2021  EXAMINATION:  XR KNEE RIGHT (3 VIEWS) HISTORY:  Fall, landed on the right knee and hand.  TECHNIQUE:  XR KNEE RIGHT (3 VIEWS) COMPARISON: None RESULT: Acute transverse nondisplaced fracture involving the mid to lower pole of the patella. Associated moderate lipohemarthrosis. No other distinct acute fractures about the knee. Underlying osteopenia or osteoporosis. Small osteophytes with grossly maintained joint spaces. Diffuse vascular calcifications. Superior patellar enthesophyte. No other significant abnormality. Acute transverse nondisplaced fracture involving the patella with associated lipohemarthrosis. Ct Head Wo Contrast    Result Date: 5/11/2021  EXAMINATION: CT of the brain without contrast HISTORY: Pain after a fall COMPARISON: None available TECHNIQUE: Multiple contiguous axial images were obtained of the brain from the skull base through the vertex. Multiplanar reformats were obtained. FINDINGS: Prominence of the sulci and ventricles compatible with moderate generalized parenchymal volume loss. Gray-white matter differentiation is preserved. Areas of bilateral supratentorial white matter hypoattenuation are nonspecific but most likely related to  chronic small vessel ischemic changes in a patient of this age. No acute hemorrhage or abnormal extra-axial fluid collection. No mass effect or midline shift. The visualized paranasal sinuses and mastoid air cells are clear. Calvarium is intact. No acute intracranial process. Generalized parenchymal volume loss and nonspecific white matter findings most compatible with chronic small vessel ischemic changes in a patient of this age. All CT scans at this facility use dose modulation, iterative reconstruction, and/or weight based dosing when appropriate to reduce radiation dose to as low as reasonably achievable. Ct Facial Bones Wo Contrast    Result Date: 5/11/2021  EXAM: CT FACIAL BONES WO CONTRAST HISTORY: Pain after a fall TECHNIQUE: Multiple contiguous axial images were obtained of the facial bones without contrast. Multiplanar reformats were obtained.  COMPARISON: None available FINDINGS: No acute facial bone fracture. Orbital rims are intact. Orbital contents are within normal limits. The paranasal sinuses are clear. Visualized mastoid air cells are clear. Facial soft tissues are within normal limits. Degenerative changes of the visualized cervical spine. No acute facial bone fracture. All CT scans at this facility use dose modulation, iterative reconstruction, and/or weight based dosing when appropriate to reduce radiation dose to as low as reasonably achievable. Ct Knee Right Wo Contrast    Result Date: 5/12/2021  CT KNEE RIGHT WO CONTRAST HISTORY:  s/p mechanical fall with patellar fracture. Evaluation of tibial plateau TECHNIQUE: Routine CT of the right knee without contrast Contrast: None. All CT scans at this facility use dose modulation, iterative reconstruction, and/or weight based dosing when appropriate to reduce radiation dose to as low as reasonably achievable. COMPARISON: Right knee radiographs 5/11/2021. RESULT: Acute transverse nondisplaced fracture involving the mid to lower pole of the patella. Associated moderate lipohemarthrosis. No other distinct acute fractures about the knee. Underlying osteopenia or osteoporosis. Small osteophytes with grossly maintained joint spaces. Diffuse vascular calcifications. Superior patellar enthesophyte. Soft tissue swelling anteriorly. Muscle bulk grossly maintained. Acute nondisplaced fracture involving the patella with associated moderate lipohemarthrosis. . No other distinct fractures.     Assessment//Plan           Hospital Problems         Last Modified POA    * (Principal) Closed nondisp transvrs fracture of right patella with routine healing 5/11/2021 Yes    CAD (coronary artery disease) 5/11/2021 Yes    Hypertension 5/11/2021 Yes    Pure hypercholesterolemia 5/11/2021 Yes    Diabetes mellitus ([address] 5/11/2021 Yes    Pacemaker 5/11/2021 Yes    Cardiomyopathy ([address] 5/11/2021 Yes    Closed fracture of right distal radius and ulna 5/12/2021 Yes    Closed nondisplaced transverse fracture of right patella with routine healing 5/13/2021 Yes      radius, patella fracture  Past Medical History:   Diagnosis Date    Anemia     Atrial fibrillation (HCC)     CAD (coronary artery disease)     Cardiomyopathy ([facility] [address]     CHF (congestive heart failure) ([facility] [address]     Diabetes mellitus ([facility] [address]     Hypertension        Assessment & Plan    5/13: Going for surgery today, no overnight events, no new complaitns.,  Hold nephrotoxic agents for now for surgery. Continue with SSI coverage. A. fib is rate controlled. HTN is stable  5/14: Noted acute kidney injury and hyperglycemia. Add Lantus twice daily for better control and Januvia. Repeat renal function tomorrow. Hold nephrotoxic agent. Continue with PT OT.  5/15: Acute kidney injury improving. Patient can be discharged and follow-up with PCP as outpatient. Patient feeling better.   Electronically signed by Chacha Layton MD on 5/13/21 at 10:24 AM EDT

## 2021-05-15 NOTE — DISCHARGE INSTR - DIET

## 2021-05-17 NOTE — PROGRESS NOTES
Physical Therapy  Facility/Department: [facility] N221/N221-01  Physical Therapy Discharge      NAME: Rony Haque    : 1928 (80 y.o.)  MRN: [id number]    Account: [de-identified]  Gender: female      Patient has been discharged from acute care hospital. DC patient from current PT program.      Electronically signed by Keo Armenta PT on 21 at 8:11 AM EDT

## 2021-05-17 NOTE — CARE COORDINATION
[address] [place] [address] Update     2021    Patient: Wade South Patient : 1928   MRN: [id number]  Reason for Admission: 2021 - 5/15/2021 s/p Patti PINYUAN  Discharge Date: 5/15/21 RARS: Readmission Risk Score: 19  CT       Care Transitions Post Acute Facility Update    Care Transitions Interventions  Post Acute Facility: [address] [place] [address] Update

## 2021-08-09 ENCOUNTER — APPOINTMENT (OUTPATIENT)
Dept: GENERAL RADIOLOGY | Age: 86
DRG: 638 | End: 2021-08-09
Payer: MEDICARE

## 2021-08-09 ENCOUNTER — HOSPITAL ENCOUNTER (INPATIENT)
Age: 86
LOS: 4 days | Discharge: HOME HEALTH | DRG: 638 | End: 2021-08-13
Attending: EMERGENCY MEDICINE | Admitting: INTERNAL MEDICINE
Payer: MEDICARE

## 2021-08-09 DIAGNOSIS — R77.8 ELEVATED TROPONIN: ICD-10-CM

## 2021-08-09 DIAGNOSIS — R73.9 HYPERGLYCEMIA: Primary | ICD-10-CM

## 2021-08-09 DIAGNOSIS — N39.0 URINARY TRACT INFECTION WITHOUT HEMATURIA, SITE UNSPECIFIED: ICD-10-CM

## 2021-08-09 DIAGNOSIS — R53.1 GENERAL WEAKNESS: ICD-10-CM

## 2021-08-09 PROBLEM — E11.00 TYPE 2 DIABETES MELLITUS WITH HYPEROSMOLAR HYPERGLYCEMIC STATE (HHS) (HCC): Status: ACTIVE | Noted: 2021-08-09

## 2021-08-09 LAB
ALBUMIN SERPL-MCNC: 4.1 G/DL (ref 3.5–4.6)
ALP BLD-CCNC: 163 U/L (ref 40–130)
ALT SERPL-CCNC: 13 U/L (ref 0–33)
ANION GAP SERPL CALCULATED.3IONS-SCNC: 14 MEQ/L (ref 9–15)
AST SERPL-CCNC: 18 U/L (ref 0–35)
BACTERIA: ABNORMAL /HPF
BASOPHILS ABSOLUTE: 0 K/UL (ref 0–0.1)
BASOPHILS RELATIVE PERCENT: 0.3 % (ref 0.1–1.2)
BETA-HYDROXYBUTYRATE: 5 MG/DL (ref 0.2–2.8)
BILIRUB SERPL-MCNC: 0.3 MG/DL (ref 0.2–0.7)
BILIRUBIN URINE: NEGATIVE
BLOOD, URINE: ABNORMAL
BUN BLDV-MCNC: 32 MG/DL (ref 8–23)
CALCIUM SERPL-MCNC: 9.6 MG/DL (ref 8.5–9.9)
CHLORIDE BLD-SCNC: 92 MEQ/L (ref 95–107)
CHP ED QC CHECK: YES
CLARITY: CLEAR
CO2: 23 MEQ/L (ref 20–31)
COLOR: YELLOW
CREAT SERPL-MCNC: 1.56 MG/DL (ref 0.5–0.9)
EOSINOPHILS ABSOLUTE: 0.1 K/UL (ref 0–0.4)
EOSINOPHILS RELATIVE PERCENT: 0.9 % (ref 0.7–5.8)
EPITHELIAL CELLS, UA: ABNORMAL /HPF
GFR AFRICAN AMERICAN: 37.5
GFR NON-AFRICAN AMERICAN: 31
GLOBULIN: 3.6 G/DL (ref 2.3–3.5)
GLUCOSE BLD-MCNC: 383 MG/DL (ref 60–115)
GLUCOSE BLD-MCNC: 425 MG/DL
GLUCOSE BLD-MCNC: 425 MG/DL (ref 60–115)
GLUCOSE BLD-MCNC: 840 MG/DL (ref 70–99)
GLUCOSE BLD-MCNC: >600 MG/DL (ref 60–115)
GLUCOSE BLD-MCNC: >600 MG/DL (ref 60–115)
GLUCOSE URINE: 500 MG/DL
HCT VFR BLD CALC: 38.8 % (ref 37–47)
HEMOGLOBIN: 12.4 G/DL (ref 11.2–15.7)
IMMATURE GRANULOCYTES #: 0 K/UL
IMMATURE GRANULOCYTES %: 0.3 %
KETONES, URINE: NEGATIVE MG/DL
LEUKOCYTE ESTERASE, URINE: ABNORMAL
LYMPHOCYTES ABSOLUTE: 1.5 K/UL (ref 1.2–3.7)
LYMPHOCYTES RELATIVE PERCENT: 24.2 %
MCH RBC QN AUTO: 28.6 PG (ref 25.6–32.2)
MCHC RBC AUTO-ENTMCNC: 32 % (ref 32.2–35.5)
MCV RBC AUTO: 89.6 FL (ref 79.4–94.8)
MONOCYTES ABSOLUTE: 0.6 K/UL (ref 0.2–0.9)
MONOCYTES RELATIVE PERCENT: 9.1 % (ref 4.7–12.5)
NEUTROPHILS ABSOLUTE: 4.2 K/UL (ref 1.6–6.1)
NEUTROPHILS RELATIVE PERCENT: 65.2 % (ref 34–71.1)
NITRITE, URINE: NEGATIVE
PDW BLD-RTO: 13.8 % (ref 11.7–14.4)
PERFORMED ON: ABNORMAL
PH UA: 7 (ref 5–9)
PLATELET # BLD: 175 K/UL (ref 182–369)
POTASSIUM SERPL-SCNC: 5.1 MEQ/L (ref 3.4–4.9)
PROTEIN UA: NEGATIVE MG/DL
RBC # BLD: 4.33 M/UL (ref 3.93–5.22)
RBC UA: ABNORMAL /HPF (ref 0–2)
SODIUM BLD-SCNC: 129 MEQ/L (ref 135–144)
SPECIFIC GRAVITY UA: 1.01 (ref 1–1.03)
TOTAL PROTEIN: 7.7 G/DL (ref 6.3–8)
TROPONIN: 0.01 NG/ML (ref 0–0.01)
URINE REFLEX TO CULTURE: YES
UROBILINOGEN, URINE: 0.2 E.U./DL
WBC # BLD: 6.4 K/UL (ref 4–10)
WBC UA: ABNORMAL /HPF (ref 0–5)

## 2021-08-09 PROCEDURE — 85025 COMPLETE CBC W/AUTO DIFF WBC: CPT

## 2021-08-09 PROCEDURE — 2580000003 HC RX 258: Performed by: EMERGENCY MEDICINE

## 2021-08-09 PROCEDURE — 93005 ELECTROCARDIOGRAM TRACING: CPT

## 2021-08-09 PROCEDURE — 81001 URINALYSIS AUTO W/SCOPE: CPT

## 2021-08-09 PROCEDURE — 1210000000 HC MED SURG R&B

## 2021-08-09 PROCEDURE — 02HV33Z INSERTION OF INFUSION DEVICE INTO SUPERIOR VENA CAVA, PERCUTANEOUS APPROACH: ICD-10-PCS | Performed by: INTERNAL MEDICINE

## 2021-08-09 PROCEDURE — 80053 COMPREHEN METABOLIC PANEL: CPT

## 2021-08-09 PROCEDURE — 87077 CULTURE AEROBIC IDENTIFY: CPT

## 2021-08-09 PROCEDURE — 6370000000 HC RX 637 (ALT 250 FOR IP): Performed by: EMERGENCY MEDICINE

## 2021-08-09 PROCEDURE — 87186 SC STD MICRODIL/AGAR DIL: CPT

## 2021-08-09 PROCEDURE — 6360000002 HC RX W HCPCS: Performed by: EMERGENCY MEDICINE

## 2021-08-09 PROCEDURE — 99283 EMERGENCY DEPT VISIT LOW MDM: CPT

## 2021-08-09 PROCEDURE — 87086 URINE CULTURE/COLONY COUNT: CPT

## 2021-08-09 PROCEDURE — 6370000000 HC RX 637 (ALT 250 FOR IP): Performed by: INTERNAL MEDICINE

## 2021-08-09 PROCEDURE — 82010 KETONE BODYS QUAN: CPT

## 2021-08-09 PROCEDURE — 71045 X-RAY EXAM CHEST 1 VIEW: CPT

## 2021-08-09 PROCEDURE — 96374 THER/PROPH/DIAG INJ IV PUSH: CPT

## 2021-08-09 PROCEDURE — 84484 ASSAY OF TROPONIN QUANT: CPT

## 2021-08-09 PROCEDURE — 96361 HYDRATE IV INFUSION ADD-ON: CPT

## 2021-08-09 PROCEDURE — 2580000003 HC RX 258: Performed by: INTERNAL MEDICINE

## 2021-08-09 RX ORDER — SODIUM CHLORIDE 9 MG/ML
25 INJECTION, SOLUTION INTRAVENOUS PRN
Status: DISCONTINUED | OUTPATIENT
Start: 2021-08-09 | End: 2021-08-13 | Stop reason: HOSPADM

## 2021-08-09 RX ORDER — SODIUM CHLORIDE 0.9 % (FLUSH) 0.9 %
3 SYRINGE (ML) INJECTION EVERY 8 HOURS
Status: DISCONTINUED | OUTPATIENT
Start: 2021-08-09 | End: 2021-08-10

## 2021-08-09 RX ORDER — PROMETHAZINE HYDROCHLORIDE 12.5 MG/1
12.5 TABLET ORAL EVERY 6 HOURS PRN
Status: DISCONTINUED | OUTPATIENT
Start: 2021-08-09 | End: 2021-08-13 | Stop reason: HOSPADM

## 2021-08-09 RX ORDER — POLYETHYLENE GLYCOL 3350 17 G/17G
17 POWDER, FOR SOLUTION ORAL DAILY PRN
Status: DISCONTINUED | OUTPATIENT
Start: 2021-08-09 | End: 2021-08-13 | Stop reason: HOSPADM

## 2021-08-09 RX ORDER — INSULIN GLARGINE 100 [IU]/ML
20 INJECTION, SOLUTION SUBCUTANEOUS NIGHTLY
Status: DISCONTINUED | OUTPATIENT
Start: 2021-08-09 | End: 2021-08-10

## 2021-08-09 RX ORDER — ASPIRIN 81 MG/1
81 TABLET ORAL DAILY
Status: DISCONTINUED | OUTPATIENT
Start: 2021-08-10 | End: 2021-08-13 | Stop reason: HOSPADM

## 2021-08-09 RX ORDER — CARVEDILOL 3.12 MG/1
3.12 TABLET ORAL 2 TIMES DAILY WITH MEALS
Status: DISCONTINUED | OUTPATIENT
Start: 2021-08-10 | End: 2021-08-13 | Stop reason: HOSPADM

## 2021-08-09 RX ORDER — NICOTINE POLACRILEX 4 MG
15 LOZENGE BUCCAL PRN
Status: DISCONTINUED | OUTPATIENT
Start: 2021-08-09 | End: 2021-08-13 | Stop reason: HOSPADM

## 2021-08-09 RX ORDER — ALOGLIPTIN 12.5 MG/1
6.25 TABLET, FILM COATED ORAL DAILY
Status: DISCONTINUED | OUTPATIENT
Start: 2021-08-10 | End: 2021-08-13 | Stop reason: HOSPADM

## 2021-08-09 RX ORDER — ACETAMINOPHEN 650 MG/1
650 SUPPOSITORY RECTAL EVERY 6 HOURS PRN
Status: DISCONTINUED | OUTPATIENT
Start: 2021-08-09 | End: 2021-08-13 | Stop reason: HOSPADM

## 2021-08-09 RX ORDER — SODIUM BICARBONATE 325 MG/1
650 TABLET ORAL 3 TIMES DAILY
Status: ON HOLD | COMMUNITY
End: 2021-08-13 | Stop reason: SDUPTHER

## 2021-08-09 RX ORDER — DEXTROSE MONOHYDRATE 50 MG/ML
100 INJECTION, SOLUTION INTRAVENOUS PRN
Status: DISCONTINUED | OUTPATIENT
Start: 2021-08-09 | End: 2021-08-13 | Stop reason: HOSPADM

## 2021-08-09 RX ORDER — POTASSIUM CHLORIDE 750 MG/1
20 TABLET, EXTENDED RELEASE ORAL DAILY
Status: ON HOLD | COMMUNITY
End: 2021-08-13 | Stop reason: SDUPTHER

## 2021-08-09 RX ORDER — PAROXETINE HYDROCHLORIDE 20 MG/1
20 TABLET, FILM COATED ORAL NIGHTLY
Status: DISCONTINUED | OUTPATIENT
Start: 2021-08-09 | End: 2021-08-13 | Stop reason: HOSPADM

## 2021-08-09 RX ORDER — ACETAMINOPHEN 325 MG/1
650 TABLET ORAL EVERY 6 HOURS PRN
Status: DISCONTINUED | OUTPATIENT
Start: 2021-08-09 | End: 2021-08-13 | Stop reason: HOSPADM

## 2021-08-09 RX ORDER — ONDANSETRON 2 MG/ML
4 INJECTION INTRAMUSCULAR; INTRAVENOUS EVERY 6 HOURS PRN
Status: DISCONTINUED | OUTPATIENT
Start: 2021-08-09 | End: 2021-08-13 | Stop reason: HOSPADM

## 2021-08-09 RX ORDER — MIRTAZAPINE 15 MG/1
7.5 TABLET, FILM COATED ORAL NIGHTLY
COMMUNITY
End: 2022-04-11 | Stop reason: SINTOL

## 2021-08-09 RX ORDER — SODIUM CHLORIDE 0.9 % (FLUSH) 0.9 %
10 SYRINGE (ML) INJECTION PRN
Status: DISCONTINUED | OUTPATIENT
Start: 2021-08-09 | End: 2021-08-13 | Stop reason: HOSPADM

## 2021-08-09 RX ORDER — DEXTROSE MONOHYDRATE 25 G/50ML
12.5 INJECTION, SOLUTION INTRAVENOUS PRN
Status: DISCONTINUED | OUTPATIENT
Start: 2021-08-09 | End: 2021-08-13 | Stop reason: HOSPADM

## 2021-08-09 RX ORDER — DONEPEZIL HYDROCHLORIDE 5 MG/1
5 TABLET, FILM COATED ORAL NIGHTLY
Status: DISCONTINUED | OUTPATIENT
Start: 2021-08-09 | End: 2021-08-13 | Stop reason: HOSPADM

## 2021-08-09 RX ORDER — SODIUM CHLORIDE 9 MG/ML
INJECTION, SOLUTION INTRAVENOUS CONTINUOUS
Status: DISCONTINUED | OUTPATIENT
Start: 2021-08-09 | End: 2021-08-10

## 2021-08-09 RX ORDER — 0.9 % SODIUM CHLORIDE 0.9 %
500 INTRAVENOUS SOLUTION INTRAVENOUS ONCE
Status: COMPLETED | OUTPATIENT
Start: 2021-08-09 | End: 2021-08-09

## 2021-08-09 RX ORDER — EZETIMIBE 10 MG/1
10 TABLET ORAL DAILY
Status: DISCONTINUED | OUTPATIENT
Start: 2021-08-10 | End: 2021-08-13 | Stop reason: HOSPADM

## 2021-08-09 RX ORDER — GABAPENTIN 100 MG/1
100 CAPSULE ORAL 2 TIMES DAILY
Status: DISCONTINUED | OUTPATIENT
Start: 2021-08-09 | End: 2021-08-13 | Stop reason: HOSPADM

## 2021-08-09 RX ORDER — SODIUM CHLORIDE 0.9 % (FLUSH) 0.9 %
10 SYRINGE (ML) INJECTION EVERY 12 HOURS SCHEDULED
Status: DISCONTINUED | OUTPATIENT
Start: 2021-08-09 | End: 2021-08-13 | Stop reason: HOSPADM

## 2021-08-09 RX ADMIN — DONEPEZIL HYDROCHLORIDE 5 MG: 5 TABLET, FILM COATED ORAL at 21:33

## 2021-08-09 RX ADMIN — SODIUM CHLORIDE: 9 INJECTION, SOLUTION INTRAVENOUS at 21:33

## 2021-08-09 RX ADMIN — CEFTRIAXONE 1000 MG: 1 INJECTION, POWDER, FOR SOLUTION INTRAMUSCULAR; INTRAVENOUS at 19:52

## 2021-08-09 RX ADMIN — Medication 3 ML: at 19:52

## 2021-08-09 RX ADMIN — INSULIN HUMAN 7 UNITS: 100 INJECTION, SOLUTION PARENTERAL at 18:14

## 2021-08-09 RX ADMIN — INSULIN GLARGINE 20 UNITS: 100 INJECTION, SOLUTION SUBCUTANEOUS at 21:33

## 2021-08-09 RX ADMIN — SODIUM CHLORIDE 500 ML: 9 INJECTION, SOLUTION INTRAVENOUS at 18:53

## 2021-08-09 RX ADMIN — SODIUM CHLORIDE 500 ML: 9 INJECTION, SOLUTION INTRAVENOUS at 19:53

## 2021-08-09 ASSESSMENT — ENCOUNTER SYMPTOMS
SINUS PAIN: 0
NAUSEA: 0
SORE THROAT: 0
DIARRHEA: 0
VOMITING: 0
ABDOMINAL PAIN: 0
COUGH: 0
BACK PAIN: 0
EYE REDNESS: 0
SHORTNESS OF BREATH: 0
COLOR CHANGE: 0

## 2021-08-09 ASSESSMENT — PAIN SCALES - GENERAL: PAINLEVEL_OUTOF10: 0

## 2021-08-09 NOTE — ED NOTES
PO water provided and UA specimen encouraged. Family member at bedside.      Marlene Castro RN  08/09/21 [id number]

## 2021-08-09 NOTE — ED NOTES
Critical value troponin 0.012 called from lab and provided to Doctor.      Dwight Jimenez, VICTOR MANUEL  08/09/21 [id number]

## 2021-08-09 NOTE — ED NOTES
Informed nursing supervisor patient is ready to be taken to the floor.       Roopa Horat  08/09/21 [id number]

## 2021-08-09 NOTE — ED PROVIDER NOTES
[address] [place] [address] ED  EMERGENCY DEPARTMENT ENCOUNTER      Pt Name: Cain Johnson  MRN: [id number]  [place] 5/4/1928  Date of evaluation: 8/9/2021  Provider: Luke Francisco DO    CHIEF COMPLAINT       Chief Complaint   Patient presents with    Hyperglycemia     x3 hours     Chief complaint: My sugar was high  History of chief complaint: This 70-year-old female presents the emergency department along with her daughter complaining of high blood sugar. Patient states that she has felt some generalized weakness and just tired the last couple days but did not know her sugar was that high. Patient is a diabetic daughter states she is on Januvia and Lantus at night but then she has been forgetting to take her insulin at nighttime. Daughter states she is planning to hire an aide to come in to help her in the evening for couple hours to make sure she takes her medicine. Patient just was released from the nursing home after rehabilitation 12 days ago. Patient denies any chest pain palpitations or shortness of breath no abdominal pain nausea vomiting or diarrhea no fevers or chills. Patient states she has had a decreased appetite not eating or drinking well. Patient denies any dysuria hematuria frequency or urgency no associated back pain no focal numbness tingling or weakness to the extremities. Nursing Notes were reviewed. REVIEW OF SYSTEMS    (2-9 systems for level 4, 10 or more for level 5)     Review of Systems   Constitutional: Negative for chills, diaphoresis and fever. HENT: Negative for congestion, sinus pain and sore throat. Eyes: Negative for redness and visual disturbance. Respiratory: Negative for cough and shortness of breath. Cardiovascular: Negative for chest pain, palpitations and leg swelling. Gastrointestinal: Negative for abdominal pain, diarrhea, nausea and vomiting. Genitourinary: Negative for difficulty urinating, dysuria, flank pain and frequency.    Musculoskeletal: Negative for back pain and myalgias. Skin: Negative for color change and rash. Neurological: Positive for weakness. Negative for dizziness, numbness and headaches. Hematological: Negative for adenopathy. Except as noted above the remainder of the review of systems was reviewed and negative. PAST MEDICAL HISTORY     Past Medical History:   Diagnosis Date    Anemia     [facility]     CAD (coronary artery disease)     Cardiomyopathy ([facility]     CHF (congestive heart failure) (HCC)     Diabetes mellitus ([facility]     Hypertension          SURGICAL HISTORY       Past Surgical History:   Procedure Laterality Date    APPENDECTOMY      CORONARY ANGIOPLASTY WITH STENT PLACEMENT      FOREARM SURGERY Right 5/13/2021    CLOSED  REDUCTION INTERNAL FIXATION RIGHT  DISTAL RADIUS PINNING performed by Casa Christianson MD at [address] Left 12/2012    PACEMAKER INSERTION      TONSILLECTOMY      WRIST FRACTURE SURGERY Left 12/2012         CURRENT MEDICATIONS       Previous Medications    ASCORBIC ACID (VITAMIN C) 250 MG TABLET    Take 250 mg by mouth daily    ASPIRIN 81 MG EC TABLET    Take 81 mg by mouth daily    CARVEDILOL (COREG) 3.125 MG TABLET    3.125 mg 2 times daily     DONEPEZIL (ARICEPT) 5 MG TABLET    Take 5 mg by mouth nightly    EZETIMIBE (ZETIA) 10 MG TABLET    Take 10 mg by mouth daily     FERROUS SULFATE 325 (65 FE) MG TABLET    Take 325 mg by mouth daily (with breakfast)    GABAPENTIN (NEURONTIN) 100 MG CAPSULE    Take 100 mg by mouth 2 times daily.  One tab in AM and one tab at 3PM    INSULIN GLARGINE (LANTUS) 100 UNIT/ML INJECTION PEN    15 Units daily (with breakfast)     LOVASTATIN (MEVACOR) 20 MG TABLET    Take 20 mg by mouth nightly    MAGNESIUM 500 MG TABS    Take 500 mg by mouth daily     METFORMIN (GLUCOPHAGE) 1000 MG TABLET    Take 1,000 mg by mouth 2 times daily (with meals)     PAROXETINE (PAXIL) 20 MG TABLET    Take 20 mg by mouth nightly SITAGLIPTIN (JANUVIA) 100 MG TABLET    Take 100 mg by mouth daily     VITAMIN D 1000 UNITS CAPS    Take 1,000 Units by mouth daily        ALLERGIES     Tylenol [acetaminophen]    FAMILY HISTORY     History reviewed. No pertinent family history. SOCIAL HISTORY       Social History     Socioeconomic History    Marital status:      Spouse name: None    Number of children: None    Years of education: None    Highest education level: None   Occupational History    None   Tobacco Use    Smoking status: Former Smoker    Smokeless tobacco: Never Used   Vaping Use    Vaping Use: Never used   Substance and Sexual Activity    Alcohol use: Not Currently    Drug use: Never    Sexual activity: None   Other Topics Concern    None   Social History Narrative    None     Social Determinants of Health     Financial Resource Strain:     Difficulty of Paying Living Expenses:    Food Insecurity:     Worried About Running Out of Food in the Last Year:     Ran Out of Food in the Last Year:    Transportation Needs:     Lack of Transportation (Medical):      Lack of Transportation (Non-Medical):    Physical Activity:     Days of Exercise per Week:     Minutes of Exercise per Session:    Stress:     Feeling of Stress :    Social Connections:     Frequency of Communication with Friends and Family:     Frequency of Social Gatherings with Friends and Family:     Attends [employer] Services:     Active Member of Clubs or Organizations:     Attends Club or Organization Meetings:     Marital Status:    Intimate Partner Violence:     Fear of Current or Ex-Partner:     Emotionally Abused:     Physically Abused:     Sexually Abused:            PHYSICAL EXAM    (up to 7 for level 4, 8 or more for level 5)     ED Triage Vitals [08/09/21 1750]   BP Temp Temp Source Pulse Resp SpO2 Height Weight   (!) 154/79 97.6 °F (36.4 °C) Oral 106 16 97 % 5' 4\" (1.626 m) 96 lb 3.2 oz (43.6 kg)       Physical Exam   General appearance: Patient is awake alert interactive appropriate nontoxic in no acute distress, patient is hard of hearing. Patient well-appearing  Head is atraumatic normocephalic  Eyes pupils are equal and reactive sclera white conjunctive are pink  Oral pharyngeal cavity is pink with some decreased moisture, no exudates or ulcerations no asymmetry, the airway is widely patent  Neck: Supple no meningeal signs no adenopathy no JVD  Heart: Regular rate and rhythm no gross murmurs rubs or clicks  Lungs: Breath sounds are clear with good air movement throughout no active wheezes rales or rhonchi no respiratory distress  Abdomen: Soft nontender with good bowel sounds no rebound rigidity or guarding no firm or pulsatile masses, no gross distention, femoral pulses full and symmetric  Back: Nontender to palpation no costovertebral angle tenderness  Skin: Warm and dry without rashes  Lower extremities: No edema or calf tenderness or asymmetry. Neurologic: Patient is awake alert oriented to person place answering questions appropriately following commands patient is very hard of hearing pupils are equal and reactive extraocular muscles are intact facial symmetry is intact tongue is midline strength testing is grossly symmetric in the upper and lower extremities there is some restriction with the right arm still in a brace. Sensation is intact in all 4 extremities.     DIAGNOSTIC RESULTS     EKG: All EKG's are interpreted by the Emergency Department Physician who either signs or Co-signs this chart in the absence of a cardiologist.    EKG interpreted by ED physician indication weakness: Paced rhythm at 93 no further morphology interpretation secondary to the paced pattern    RADIOLOGY:   Non-plain film images such as CT, Ultrasound and MRI are read by the radiologist. Plain radiographic images are visualized and preliminarily interpreted by the emergency physician with the below findings:    Chest x-ray 1 view interpreted by ED physician indication weakness: Heart mediastinum are within normal limits the lung fields are clear there are no acute consolidations vascular congestion or pneumothorax appreciated.     Interpretation per the Radiologist below, if available at the time of this note:    XR CHEST PORTABLE    (Results Pending)       LABS:  Labs Reviewed   URINE RT REFLEX TO CULTURE - Abnormal; Notable for the following components:       Result Value    Glucose, Ur 500 (*)     All other components within normal limits   COMPREHENSIVE METABOLIC PANEL - Abnormal; Notable for the following components:    Sodium 129 (*)     Potassium 5.1 (*)     Chloride 92 (*)     Glucose 840 (*)     BUN 32 (*)     CREATININE 1.56 (*)     GFR Non- 31.0 (*)     GFR  37.5 (*)     Alkaline Phosphatase 163 (*)     Globulin 3.6 (*)     All other components within normal limits    Narrative:     NELL [place]  PETRA tel. [phone],  Chemistry results called to and read back by rajan cr, 08/09/2021  18:53, by [facility]  Chemistry results called to and read back by leslie harvey, 08/09/2021  18:48, by MARSHAL   TROPONIN - Abnormal; Notable for the following components:    Troponin 0.012 (*)     All other components within normal limits    Narrative:     Hoang Quintanilla  PETRA tel. [phone],  Chemistry results called to and read back by leslie harvey, 08/09/2021  18:48, by MARSHAL   CBC WITH AUTO DIFFERENTIAL - Abnormal; Notable for the following components:    MCHC 32.0 (*)     Platelets 733 (*)     All other components within normal limits   MICROSCOPIC URINALYSIS - Abnormal; Notable for the following components:    RBC, UA 3-5 (*)     Bacteria, UA MODERATE (*)     All other components within normal limits   POCT GLUCOSE - Abnormal; Notable for the following components:    POC Glucose >600 (*)     All other components within normal limits   POCT GLUCOSE - Abnormal; Notable for the following components:    POC Glucose >600 (*)     All other components within normal limits   POCT GLUCOSE - Normal   CULTURE, URINE   BETA-HYDROXYBUTYRATE     Troponin is mildly elevated similar when compared to previous from 3 months ago  All other labs were within normal range or not returned as of this dictation. EMERGENCY DEPARTMENT COURSE and DIFFERENTIAL DIAGNOSIS/MDM:   Vitals:    Vitals:    08/09/21 1750 08/09/21 1952   BP: (!) 154/79 (!) 152/67   Pulse: 106 83   Resp: 16 20   Temp: 97.6 °F (36.4 °C)    TempSrc: Oral    SpO2: 97% 97%   Weight: 96 lb 3.2 oz (43.6 kg)    Height: 5' 4\" (1.626 m)      Treatment and course: Bedside Accu-Chek was obtained which did read high IV was established normal saline 500 mL IV bolus was initiated along with insulin R 7 units IV. Repeat normal saline 500 mL IV bolus was given Rocephin 1 g IV was initiated with urine findings of urinary tract infection. Repeat blood sugar 1 hour post medication improved in the 400s    Coordination of CARE: A call was placed out to Dr. Ms. DEJESUS Case was discussed patient will be brought in for further evaluation and care    FINAL IMPRESSION      1. Hyperglycemia    2. General weakness    3. Elevated troponin    4. Urinary tract infection without hematuria, site unspecified          DISPOSITION/PLAN   DISPOSITION Admitted 08/09/2021 07:50:35 PM  Patient awaiting bed placement in stable condition    PATIENT REFERRED TO:  No follow-up provider specified. DISCHARGE MEDICATIONS:  New Prescriptions    No medications on file     Controlled Substances Monitoring:     No flowsheet data found.     (Please note that portions of this note were completed with a voice recognition program.  Efforts were made to edit the dictations but occasionally words are mis-transcribed.)    Shruthi Larry DO (electronically signed)  Attending Emergency Physician            Shruthi Larry DO  08/09/21 [id number]

## 2021-08-10 LAB
ANION GAP SERPL CALCULATED.3IONS-SCNC: 12 MEQ/L (ref 9–15)
BUN BLDV-MCNC: 24 MG/DL (ref 8–23)
CALCIUM SERPL-MCNC: 9.1 MG/DL (ref 8.5–9.9)
CHLORIDE BLD-SCNC: 103 MEQ/L (ref 95–107)
CO2: 25 MEQ/L (ref 20–31)
CREAT SERPL-MCNC: 1.18 MG/DL (ref 0.5–0.9)
EKG ATRIAL RATE: 93 BPM
EKG P AXIS: 77 DEGREES
EKG P-R INTERVAL: 184 MS
EKG Q-T INTERVAL: 430 MS
EKG QRS DURATION: 160 MS
EKG QTC CALCULATION (BAZETT): 534 MS
EKG R AXIS: -70 DEGREES
EKG T AXIS: 103 DEGREES
EKG VENTRICULAR RATE: 93 BPM
GFR AFRICAN AMERICAN: 51.7
GFR NON-AFRICAN AMERICAN: 42.7
GLUCOSE BLD-MCNC: 111 MG/DL (ref 70–99)
GLUCOSE BLD-MCNC: 136 MG/DL (ref 60–115)
GLUCOSE BLD-MCNC: 182 MG/DL (ref 60–115)
GLUCOSE BLD-MCNC: 220 MG/DL (ref 60–115)
GLUCOSE BLD-MCNC: 91 MG/DL (ref 60–115)
HCT VFR BLD CALC: 34.5 % (ref 37–47)
HEMOGLOBIN: 11.4 G/DL (ref 11.2–15.7)
MCH RBC QN AUTO: 28.8 PG (ref 25.6–32.2)
MCHC RBC AUTO-ENTMCNC: 33 % (ref 32.2–35.5)
MCV RBC AUTO: 87.1 FL (ref 79.4–94.8)
PDW BLD-RTO: 13.5 % (ref 11.7–14.4)
PERFORMED ON: ABNORMAL
PERFORMED ON: NORMAL
PLATELET # BLD: 160 K/UL (ref 182–369)
POTASSIUM REFLEX MAGNESIUM: 4.1 MEQ/L (ref 3.4–4.9)
RBC # BLD: 3.96 M/UL (ref 3.93–5.22)
SODIUM BLD-SCNC: 140 MEQ/L (ref 135–144)
TROPONIN: 0.01 NG/ML (ref 0–0.01)
WBC # BLD: 6.8 K/UL (ref 4–10)

## 2021-08-10 PROCEDURE — 36415 COLL VENOUS BLD VENIPUNCTURE: CPT

## 2021-08-10 PROCEDURE — 85027 COMPLETE CBC AUTOMATED: CPT

## 2021-08-10 PROCEDURE — 80048 BASIC METABOLIC PNL TOTAL CA: CPT

## 2021-08-10 PROCEDURE — 93010 ELECTROCARDIOGRAM REPORT: CPT | Performed by: INTERNAL MEDICINE

## 2021-08-10 PROCEDURE — 97162 PT EVAL MOD COMPLEX 30 MIN: CPT

## 2021-08-10 PROCEDURE — 97116 GAIT TRAINING THERAPY: CPT

## 2021-08-10 PROCEDURE — 97166 OT EVAL MOD COMPLEX 45 MIN: CPT

## 2021-08-10 PROCEDURE — 6360000002 HC RX W HCPCS: Performed by: INTERNAL MEDICINE

## 2021-08-10 PROCEDURE — 6370000000 HC RX 637 (ALT 250 FOR IP): Performed by: INTERNAL MEDICINE

## 2021-08-10 PROCEDURE — 84484 ASSAY OF TROPONIN QUANT: CPT

## 2021-08-10 PROCEDURE — 1210000000 HC MED SURG R&B

## 2021-08-10 PROCEDURE — 97530 THERAPEUTIC ACTIVITIES: CPT

## 2021-08-10 PROCEDURE — 2580000003 HC RX 258: Performed by: INTERNAL MEDICINE

## 2021-08-10 RX ORDER — INSULIN GLARGINE 100 [IU]/ML
12 INJECTION, SOLUTION SUBCUTANEOUS NIGHTLY
Status: DISCONTINUED | OUTPATIENT
Start: 2021-08-10 | End: 2021-08-13 | Stop reason: HOSPADM

## 2021-08-10 RX ORDER — AMLODIPINE BESYLATE 2.5 MG/1
2.5 TABLET ORAL DAILY
Status: DISCONTINUED | OUTPATIENT
Start: 2021-08-10 | End: 2021-08-13 | Stop reason: HOSPADM

## 2021-08-10 RX ORDER — 0.9 % SODIUM CHLORIDE 0.9 %
500 INTRAVENOUS SOLUTION INTRAVENOUS ONCE
Status: COMPLETED | OUTPATIENT
Start: 2021-08-10 | End: 2021-08-11

## 2021-08-10 RX ADMIN — DONEPEZIL HYDROCHLORIDE 5 MG: 5 TABLET, FILM COATED ORAL at 20:37

## 2021-08-10 RX ADMIN — CARVEDILOL 3.12 MG: 3.12 TABLET, FILM COATED ORAL at 08:45

## 2021-08-10 RX ADMIN — ASPIRIN 81 MG: 81 TABLET, COATED ORAL at 08:45

## 2021-08-10 RX ADMIN — CEFTRIAXONE 1000 MG: 1 INJECTION, POWDER, FOR SOLUTION INTRAMUSCULAR; INTRAVENOUS at 20:48

## 2021-08-10 RX ADMIN — INSULIN GLARGINE 12 UNITS: 100 INJECTION, SOLUTION SUBCUTANEOUS at 20:37

## 2021-08-10 RX ADMIN — ALOGLIPTIN 6.25 MG: 12.5 TABLET, FILM COATED ORAL at 08:45

## 2021-08-10 RX ADMIN — SODIUM CHLORIDE 500 ML: 9 INJECTION, SOLUTION INTRAVENOUS at 21:58

## 2021-08-10 RX ADMIN — Medication 10 ML: at 20:48

## 2021-08-10 RX ADMIN — AMLODIPINE BESYLATE 2.5 MG: 2.5 TABLET ORAL at 12:25

## 2021-08-10 RX ADMIN — MAGNESIUM OXIDE 400 MG: 400 TABLET ORAL at 08:45

## 2021-08-10 RX ADMIN — CARVEDILOL 3.12 MG: 3.12 TABLET, FILM COATED ORAL at 16:36

## 2021-08-10 RX ADMIN — EZETIMIBE 10 MG: 10 TABLET ORAL at 08:45

## 2021-08-10 ASSESSMENT — PAIN - FUNCTIONAL ASSESSMENT: PAIN_FUNCTIONAL_ASSESSMENT: ACTIVITIES ARE NOT PREVENTED

## 2021-08-10 ASSESSMENT — PAIN SCALES - GENERAL
PAINLEVEL_OUTOF10: 0

## 2021-08-10 ASSESSMENT — PAIN DESCRIPTION - PROGRESSION
CLINICAL_PROGRESSION: NOT CHANGED
CLINICAL_PROGRESSION: NOT CHANGED

## 2021-08-10 NOTE — PROGRESS NOTES
I called her daughter to give her update but she did not answer the phone. I left a message for her. She called me back. I spent about 20 minutes discussing findings, treatment plan, goals of care. Answered all of her questions. Robson Valentin is in agreement not to proceed with any additional cardiac work-up and/or investigation at this time for flat the troponin elevation. Robson Valentin reported that her mom does not want any life support or resuscitation I would change her CODE STATUS to CCA. Robson Valentin might lean for DNR CC in the future.

## 2021-08-10 NOTE — PROGRESS NOTES
Occupational Therapy   Occupational Therapy Initial Assessment- Med  Date: 8/10/2021   Patient Name: Jerson Brennan  MRN: [id number]     : 1928    Date of Service: 8/10/2021    Discharge Recommendations:  Home with Home health OT, Home with assist PRN       Assessment   Performance deficits / Impairments: Decreased functional mobility ; Decreased ADL status; Decreased endurance;Decreased balance;Decreased high-level IADLs  Assessment: Pt is a 79y/o female PLOF lives home alone, was I/MI with ADL, whom presents [facility] 2* Hyperglycemia, elevated troponin, UTI. Pt demo's the above resulting perf def/impair and would benefit from OT skilled services to maximize strength/end and safety/I with ADL for safe d/c transition. Treatment Diagnosis: Hyperglycemia, elevated troponin, UTI  Prognosis: Good  History: multi comorb  Exam: 5 perf def/impair  OT Education: OT Role;Plan of Care  REQUIRES OT FOLLOW UP: Yes           Patient Diagnosis(es): The primary encounter diagnosis was Hyperglycemia. Diagnoses of General weakness, Elevated troponin, and Urinary tract infection without hematuria, site unspecified were also pertinent to this visit. has a past medical history of Anemia, Atrial fibrillation ([address] CAD (coronary artery disease), Cardiomyopathy ([address] CHF (congestive heart failure) ([address] Diabetes mellitus ([address] and Hypertension. has a past surgical history that includes Pacemaker insertion; Coronary angioplasty with stent; Hip fracture surgery (Left, 2012); Wrist fracture surgery (Left, 2012); Appendectomy; Tonsillectomy; and Forearm surgery (Right, 2021).     Treatment Diagnosis: Hyperglycemia, elevated troponin, UTI      Restrictions  Restrictions/Precautions  Restrictions/Precautions: Fall Risk    Subjective   General  Chart Reviewed: Yes  Patient assessed for rehabilitation services?: Yes  Additional Pertinent Hx: ORIF May 13th R distal radius  Family / Caregiver Present: No  Referring Practitioner: Dr. Bakari Armenta  Diagnosis: Hyperglycemia, elevated troponin, UTI  Subjective  Subjective: Pt agreeable to OT eval/tx  Patient Currently in Pain: No  Pain Assessment  Pain Assessment: 0-10  Pain Level: 0  Vital Signs  Patient Currently in Pain: No  Oxygen Therapy  SpO2: 97 %  O2 Device: None (Room air)     Social/Functional History  Social/Functional History  Lives With: Alone  Type of Home: House  Home Layout: Two level, Performs ADL's on one level, Able to Live on Main level with bedroom/bathroom  Home Access: Level entry  Bathroom Shower/Tub: Walk-in shower, Shower chair with back  [employer]: Handicap height  Bathroom Equipment: Grab bars in shower, Shower chair, Hand-held shower  Bathroom Accessibility: Walker accessible  Home Equipment: Rolling walker, Alert Button, Reacher, Cane (NBQC- borrowed from friend, alert button needs to be fixed)  Receives Help From: Family, Friend(s)  ADL Assistance: Independent  Homemaking Assistance: Independent  Homemaking Responsibilities:  (pt does own laundry and cooking, dtr cleans)  Ambulation Assistance: Independent (Uses SPC indoors, NBQC outdoors)  Active : No  Occupation: Other(comment)  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  Leisure & Hobbies: [place]       Objective   Vision: Impaired  Vision Exceptions: Wears glasses at all times  Hearing: Exceptions to [facility]  Hearing Exceptions: Hard of hearing/hearing concerns;Bilateral hearing aid    Orientation  Overall Orientation Status: Within Functional Limits  Observation/Palpation  Observation: Wears R wrist splint, glasses, telemetry monitor  Functional Mobility  Functional - Mobility Device: Rolling Walker  Activity: Other (level tile surface)  Assist Level: Contact guard assistance (/SBA)  ADL  Feeding: Modified independent   Grooming: Modified independent   UE Bathing: Supervision  LE Bathing: Supervision  UE Dressing: Setup  LE Dressing: Contact guard assistance;Stand by assistance; Increased time to complete  Toileting: Stand by assistance  Tone RUE  RUE Tone: Normotonic  Tone LUE  LUE Tone: Normotonic  Coordination  Movements Are Fluid And Coordinated: Yes        Transfers  Sit to stand: Stand by assistance;Supervision  Stand to sit: Stand by assistance;Supervision              Sensation  Overall Sensation Status: WFL        LUE AROM (degrees)  LUE AROM : WFL  Left Hand AROM (degrees)  Left Hand AROM: WFL  RUE AROM (degrees)  RUE AROM : WFL  Right Hand AROM (degrees)  Right Hand AROM: WFL  LUE Strength  Gross LUE Strength: WFL  RUE Strength  Gross RUE Strength: WFL     Hand Dominance  Hand Dominance: Right             Plan   Plan  Times per week: 3-6x/wk  Times per day: Daily  Plan weeks: <1- med pt  Current Treatment Recommendations: Strengthening, Balance Training, Functional Mobility Training, Endurance Training, Safety Education & Training, Patient/Caregiver Education & Training, Self-Care / ADL, Home Management Training      Goals  Short term goals  Time Frame for Short term goals: 3-5 days- med pt  Short term goal 1: I BUE HEP  Short term goal 2: Spv toileting  Short term goal 3: Spv LB dressing and bathing  Long term goals  Time Frame for Long term goals : Same as STGs  Long term goal 1: Same as STGs  Long term goal 2: Same as STGs  Patient Goals   Patient goals : \"I was hoping I could get to go home today\"       Therapy Time   Individual Concurrent Group Co-treatment   Time In  9:30         Time Out  10:00         Minutes  Dalia Conway

## 2021-08-10 NOTE — H&P
Hospital Medicine  History and Physical    Patient:  Mary Romano  MRN: [id number]    CHIEF COMPLAINT:    Chief Complaint   Patient presents with    Hyperglycemia     x3 hours       History Obtained From:  Patient, EMR  Primary Care Physician: Valentino Thomas MD    HISTORY OF PRESENT ILLNESS:   The patient is a 80 y.o. female with PMH of HTN, CAD, paced rhythm. Came in with high blood sugar and was found to have multiple medical issues as listed below. Patient denies any chest pain or palpitation. No cough or congestion. Patient reported having elevated temperature. Past Medical History:      Diagnosis Date    Anemia     Atrial fibrillation (HCC)     CAD (coronary artery disease)     Cardiomy[facility] ([facility]     CHF (congestive heart failure) ([facility]     Diabetes mellitus ([facility]     Hypertension        Past Surgical History:      Procedure Laterality Date    APPENDECTOMY      CORONARY ANGIOPLASTY WITH STENT PLACEMENT      FOREARM SURGERY Right 5/13/2021    CLOSED  REDUCTION INTERNAL FIXATION RIGHT  DISTAL RADIUS PINNING performed by Noemy Glass MD at [address] Left 12/2012    PACEMAKER INSERTION      TONSILLECTOMY      WRIST FRACTURE SURGERY Left 12/2012       Medications Prior to Admission:    Prior to Admission medications    Medication Sig Start Date End Date Taking?  Authorizing Provider   potassium chloride (KLOR-CON M) 10 MEQ extended release tablet Take 20 mEq by mouth daily    Yes Historical Provider, MD   sodium bicarbonate 325 MG tablet Take 650 mg by mouth 3 times daily    Yes Historical Provider, MD   mirtazapine (REMERON) 15 MG tablet Take 7.5 mg by mouth nightly    Yes Historical Provider, MD   donepezil (ARICEPT) 5 MG tablet Take 5 mg by mouth nightly   Yes Historical Provider, MD   SITagliptin (JANUVIA) 100 MG tablet Take 100 mg by mouth daily  5/30/18  Yes Historical Provider, MD   insulin glargine (LANTUS) 100 UNIT/ML injection pen 8 Units nightly  5/29/18  Yes Historical Provider, MD   carvedilol (COREG) 3.125 MG tablet 3.125 mg 2 times daily  6/5/18  Yes Historical Provider, MD       Allergies:  Tylenol [acetaminophen]    Social History:   TOBACCO:   reports that she has quit smoking. She has never used smokeless tobacco.  ETOH:   reports previous alcohol use. Family History:   History reviewed. No pertinent family history. REVIEW OF SYSTEMS:  Ten systems reviewed and negative except for stated in HPI    Physical Exam:    Vitals: BP (!) 169/69   Pulse 62   Temp 97.2 °F (36.2 °C) (Oral)   Resp 16   Ht 5' 4\" (1.626 m)   Wt 100 lb (45.4 kg)   SpO2 98%   BMI 17.16 kg/m²   General appearance: alert, appears stated age and cooperative, no acute distress, patient is frail and cachectic in appearance. Skin: Skin color, texture, turgor normal. No rashes or lesions  HEENT: Head: Normocephalic, no lesions, without obvious abnormality. Neck: no adenopathy, no carotid bruit, no JVD, supple, symmetrical, trachea midline and thyroid not enlarged, symmetric, no tenderness/mass/nodules  Lungs: clear to auscultation bilaterally  Heart: regular rate and rhythm, S1, S2 normal, no murmur, click, rub or gallop  Abdomen: soft, non-tender; bowel sounds normal; no masses,  no organomegaly  Extremities: extremities normal, atraumatic, no cyanosis or edema upper and lower extremities muscle wasting and atrophy. Neurologic: Mental status: Alert, oriented to place and person. Definite hearing loss. Moderate cognitive loss. Patient is able to answer simple questions. She is unable to engage in complex conversation. Symmetrical motor and tone.     Recent Labs     08/09/21  1845 08/10/21  0537   WBC 6.4 6.8   HGB 12.4 11.4   * 160*     Recent Labs     08/09/21  1825 08/09/21  1953 08/10/21  0537   *  --  140   K 5.1*  --  4.1   CL 92*  --  103   CO2 23  --  25   BUN 32*  --  24*   CREATININE 1.56*  --  1.18*   GLUCOSE 840* 425 111*   AST 18  --   --    ALT 13  -- Ischemic cardiomyopathy I25.5    CAD (coronary artery disease) I25.10    Hypertension I10    Pure hypercholesterolemia E78.00    Closed nondisp transvrs fracture of right patella with routine healing S82.034D    Diabetes mellitus ([facility] E11.9    Pacemaker Z95.0    Cardiomyopathy ([facility] I42.9    Closed fracture of right distal radius and ulna S52.501A, S52.601A    Closed nondisplaced transverse fracture of right patella with routine healing S82.034D    Type 2 diabetes mellitus with hyperosmolar hyperglycemic state (HHS) ([facility] E11.00, E11.65       Rivas Landis MD, MD  Admitting Hospitalist    TTS: 85mins where I focused more than 75% of my attention on rendering care, and planning treatment course for this patient, in addition to talking to RN team, mid levels, consulting with other physicians and following up on labs and imaging. High Risk Readmission Screening Tool Score Noted.      Emergency Contact: (0) independent

## 2021-08-10 NOTE — PROGRESS NOTES
Pharmacy Dose Adjustment Per Protocol    Gary Ortiz is a 80 y.o. female. Recent Labs     08/09/21  1825   BUN 32*   CREATININE 1.56*   CrCl cannot be calculated (Unknown ideal weight.). [place] Stands Estimate 10 ml/min  Height: 5' 4\" (162.6 cm), Weight: 100 lb (45.4 kg)    Drug Ordered Therapeutic Interchange (CrCL ? 30 mL/min)   [x] Enoxaparin 40 mg subq daily [x] Enoxaparin 30 mg subq daily   [] Enoxaparin 1 mg/kg subq BID [] Enoxaparin ________ (1 mg/kg) subq daily    [] Enoxaparin 30 subq BID [] Enoxaparin 30 mg subq daily     Enoxaparin decreased per protocol due to renal function.     Thank you,

## 2021-08-10 NOTE — PROGRESS NOTES
Physical Therapy    Facility/Department: [facility] MED SURG UNIT  Initial Assessment -Med Surg     NAME: Vianca Guallpa  : 1928  MRN: [id number]    Date of Service: 8/10/2021    Discharge Recommendations:  Home with assist PRN, Home with Home health PT        Assessment   Body structures, Functions, Activity limitations: Decreased functional mobility ; Decreased endurance;Decreased strength  Assessment: Pt is a 79 yo female who has been admitted to [facility] for hyperglycemia and weakness. PT completed Tita Surg evaluation. Pt was able to ambulate safely in the hallway with [employer] with a quick speed. Pt then ambulated with her [employer] but pt tended to carry her quad cane at times or place in front of her right foot. PT recommed that pt use her st cane at home vs a quad cane and then the [employer] when outdoors. PT is also recommending home with PRN from her family and home PT. PT was able to speak with pts daughter, Derrick Lee, regarding my recommendations. Pt was currently receiving home health care so she plans to have them continue plus she is planning to have an aide come the evening for meal prep to making sure pt is taking her medications. Treatment Diagnosis: Hyperglycemia and weakness; UTI  Prognosis: Good  Decision Making: Medium Complexity  REQUIRES PT FOLLOW UP: Yes       Patient Diagnosis(es): The primary encounter diagnosis was Hyperglycemia. Diagnoses of General weakness, Elevated troponin, and Urinary tract infection without hematuria, site unspecified were also pertinent to this visit. has a past medical history of Anemia, Atrial fibrillation ([address] CAD (coronary artery disease), Cardiomyopathy ([address] CHF (congestive heart failure) ([address] Diabetes mellitus ([facility] [address] [facility] and Hypertension. has a past surgical history that includes Pacemaker insertion; Coronary angioplasty with stent; Hip fracture surgery (Left, 2012); Wrist fracture surgery (Left, 2012); Appendectomy;  Tonsillectomy; and Forearm surgery (Right, 5/13/2021).     Restrictions  Restrictions/Precautions  Restrictions/Precautions: Fall Risk  Vision/Hearing        Subjective  General  Patient assessed for rehabilitation services?: Yes  Family / Caregiver Present: No  Referring Practitioner: Gris Gamino  Referral Date : 08/10/21  Diagnosis: Hyperglycemia and weakness  Subjective  Subjective: Pt reports not having any pain  Pain Screening  Patient Currently in Pain: No  Vital Signs  Patient Currently in Pain: No       Orientation     Social/Functional History  Social/Functional History  Lives With: Alone  Type of Home: House  Home Layout: Two level, Performs ADL's on one level, Able to Live on Main level with bedroom/bathroom  Home Access: Level entry  Bathroom Shower/Tub: Walk-in shower, Shower chair with back  [employer]: Handicap height  Bathroom Equipment: Grab bars in shower, Shower chair, Hand-held shower  Bathroom Accessibility: Walker accessible  Home Equipment: Rolling walker, Alert Button, Reacher, Cane (NBQC- borrowed from friend, alert button needs to be fixed)  Receives Help From: Family, Friend(s)  ADL Assistance: Independent  Homemaking Assistance: Independent  Homemaking Responsibilities:  (pt does own laundry and cooking, dtr cleans)  Ambulation Assistance: Independent (Uses SPC indoors, NBQC outdoors)  Active : No  Occupation: Other(comment)  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  Leisure & Hobbies: [place]  Cognition        Objective          AROM RLE (degrees)  RLE AROM: WNL  AROM LLE (degrees)  LLE AROM : WNL  AROM RUE (degrees)  RUE AROM : WNL  AROM LUE (degrees)  LUE AROM : WNL  Strength RLE  Strength RLE: WFL  Strength LLE  Strength LLE: WFL  Strength RUE  Strength RUE: WFL  Strength LUE  Strength LUE: WFL           Transfers  Sit to Stand: Contact guard assistance  Stand to sit: Contact guard assistance  Ambulation  Ambulation?: Yes  More Ambulation?: Yes  Ambulation 1  Surface: level tile  Device: Rolling Walker  Assistance: Contact guard assistance  Distance: Pt ambulated with a flexed forward posture with a fast pace needing cues to stay within the walker. Comments: 75'x 1  Ambulation 2  Surface - 2: level tile  Device 2: Small Base Quad Care  Assistance 2: Contact guard assistance  Quality of Gait 2: Pt ambulated with decreased step length with a step to then step through gait pattern tending to carry her [employer] [place] and sometimes walking into the cane at times. Gait Deviations: Slow Emerita;Decreased step length  Distance: 75'x 1  Comments: PT recommended that pt use a st cane vs a quad cane due to cane being to cumbersome for pt and also recommended using a [facility] when outdoors for safety. Plan   Plan  Times per week: 5-7 days per week  Times per day: Daily (QD to BID)  Plan weeks: 3-5 Med Surg  Current Treatment Recommendations: Strengthening, Transfer Training, Endurance Training, Patient/Caregiver Education & Training, Pain Management, Balance Training, Home Exercise Program, Functional Mobility Training, Safety Education & Training, Gait Training    G-Code       OutComes Score                                                  AM-PAC Score             Goals  Short term goals  Time Frame for Short term goals: 3-5 days Med Surg  Short term goal 1: Supervision with bed mobility  Short term goal 2: Supervision with transfers  Short term goal 3: Pt able to ambulate with AD [id number]'x 1 with CGA  Short term goal 4: Pt able to be on her feet for >5 min before needing to rest due to fatigue  Short term goal 5: Pt indep wht HEP  Long term goals  Time Frame for Long term goals : Same as STGs  Patient Goals   Patient goals :  To get stronger       Therapy Time   Individual Concurrent Group Co-treatment   Time In  9:00am          Time Out  10:00am          Minutes  60 min                  Eric Muniz, FA#[id number]

## 2021-08-10 NOTE — PROGRESS NOTES
Received critical trop of 0.015. Previous trop 0.012. VSS . Denies chest pain or SOB. Perfect serve sent to Dr. Gris Gamino to make aware. Status read.

## 2021-08-10 NOTE — ED NOTES
Report given to [facility], pt transferred upstairs to room by Candice Dunlap RN  08/09/21 2028

## 2021-08-10 NOTE — PROGRESS NOTES
Pt admitted to room 204 dx: hyperglycemia. Pt a and o x2. Oriented to self and place ; disorientated to time. Pt up to bathroom x1 with CGA with WW. Very [facility]. BL hearing aides in place. Pt lives home alone. Daughter here to help answer admission questions/ medication review. Head to toe complete. Skin intact. Pt does have a right wrist brace on from a old break which never healed per daughter. Per daughter pt is to wear at all times through day. Pt has left chest wall pacemaker. Tele in place. Pudding and cereal given at pt request . Insulin will be given per orders. See mar. Bed alarm on and call light in reach.

## 2021-08-11 LAB
GLUCOSE BLD-MCNC: 110 MG/DL (ref 60–115)
GLUCOSE BLD-MCNC: 125 MG/DL (ref 60–115)
GLUCOSE BLD-MCNC: 169 MG/DL (ref 60–115)
GLUCOSE BLD-MCNC: 221 MG/DL (ref 60–115)
PERFORMED ON: ABNORMAL
PERFORMED ON: NORMAL

## 2021-08-11 PROCEDURE — 97110 THERAPEUTIC EXERCISES: CPT

## 2021-08-11 PROCEDURE — 1210000000 HC MED SURG R&B

## 2021-08-11 PROCEDURE — 97116 GAIT TRAINING THERAPY: CPT

## 2021-08-11 PROCEDURE — 97530 THERAPEUTIC ACTIVITIES: CPT

## 2021-08-11 PROCEDURE — 6370000000 HC RX 637 (ALT 250 FOR IP): Performed by: INTERNAL MEDICINE

## 2021-08-11 PROCEDURE — 6360000002 HC RX W HCPCS: Performed by: INTERNAL MEDICINE

## 2021-08-11 PROCEDURE — 2580000003 HC RX 258: Performed by: INTERNAL MEDICINE

## 2021-08-11 RX ADMIN — EZETIMIBE 10 MG: 10 TABLET ORAL at 08:18

## 2021-08-11 RX ADMIN — CARVEDILOL 3.12 MG: 3.12 TABLET, FILM COATED ORAL at 16:48

## 2021-08-11 RX ADMIN — CEFTRIAXONE 1000 MG: 1 INJECTION, POWDER, FOR SOLUTION INTRAMUSCULAR; INTRAVENOUS at 20:34

## 2021-08-11 RX ADMIN — CARVEDILOL 3.12 MG: 3.12 TABLET, FILM COATED ORAL at 08:19

## 2021-08-11 RX ADMIN — ALOGLIPTIN 6.25 MG: 12.5 TABLET, FILM COATED ORAL at 08:18

## 2021-08-11 RX ADMIN — ASPIRIN 81 MG: 81 TABLET, COATED ORAL at 08:19

## 2021-08-11 RX ADMIN — MAGNESIUM OXIDE 400 MG: 400 TABLET ORAL at 08:19

## 2021-08-11 RX ADMIN — Medication 10 ML: at 20:34

## 2021-08-11 RX ADMIN — ENOXAPARIN SODIUM 30 MG: 30 INJECTION, SOLUTION INTRAVENOUS; SUBCUTANEOUS at 08:18

## 2021-08-11 RX ADMIN — INSULIN GLARGINE 12 UNITS: 100 INJECTION, SOLUTION SUBCUTANEOUS at 20:28

## 2021-08-11 RX ADMIN — GABAPENTIN 100 MG: 100 CAPSULE ORAL at 08:21

## 2021-08-11 RX ADMIN — AMLODIPINE BESYLATE 2.5 MG: 2.5 TABLET ORAL at 08:19

## 2021-08-11 RX ADMIN — DONEPEZIL HYDROCHLORIDE 5 MG: 5 TABLET, FILM COATED ORAL at 20:28

## 2021-08-11 ASSESSMENT — PAIN DESCRIPTION - PROGRESSION
CLINICAL_PROGRESSION: NOT CHANGED

## 2021-08-11 ASSESSMENT — PAIN SCALES - GENERAL: PAINLEVEL_OUTOF10: 0

## 2021-08-11 ASSESSMENT — PAIN - FUNCTIONAL ASSESSMENT: PAIN_FUNCTIONAL_ASSESSMENT: ACTIVITIES ARE NOT PREVENTED

## 2021-08-11 NOTE — PROGRESS NOTES
needs.  Upon visit patient is alert and oriented to person, place, and situation. Patient reports residing at home alone. Patient reports being able to care for own ADLS however reports that daughter Elena Jose is making arrangements for more help at home. Patient was evaluated by PT/OT and therapies recommending HHC and prn assistance upon DC from [facility]. This  reviewed therapy recommendations. Patient reports that she is not sure if [facility] is needed at this time and plans to further discuss [facility] with peyton Jose before making any decisions.   SS to continue to follow       Electronically signed by MABLE Durand on 8/11/2021 at 6:30 PM

## 2021-08-11 NOTE — PROGRESS NOTES
Patient is doing very well. No chest pain or palpitation. No abdominal pain, nausea or vomiting. No headaches, loss of consciousness or seizure. ROS: 12 system review otherwise is negative for acute signs or symptoms over the last 24 hrs. Skin: Skin color, texture, turgor normal. No rashes or lesions  HEENT: Head: Normocephalic, no lesions, without obvious abnormality. Neck: no adenopathy, no carotid bruit, no JVD, supple, symmetrical, trachea midline and thyroid not enlarged, symmetric, no tenderness/mass/nodules  Lungs: clear to auscultation bilaterally  Heart: regular rate and rhythm, S1, S2 normal, no murmur, click, rub or gallop  Abdomen: soft, non-tender; bowel sounds normal; no masses,  no organomegaly  Extremities: extremities normal, atraumatic, no cyanosis or edema upper and lower extremities muscle wasting and atrophy. Neurologic: Mental status: Alert, oriented to place and person. Definite hearing loss. Moderate cognitive loss. Patient is able to answer simple questions. She is unable to engage in complex conversation. Symmetrical motor and tone. Assessment and plan:     *Hyperosmolar hyperglycemia. Known type 2 diabetes. Resolved.     *Acute kidney failure. Improving.     *UTI. On Rocephin pending culture report.     *Hypertension.     *CAD with an elevated troponin. The patient has paced rhythm. No chest pain or palpitation. No clinical evidence to suggest acute coronary syndrome or acute plaque rupture. Her troponin is flat.     *Cachexia, frailty, muscle wasting, failure to thrive.     *Multiple other medical problems not listed above. Continue current treatment plan. Urine culture is pending. Continue sliding scale. Continue conservative medical care as agreed on by her daughter Waldo Multani. I discussed with Waldo Multani the potential need for skilled. Waldo Multani stated that patient was just discharged from a skilled the unit about 10 days ago.   She wants to take her back home.  Potential discharge tomorrow. I may or may not have addressed all of this pt symptoms, medical issues, abnormal labs and findings. Pt will need additional work up, investigation, testing, surveillance, and treatment to be done at a later time and date during this hospitalization or post discharge by PCP and other out pt providers.

## 2021-08-11 NOTE — PROGRESS NOTES
Occupational Therapy  Facility/Department: [facility] MED SURG UNIT  Daily Treatment Note  NAME: Lea Whitfield  : 1928  MRN: [id number]    Date of Service: 2021    Discharge Recommendations:  Home with Home health OT, Home with assist PRN       Assessment      REQUIRES OT FOLLOW UP: Yes     Pt. Is agreeable to OT. Pt. Stated she has no pain. Pt. Has a splint on her right wrist. Pt. Is not on O2 and does not appear SOB. Pt. Is polite and cooperative with OT. Pt. Was bathed and dressed already. Pt. demonstrated transferring at [facility] with vc for hand placement. Pt. Tolerated standing for 6-7 minutes with ww 2 x. Pt. Tolerated FM UB activity with BUE to increase FM skills at supervision. Pt. Demonstrated UB exercises with BUE to increase UB strength 20 reps each. Trained and educated pt. In safety techniques. Answered pt.'s questions and concerns. Patient Diagnosis(es): The primary encounter diagnosis was Hyperglycemia. Diagnoses of General weakness, Elevated troponin, and Urinary tract infection without hematuria, site unspecified were also pertinent to this visit. has a past medical history of Anemia, Atrial fibrillation ([address] CAD (coronary artery disease), Cardiomyopathy ([address] CHF (congestive heart failure) ([address] Diabetes mellitus ([address] and Hypertension. has a past surgical history that includes Pacemaker insertion; Coronary angioplasty with stent; Hip fracture surgery (Left, 2012); Wrist fracture surgery (Left, 2012); Appendectomy; Tonsillectomy; and Forearm surgery (Right, 2021).     Restrictions  Restrictions/Precautions  Restrictions/Precautions: Fall Risk  Required Braces or Orthoses?: No  Subjective   General  Chart Reviewed: Yes  Patient assessed for rehabilitation services?: Yes  Additional Pertinent Hx: ORIF May 13th R distal radius  Family / Caregiver Present: No  Referring Practitioner: Dr. Alla Arora  Diagnosis: Hyperglycemia, elevated troponin, UTI  Subjective  Subjective: Pt agreeable to OT and stated she has no pain. Orientation     Objective              Trained and educated pt. In safety techniques. Coordination  Fine Motor:  (FM UB activity with BUE to increase FM skills-supervision)         Transfer-CGA with vc  Pt.  Tolerated standing for 6-7 minutes 2 x with ww for ambulation                          Type of ROM/Therapeutic Exercise  Type of ROM/Therapeutic Exercise: AROM  Comment:  (with BUE to increase UB strength)  Exercises  Shoulder Flexion: 20  Shoulder Extension: 20  Shoulder ABduction: 20  Shoulder ADduction: 20  Horizontal ABduction: 20  Horizontal ADduction: 20  Elbow Flexion: 20  Elbow Extension: 20  Wrist Flexion: 20  Wrist Extension: 20  Other:  (to increase UB strength)   with rest breaks                 Plan   Plan  Times per week: 3-6x/wk  Times per day: Daily  Plan weeks: <1- med pt  Current Treatment Recommendations: Strengthening, Balance Training, Functional Mobility Training, Endurance Training, Safety Education & Training, Patient/Caregiver Education & Training, Self-Care / ADL, Home Management Training  G-Code     OutComes Score                                                  AM-PAC Score             Goals  Short term goals  Time Frame for Short term goals: 3-5 days- med pt  Short term goal 1: I BUE HEP  Short term goal 2: Spv toileting  Short term goal 3: Spv LB dressing and bathing  Long term goals  Time Frame for Long term goals : Same as STGs  Long term goal 1: Same as STGs  Long term goal 2: Same as STGs  Patient Goals   Patient goals : \"I was hoping I could get to go home today\"       Therapy Time   Individual Concurrent Group Co-treatment   Time In  8:30am         Time Out  9:15am         Minutes  [address] [place] [address] Number: [id number]

## 2021-08-11 NOTE — PROGRESS NOTES
Physical Therapy  Facility/Department: [facility] MED SURG UNIT  Daily Treatment Note  NAME: Lea Whitfield  : 1928  MRN: [id number]    Date of Service: 2021    Discharge Recommendations:  (P) Home with assist PRN, Home with Home health PT        Assessment   Body structures, Functions, Activity limitations: (P) Decreased functional mobility ; Decreased endurance;Decreased strength  Assessment: (P) Pt requires max tactile and VC's for gait training with SPC, curb step trials and seated Ther EX requiring extra time for each activity. Pt [place] requiring both ear pieces in for session. Pt seems strong to ambulate and uncoordinated with SPC. Pt needed A to don shoes prior to walk. Treatment Diagnosis: (P) Hyperglycemia and weakness; UTI  Prognosis: (P) Good  Decision Making: (P) Medium Complexity  REQUIRES PT FOLLOW UP: (P) Yes     Patient Diagnosis(es): The primary encounter diagnosis was Hyperglycemia. Diagnoses of General weakness, Elevated troponin, and Urinary tract infection without hematuria, site unspecified were also pertinent to this visit. has a past medical history of Anemia, Atrial fibrillation ([address] CAD (coronary artery disease), Cardiomyopathy ([address] CHF (congestive heart failure) ([address] Diabetes mellitus ([address] and Hypertension. has a past surgical history that includes Pacemaker insertion; Coronary angioplasty with stent; Hip fracture surgery (Left, 2012); Wrist fracture surgery (Left, 2012); Appendectomy; Tonsillectomy; and Forearm surgery (Right, 2021). Restrictions  Restrictions/Precautions  Restrictions/Precautions: Fall Risk  Required Braces or Orthoses?: No  Subjective   General  Family / Caregiver Present: No  Referring Practitioner: Isma  Subjective  Subjective: Pt seated in room chair at arrival and states no pain. Pt upset because she wants to go home but states tearfully, \" I have to stay another day. \"  Pain Screening  Patient Currently in Pain: No  Pain Assessment  Pain Assessment: 0-10  Pain Level: 0  Patient's Stated Pain Goal: No pain  Clinical Progression: Not changed  Functional Pain Assessment: Activities are not prevented  Non-Pharmaceutical Pain Intervention(s): Repositioned  Response to Pain Intervention: Patient Satisfied  Multiple Pain Sites: No  Vital Signs  Patient Currently in Pain: No       Orientation     Cognition      Objective      Transfers  Sit to Stand: (P) Stand by assistance;Contact guard assistance  Stand to sit: (P) Stand by assistance  Bed to Chair: (P) Contact guard assistance  Comment: (P) VC's for hand palcement an safety awareness. Ambulation  Ambulation?: (P) Yes  Ambulation 1  Surface: (P) level tile  Device: (P) Single point cane  Assistance: (P) Contact guard assistance  Quality of Gait: (P) quick pace  Gait Deviations: (P) Deviated path  Distance: (P) 85' x 2  Comments: (P)  Pt instructed for proper technique with SPC in L UE for 2 pt gait pattern although pt confused and reverts to using R UE. Pt tends to carry [facility] and unable to follow through with instructions and sequencing with SPC. PT is sterdy on feet not seeming to need AD. Stairs/Curb  Stairs?: (P) Yes  Stairs  # Steps : (P) 3  Rails: (P) Left ascending  Curbs: (P) 4\"  Device: (P) Single pt cane  Assistance: (P) Contact guard assistance  Comment: (P) Max tactile and VC's for good technique and sequencing to increase safety awareness. Balance  Posture: (P) Good  Sitting - Static: (P) Good  Sitting - Dynamic: (P) Good  Standing - Static: (P) Fair;+  Standing - Dynamic: (P) Fair  Comments: (P) Pt stood 1-2 mins between gait trials. Exercises  Hip Flexion: (P) 2x10 seated alt B LE's  Hip Abduction: (P) 2x10 seated   Knee Long Arc Quad: (P) 2x10 seated 3 sec H  Ankle Pumps: (P) x20 seated  Comments: (P) Much tactile and VC's for proper technique with fair follow through.                         G-Code     OutComes Score AM-PAC Score             Goals  Short term goals  Time Frame for Short term goals: (P) 3-5 days Med Surg  Short term goal 1: (P) Supervision with bed mobility  Short term goal 2: (P) Supervision with transfers  Short term goal 3: (P) Pt able to ambulate with AD [id number]'x 1 with CGA  Short term goal 4: (P) Pt able to be on her feet for >5 min before needing to rest due to fatigue  Short term goal 5: (P) Pt indep wht HEP  Long term goals  Time Frame for Long term goals : (P) Same as STGs  Patient Goals   Patient goals : (P) To get stronger    Plan    Plan  Times per week: (P) 5-7 days per week  Times per day: (P) Daily  Plan weeks: (P) 3-5 Med Surg  Current Treatment Recommendations: (P) Strengthening, Transfer Training, Endurance Training, Patient/Caregiver Education & Training, Pain Management, Balance Training, Home Exercise Program, Functional Mobility Training, Safety Education & Training, Gait Training  Safety Devices  Type of devices: (P) All fall risk precautions in place, Chair alarm in place, Left in chair, Gait belt     Therapy Time   Individual Concurrent Group Co-treatment   Time In  950         Time Out  1040         Minutes  [facility]  License and Documentation Cosign  Therapy License Number: [id number]

## 2021-08-12 ENCOUNTER — HOSPITAL ENCOUNTER (OUTPATIENT)
Dept: INTERVENTIONAL RADIOLOGY/VASCULAR | Age: 86
Discharge: HOME | End: 2021-08-14
Payer: COMMERCIAL

## 2021-08-12 LAB
ANION GAP SERPL CALCULATED.3IONS-SCNC: 12 MEQ/L (ref 9–15)
BUN BLDV-MCNC: 24 MG/DL (ref 8–23)
CALCIUM SERPL-MCNC: 9.5 MG/DL (ref 8.5–9.9)
CHLORIDE BLD-SCNC: 106 MEQ/L (ref 95–107)
CO2: 22 MEQ/L (ref 20–31)
CREAT SERPL-MCNC: 1.29 MG/DL (ref 0.5–0.9)
GFR AFRICAN AMERICAN: 46.6
GFR NON-AFRICAN AMERICAN: 38.5
GLUCOSE BLD-MCNC: 101 MG/DL (ref 60–115)
GLUCOSE BLD-MCNC: 115 MG/DL (ref 60–115)
GLUCOSE BLD-MCNC: 133 MG/DL (ref 70–99)
GLUCOSE BLD-MCNC: 162 MG/DL (ref 60–115)
GLUCOSE BLD-MCNC: 277 MG/DL (ref 60–115)
ORGANISM: ABNORMAL
PERFORMED ON: ABNORMAL
PERFORMED ON: ABNORMAL
PERFORMED ON: NORMAL
PERFORMED ON: NORMAL
POTASSIUM SERPL-SCNC: 3.8 MEQ/L (ref 3.4–4.9)
SODIUM BLD-SCNC: 140 MEQ/L (ref 135–144)
URINE CULTURE, ROUTINE: ABNORMAL

## 2021-08-12 PROCEDURE — 80048 BASIC METABOLIC PNL TOTAL CA: CPT

## 2021-08-12 PROCEDURE — 6370000000 HC RX 637 (ALT 250 FOR IP): Performed by: INTERNAL MEDICINE

## 2021-08-12 PROCEDURE — 1210000000 HC MED SURG R&B

## 2021-08-12 PROCEDURE — 2709999900 IR PICC WO SQ PORT/PUMP > 5 YEARS

## 2021-08-12 PROCEDURE — 97110 THERAPEUTIC EXERCISES: CPT

## 2021-08-12 PROCEDURE — 36573 INSJ PICC RS&I 5 YR+: CPT

## 2021-08-12 PROCEDURE — 97535 SELF CARE MNGMENT TRAINING: CPT

## 2021-08-12 PROCEDURE — 2500000003 HC RX 250 WO HCPCS: Performed by: INTERNAL MEDICINE

## 2021-08-12 PROCEDURE — 2580000003 HC RX 258: Performed by: INTERNAL MEDICINE

## 2021-08-12 PROCEDURE — 6360000002 HC RX W HCPCS: Performed by: INTERNAL MEDICINE

## 2021-08-12 PROCEDURE — 36415 COLL VENOUS BLD VENIPUNCTURE: CPT

## 2021-08-12 RX ORDER — INSULIN GLARGINE 100 [IU]/ML
12 INJECTION, SOLUTION SUBCUTANEOUS NIGHTLY
Status: CANCELLED | OUTPATIENT
Start: 2021-08-12

## 2021-08-12 RX ORDER — SODIUM CHLORIDE 9 MG/ML
25 INJECTION, SOLUTION INTRAVENOUS PRN
Status: DISCONTINUED | OUTPATIENT
Start: 2021-08-12 | End: 2021-08-13 | Stop reason: HOSPADM

## 2021-08-12 RX ORDER — DEXTROSE MONOHYDRATE 25 G/50ML
12.5 INJECTION, SOLUTION INTRAVENOUS PRN
Status: CANCELLED | OUTPATIENT
Start: 2021-08-12

## 2021-08-12 RX ORDER — SODIUM CHLORIDE 0.9 % (FLUSH) 0.9 %
5-40 SYRINGE (ML) INJECTION PRN
Status: DISCONTINUED | OUTPATIENT
Start: 2021-08-12 | End: 2021-08-13 | Stop reason: HOSPADM

## 2021-08-12 RX ORDER — ASPIRIN 81 MG/1
81 TABLET ORAL DAILY
Status: CANCELLED | OUTPATIENT
Start: 2021-08-13

## 2021-08-12 RX ORDER — NICOTINE POLACRILEX 4 MG
15 LOZENGE BUCCAL PRN
Status: CANCELLED | OUTPATIENT
Start: 2021-08-12

## 2021-08-12 RX ORDER — SODIUM CHLORIDE 9 MG/ML
250 INJECTION, SOLUTION INTRAVENOUS ONCE
Status: COMPLETED | OUTPATIENT
Start: 2021-08-12 | End: 2021-08-12

## 2021-08-12 RX ORDER — LIDOCAINE HYDROCHLORIDE 10 MG/ML
5 INJECTION, SOLUTION INFILTRATION; PERINEURAL ONCE
Status: COMPLETED | OUTPATIENT
Start: 2021-08-12 | End: 2021-08-12

## 2021-08-12 RX ORDER — CARVEDILOL 3.12 MG/1
3.12 TABLET ORAL 2 TIMES DAILY WITH MEALS
Status: CANCELLED | OUTPATIENT
Start: 2021-08-12

## 2021-08-12 RX ORDER — SODIUM CHLORIDE 0.9 % (FLUSH) 0.9 %
10 SYRINGE (ML) INJECTION EVERY 12 HOURS SCHEDULED
Status: CANCELLED | OUTPATIENT
Start: 2021-08-12

## 2021-08-12 RX ORDER — SODIUM CHLORIDE 9 MG/ML
25 INJECTION, SOLUTION INTRAVENOUS PRN
Status: DISCONTINUED | OUTPATIENT
Start: 2021-08-12 | End: 2021-08-15 | Stop reason: HOSPADM

## 2021-08-12 RX ORDER — PAROXETINE HYDROCHLORIDE 20 MG/1
20 TABLET, FILM COATED ORAL NIGHTLY
Status: CANCELLED | OUTPATIENT
Start: 2021-08-12

## 2021-08-12 RX ORDER — POLYETHYLENE GLYCOL 3350 17 G/17G
17 POWDER, FOR SOLUTION ORAL DAILY PRN
Status: CANCELLED | OUTPATIENT
Start: 2021-08-12

## 2021-08-12 RX ORDER — AMLODIPINE BESYLATE 2.5 MG/1
2.5 TABLET ORAL DAILY
Status: CANCELLED | OUTPATIENT
Start: 2021-08-13

## 2021-08-12 RX ORDER — DONEPEZIL HYDROCHLORIDE 5 MG/1
5 TABLET, FILM COATED ORAL NIGHTLY
Status: CANCELLED | OUTPATIENT
Start: 2021-08-12

## 2021-08-12 RX ORDER — SODIUM CHLORIDE 0.9 % (FLUSH) 0.9 %
5-40 SYRINGE (ML) INJECTION EVERY 12 HOURS SCHEDULED
Status: DISCONTINUED | OUTPATIENT
Start: 2021-08-12 | End: 2021-08-13 | Stop reason: HOSPADM

## 2021-08-12 RX ORDER — ONDANSETRON 2 MG/ML
4 INJECTION INTRAMUSCULAR; INTRAVENOUS EVERY 6 HOURS PRN
Status: CANCELLED | OUTPATIENT
Start: 2021-08-12

## 2021-08-12 RX ORDER — DEXTROSE MONOHYDRATE 50 MG/ML
100 INJECTION, SOLUTION INTRAVENOUS PRN
Status: CANCELLED | OUTPATIENT
Start: 2021-08-12

## 2021-08-12 RX ORDER — SODIUM CHLORIDE 0.9 % (FLUSH) 0.9 %
5-40 SYRINGE (ML) INJECTION PRN
Status: DISCONTINUED | OUTPATIENT
Start: 2021-08-12 | End: 2021-08-15 | Stop reason: HOSPADM

## 2021-08-12 RX ORDER — PROMETHAZINE HYDROCHLORIDE 12.5 MG/1
12.5 TABLET ORAL EVERY 6 HOURS PRN
Status: CANCELLED | OUTPATIENT
Start: 2021-08-12

## 2021-08-12 RX ORDER — GABAPENTIN 100 MG/1
100 CAPSULE ORAL 2 TIMES DAILY
Status: CANCELLED | OUTPATIENT
Start: 2021-08-12

## 2021-08-12 RX ORDER — LIDOCAINE HYDROCHLORIDE 20 MG/ML
5 INJECTION, SOLUTION INFILTRATION; PERINEURAL ONCE
Status: COMPLETED | OUTPATIENT
Start: 2021-08-12 | End: 2021-08-12

## 2021-08-12 RX ORDER — ALOGLIPTIN 12.5 MG/1
6.25 TABLET, FILM COATED ORAL DAILY
Status: CANCELLED | OUTPATIENT
Start: 2021-08-13

## 2021-08-12 RX ORDER — EZETIMIBE 10 MG/1
10 TABLET ORAL DAILY
Status: CANCELLED | OUTPATIENT
Start: 2021-08-13

## 2021-08-12 RX ORDER — SODIUM CHLORIDE 0.9 % (FLUSH) 0.9 %
5-40 SYRINGE (ML) INJECTION EVERY 12 HOURS SCHEDULED
Status: DISCONTINUED | OUTPATIENT
Start: 2021-08-12 | End: 2021-08-15 | Stop reason: HOSPADM

## 2021-08-12 RX ADMIN — DONEPEZIL HYDROCHLORIDE 5 MG: 5 TABLET, FILM COATED ORAL at 21:11

## 2021-08-12 RX ADMIN — Medication 10 ML: at 21:12

## 2021-08-12 RX ADMIN — LIDOCAINE HYDROCHLORIDE 5 ML: 20 INJECTION, SOLUTION INFILTRATION; PERINEURAL at 12:33

## 2021-08-12 RX ADMIN — Medication 10 ML: at 10:29

## 2021-08-12 RX ADMIN — CARVEDILOL 3.12 MG: 3.12 TABLET, FILM COATED ORAL at 16:40

## 2021-08-12 RX ADMIN — MAGNESIUM OXIDE 400 MG: 400 TABLET ORAL at 08:07

## 2021-08-12 RX ADMIN — LIDOCAINE HYDROCHLORIDE 5 ML: 10 INJECTION, SOLUTION INFILTRATION; PERINEURAL at 16:41

## 2021-08-12 RX ADMIN — ALOGLIPTIN 6.25 MG: 12.5 TABLET, FILM COATED ORAL at 08:07

## 2021-08-12 RX ADMIN — GABAPENTIN 100 MG: 100 CAPSULE ORAL at 08:07

## 2021-08-12 RX ADMIN — ASPIRIN 81 MG: 81 TABLET, COATED ORAL at 08:07

## 2021-08-12 RX ADMIN — AMLODIPINE BESYLATE 2.5 MG: 2.5 TABLET ORAL at 08:07

## 2021-08-12 RX ADMIN — EZETIMIBE 10 MG: 10 TABLET ORAL at 08:07

## 2021-08-12 RX ADMIN — Medication 5 ML: at 11:08

## 2021-08-12 RX ADMIN — CARVEDILOL 3.12 MG: 3.12 TABLET, FILM COATED ORAL at 08:07

## 2021-08-12 RX ADMIN — INSULIN GLARGINE 12 UNITS: 100 INJECTION, SOLUTION SUBCUTANEOUS at 20:42

## 2021-08-12 RX ADMIN — SODIUM CHLORIDE 250 ML: 9 INJECTION, SOLUTION INTRAVENOUS at 12:32

## 2021-08-12 RX ADMIN — ERTAPENEM 500 MG: 1 INJECTION INTRAMUSCULAR; INTRAVENOUS at 10:28

## 2021-08-12 ASSESSMENT — PAIN DESCRIPTION - PROGRESSION
CLINICAL_PROGRESSION: NOT CHANGED

## 2021-08-12 ASSESSMENT — PAIN SCALES - GENERAL
PAINLEVEL_OUTOF10: 0
PAINLEVEL_OUTOF10: 0

## 2021-08-12 ASSESSMENT — PAIN - FUNCTIONAL ASSESSMENT: PAIN_FUNCTIONAL_ASSESSMENT: ACTIVITIES ARE NOT PREVENTED

## 2021-08-12 NOTE — PROGRESS NOTES
Occupational Therapy  Facility/Department: [facility] MED SURG UNIT  Daily Treatment Note  NAME: Marquis Brink  : 1928  MRN: [id number]    Date of Service: 2021    Discharge Recommendations:  Home with Home health OT, Home with assist PRN       Assessment      REQUIRES OT FOLLOW UP: Yes     Pt. Was sitting up in the recliner upon arrival. Pt. Stated she as no pain. Pt. Demonstrated UB dressing at Mod I/supervision. Pt. Demonstrated LB dressing at supervision. Trained and educated pt. In HEP. Gave pt. Handout for HEP. Pt. Demonstrated UB exercises in all planes and directions to increase UB strength. Answered pt.'s questions and concerns. Patient Diagnosis(es): The primary encounter diagnosis was Hyperglycemia. Diagnoses of General weakness, Elevated troponin, and Urinary tract infection without hematuria, site unspecified were also pertinent to this visit. has a past medical history of Anemia, Atrial fibrillation ([address] CAD (coronary artery disease), Cardiomyopathy ([address] CHF (congestive heart failure) ([address] Diabetes mellitus ([address] and Hypertension. has a past surgical history that includes Pacemaker insertion; Coronary angioplasty with stent; Hip fracture surgery (Left, 2012); Wrist fracture surgery (Left, 2012); Appendectomy; Tonsillectomy; and Forearm surgery (Right, 2021). Restrictions  Restrictions/Precautions  Restrictions/Precautions: Fall Risk  Required Braces or Orthoses?: No  Subjective   General  Chart Reviewed: Yes  Patient assessed for rehabilitation services?: Yes  Additional Pertinent Hx: ORIF May 13th R distal radius  Family / Caregiver Present: No  Referring Practitioner: Dr. Karina Ojeda  Diagnosis: Hyperglycemia, elevated troponin, UTI  Subjective  Subjective: Pt agreeable to OT and is going home today.       Orientation     Objective    ADL  UE Dressing: Modified independent ;Supervision  LE Dressing: Supervision        Standing Balance  Time:  (7-8 minutes ) Transfers  Sit to stand: Supervision  Stand to sit: Supervision                                 Trained and educated pt. In HEP. Gave pt. Handout for HEP.            Type of ROM/Therapeutic Exercise  Type of ROM/Therapeutic Exercise: AROM  Comment:  (with BUE to increase UB strength)  Exercises  Shoulder Flexion: 20  Shoulder Extension: 20  Shoulder ABduction: 20  Shoulder ADduction: 20  Horizontal ABduction: 20  Horizontal ADduction: 20  Elbow Flexion: 20  Elbow Extension: 20  Wrist Flexion: 20  Wrist Extension: 20  Other:  (to increase UB strength)                    Plan   Plan  Times per week: 3-6x/wk  Times per day: Daily  Plan weeks: <1- med pt  Current Treatment Recommendations: Strengthening, Balance Training, Functional Mobility Training, Endurance Training, Safety Education & Training, Patient/Caregiver Education & Training, Self-Care / ADL, Home Management Training  G-Code     OutComes Score                                                  AM-PAC Score             Goals  Short term goals  Time Frame for Short term goals: 3-5 days- med pt  Short term goal 1: I BUE HEP  Short term goal 2: Spv toileting  Short term goal 3: Spv LB dressing and bathing  Long term goals  Time Frame for Long term goals : Same as STGs  Long term goal 1: Same as STGs  Long term goal 2: Same as STGs  Patient Goals   Patient goals : \"I was hoping I could get to go home today\"       Therapy Time   Individual Concurrent Group Co-treatment   Time In  9:10am         Time Out  9:55am         Minutes  [address], [address] [place] [address] Number: [id number]

## 2021-08-12 NOTE — DISCHARGE SUMMARY
Hospital Medicine Discharge Summary    Sabrina Matthews  :  1928  MRN:  [id number]    Admit date:  2021  Discharge date:  2021    Admitting Physician:  Carissa Maher MD  Primary Care Physician:  Whit Angel MD      Discharge Diagnoses:      *Hyperosmolar hyperglycemia. Carey Gordon type 2 diabetes. Resolved.     *Acute kidney failure. Resolved. Patient has stage III kidney failure chronic.     *UTI.    ESBL Klebsiella UTI. Patient will be discharged home on intravenous Invanz 500 mg daily for 7 days through PICC line.     *Hypertension. Fair control.     *CAD with an elevated troponin.  The patient has paced rhythm.  No chest pain or palpitation.  No clinical evidence to suggest acute coronary syndrome or acute plaque rupture.  Her troponin is flat. Discussion took place between myself and her daughter. Comfort and conservative approach had been decided.     *Cachexia, frailty, muscle wasting, failure to thrive.     *Multiple other medical problems not listed above. Patient has multiple medical issues as listed above and others that are not listed. Patient is feeling great. She wants to go home. I do not have clear or strong clinical justification to extend inpatient hospitalization. Patient however would definitely need and require close and frequent monitoring as well as additional work-up, investigation and therapeutic intervention that potentially could take place in the outpatient setting by primary care doctor in collaboration with other specialists. That is to prevent relapse, decompensation, rehospitalization and other medical implications. To accomplish the aforementioned goals I requested home health care service. Home health care nurse will collaborate care post discharge with PCP. Discharge medications as listed on the discharge are not final or setting a stone.   These medications have to be titrated up and down to ensure that patient is in euvolemic state and that her electrolytes and bicarbonate levels are within normal range. Hospital Course:   Johanna Bojorquez is a 80 y.o. female that was admitted and treated at [facility] for the aforementioned medical issues. Her hyperglycemia and acute kidney failure had resolved. Patient was found to have UTI. Culture came back positive for ESBL Klebsiella. Discussion took place between myself and her daughter as to the best way to handle this whether the patient should stay here in a skilled or go home and have it done through home health care. The patient did not qualify to stay here for skilled just for the intravenous antibiotic infusion. The plan is to discharge patient home with home health care. Intravenous Invanz daily for 7 days will be infused through a PICC line that was inserted yesterday. Patient is on sodium bicarbonate at home 650 3 times a day. Her sodium level as well as bicarbonate levels are normal.  I had to cut it down to a 325 3 times a day. I requested weekly BMP for 4 weeks essentially to monitor her sodium level, potassium, kidney function and decide whether she needs to be on higher or lower dose of sodium bicarbonate, higher lower dose of potassium supplementation. I requested the BMP resolved to be drawn by home health care nurse and the result is to be communicated to PCP for management after she leaves the hospital.      Patient was seen by the following consultants while admitted to [facility]:   Consults:  None    Significant Diagnostic Studies:    XR CHEST PORTABLE    Result Date: 8/10/2021  EXAMINATION: XR CHEST PORTABLE CLINICAL HISTORY: WEAKNESS COMPARISONS: JULY 11, 2016 FINDINGS: Pacemaker generator and wires unchanged. Osseous structures intact. Cardiopericardial silhouette normal. Aorta calcified. Lungs clear. NO ACUTE CARDIOPULMONARY DISEASE      Discharge Medications:    Please refer to discharge for information on discharge medications.       Disposition: Discharged to skilled Home. Any [facility] needs that were indicated and/or required as been addressed and set up by Social Work. Condition at discharge: Pt was medically stable at the time of discharge. Significant improvement in clinical condition compared to initial condition at presentation to hospital    Activity: activity as tolerated, fall precautions. Total time taken for discharging this patient: 40 minutes. Greater than 70% of time was spent focused exclusively on this patient. Time was taken to review chart, discuss plans with consultants, reconciling medications, discussing plan answering questions with patient. Kieran Redd MD  8/12/2021, 9:11 AM  ----------------------------------------------------------------------------------------------------------------------    Youlanda Castleman,     Please return to ER or call 911 if you develop any significant signs or symptoms. I may not have addressed all of your medical illnesses or the abnormal blood work or imaging therefore please ask your PCP Clint Boyle MD and other out patient specialists and providers  to obtain [facility] record entirely to follow up on all of the abnormal labs, imaging and findings that I have and have not addressed during your hospitalization. Discharging you from the hospital does not mean that your medical care ends here and now. You may still need additional work up, investigation, monitoring, and treatment to be handled from this point on by outside providers including your PCP, Clint Boyle MD , Specialists and other healthcare providers. Please review your list of discharge medications prior to resuming medications you might still have at home, as the medications you need to be taking, dosages or how often you must take them may have changed.  For medication questions, contact your retail pharmacy and your PCP, Clint Boyle MD .     ** I STRONGLY RECOMMEND that you follow up with Clint Boyle MD within 3 to 5 days for a post hospitalization evaluation. This specific office visit is covered by your insurance, and is not the same as your annual doctor visit/ check up. This office visit is important, as it may prevent need for repeat and/or future hospitalizations. **    Your medical team at [facility] ([facility]) appreciates the opportunity to work with you to get well! Sincerely,  Sun Pires MD   Hospital Medicine Discharge Summary    Mary Romano  :  1928  MRN:  [id number]    Admit date:  2021  Discharge date:  2021    Admitting Physician:  Sun Pires MD  Primary Care Physician:  Valentino Thomas MD    Patient was seen by the following consultants while admitted to [facility]:   Consults:  None    Significant Diagnostic Studies:    XR CHEST PORTABLE    Result Date: 8/10/2021  EXAMINATION: XR CHEST PORTABLE CLINICAL HISTORY: WEAKNESS COMPARISONS: 2016 FINDINGS: Pacemaker generator and wires unchanged. Osseous structures intact. Cardiopericardial silhouette normal. Aorta calcified. Lungs clear. NO ACUTE CARDIOPULMONARY DISEASE      Discharge Medications:        Disposition:   Discharged to skilled Home. Any [facility] needs that were indicated and/or required as been addressed and set up by Social Work. Condition at discharge: Pt was medically stable at the time of discharge. Significant improvement in clinical condition compared to initial condition at presentation to hospital    Activity: activity as tolerated, fall precautions. Total time taken for discharging this patient: 40 minutes. Greater than 70% of time was spent focused exclusively on this patient. Time was taken to review chart, discuss plans with consultants, reconciling medications, discussing plan answering questions with patient.      Ester Smith MD  2021, 9:11 AM  ----------------------------------------------------------------------------------------------------------------------    Claudio Garcia,     Please return to ER or call 911 if you develop any significant signs or symptoms. I may not have addressed all of your medical illnesses or the abnormal blood work or imaging therefore please ask your PCP Tony Wood MD and other out patient specialists and providers  to obtain [facility] record entirely to follow up on all of the abnormal labs, imaging and findings that I have and have not addressed during your hospitalization. Discharging you from the hospital does not mean that your medical care ends here and now. You may still need additional work up, investigation, monitoring, and treatment to be handled from this point on by outside providers including your PCP, Tony Wood MD , Specialists and other healthcare providers. Please review your list of discharge medications prior to resuming medications you might still have at home, as the medications you need to be taking, dosages or how often you must take them may have changed. For medication questions, contact your retail pharmacy and your PCP, Tony Wood MD .     ** I STRONGLY RECOMMEND that you follow up with Tony Wood MD within 3 to 5 days for a post hospitalization evaluation. This specific office visit is covered by your insurance, and is not the same as your annual doctor visit/ check up. This office visit is important, as it may prevent need for repeat and/or future hospitalizations. **    Your medical team at [facility] ([facility]) appreciates the opportunity to work with you to get well!     Sincerely,  Wang Pitt MD

## 2021-08-12 NOTE — PROGRESS NOTES
Physical Therapy  Facility/Department: [facility] MED SURG UNIT  Daily Treatment Note  NAME: Kash Mayo  : 1928  MRN: [id number]    Date of Service: 2021    Discharge Recommendations:  Home with assist PRN, Home with Home health PT        Assessment      REQUIRES PT FOLLOW UP: (P) Yes     Patient Diagnosis(es): The primary encounter diagnosis was Hyperglycemia. Diagnoses of General weakness, Elevated troponin, and Urinary tract infection without hematuria, site unspecified were also pertinent to this visit. has a past medical history of Anemia, Atrial fibrillation ([address] CAD (coronary artery disease), Cardiomyopathy ([address] CHF (congestive heart failure) ([address] Diabetes mellitus ([address] and Hypertension. has a past surgical history that includes Pacemaker insertion; Coronary angioplasty with stent; Hip fracture surgery (Left, 2012); Wrist fracture surgery (Left, 2012); Appendectomy; Tonsillectomy; and Forearm surgery (Right, 2021). Restrictions  Restrictions/Precautions  Restrictions/Precautions: (P) Fall Risk  Required Braces or Orthoses?: No  Subjective   General  Family / Caregiver Present: (P) No  Referring Practitioner: (P) Isma  Subjective  Subjective: (P) Pt. out of facility for pic line per nursing.    Pain Screening  Patient Currently in Pain: (P) No  Vital Signs  Patient Currently in Pain: (P) No       Orientation     Cognition      Objective                                           G-Code     OutComes Score                                                     AM-PAC Score             Goals  Short term goals  Time Frame for Short term goals: (P) 3-5 days Med Surg  Short term goal 1: (P) Supervision with bed mobility  Short term goal 2: (P) Supervision with transfers  Short term goal 3: (P) Pt able to ambulate with AD [id number]'x 1 with CGA  Short term goal 4: (P) Pt able to be on her feet for >5 min before needing to rest due to fatigue  Short term goal 5: (P) Pt indep wht HEP  Long

## 2021-08-12 NOTE — PROGRESS NOTES
I s/w pt's daughter, Kun Lan. She was made aware that her mother will be transferred to [facility] for PICC line placement, approx 11am p/u. Patient also aware of plan and agreeable. 1030am S/w  Timmy Jones @ [address] [facility]. They will get back to me regarding pt's out of pocket cost.  [place] [phone]  [facility] [phone].    1155am  Timmy Jones informed me the patient would have a $5.00/wk copay for home IVATB infusion. They have accepted her and will be able to supply the ATB. Plan is discharge Friday. Timmy Jones said they will ship the medication out pt's residence Friday evening, with delivery by [employer] on Saturday morning.

## 2021-08-12 NOTE — PROGRESS NOTES
Pt is A&O x 3. [place]. Pleasant and cooperative with staff and care. Able to use call light to make needs known. Denies pain and shows no s/s of distress or discomfort. Continues on IV Rocephin with no adverse reactions noted. No further complaints voiced. Call light within reach. Safety maintained.

## 2021-08-13 VITALS
RESPIRATION RATE: 16 BRPM | OXYGEN SATURATION: 96 % | BODY MASS INDEX: 17.07 KG/M2 | TEMPERATURE: 97.3 F | HEIGHT: 64 IN | SYSTOLIC BLOOD PRESSURE: 149 MMHG | WEIGHT: 100 LBS | DIASTOLIC BLOOD PRESSURE: 51 MMHG | HEART RATE: 79 BPM

## 2021-08-13 LAB
GLUCOSE BLD-MCNC: 362 MG/DL (ref 60–115)
GLUCOSE BLD-MCNC: 81 MG/DL (ref 60–115)
PERFORMED ON: ABNORMAL
PERFORMED ON: NORMAL

## 2021-08-13 PROCEDURE — 6370000000 HC RX 637 (ALT 250 FOR IP): Performed by: INTERNAL MEDICINE

## 2021-08-13 PROCEDURE — 97110 THERAPEUTIC EXERCISES: CPT

## 2021-08-13 PROCEDURE — 6360000002 HC RX W HCPCS: Performed by: INTERNAL MEDICINE

## 2021-08-13 PROCEDURE — 97530 THERAPEUTIC ACTIVITIES: CPT

## 2021-08-13 PROCEDURE — 2580000003 HC RX 258: Performed by: INTERNAL MEDICINE

## 2021-08-13 RX ORDER — ASPIRIN 81 MG/1
81 TABLET ORAL DAILY
Qty: 30 TABLET | Refills: 3 | Status: SHIPPED | OUTPATIENT
Start: 2021-08-13

## 2021-08-13 RX ORDER — SODIUM BICARBONATE 325 MG/1
325 TABLET ORAL 2 TIMES DAILY
Status: ON HOLD | COMMUNITY
Start: 2021-08-13 | End: 2021-08-19 | Stop reason: HOSPADM

## 2021-08-13 RX ORDER — AMLODIPINE BESYLATE 2.5 MG/1
2.5 TABLET ORAL DAILY
Qty: 30 TABLET | Refills: 3 | Status: ON HOLD | OUTPATIENT
Start: 2021-08-14 | End: 2021-08-19 | Stop reason: HOSPADM

## 2021-08-13 RX ORDER — NICOTINE POLACRILEX 4 MG
15 LOZENGE BUCCAL PRN
Qty: 45 G | Refills: 2 | Status: ON HOLD | OUTPATIENT
Start: 2021-08-13 | End: 2022-08-31 | Stop reason: HOSPADM

## 2021-08-13 RX ORDER — POTASSIUM CHLORIDE 750 MG/1
10 TABLET, EXTENDED RELEASE ORAL DAILY
Qty: 60 TABLET | Refills: 3 | Status: ON HOLD | OUTPATIENT
Start: 2021-08-13 | End: 2022-08-31 | Stop reason: HOSPADM

## 2021-08-13 RX ADMIN — ALOGLIPTIN 6.25 MG: 12.5 TABLET, FILM COATED ORAL at 07:58

## 2021-08-13 RX ADMIN — GABAPENTIN 100 MG: 100 CAPSULE ORAL at 07:59

## 2021-08-13 RX ADMIN — ASPIRIN 81 MG: 81 TABLET, COATED ORAL at 07:58

## 2021-08-13 RX ADMIN — ERTAPENEM 500 MG: 1 INJECTION INTRAMUSCULAR; INTRAVENOUS at 10:07

## 2021-08-13 RX ADMIN — CARVEDILOL 3.12 MG: 3.12 TABLET, FILM COATED ORAL at 07:59

## 2021-08-13 RX ADMIN — EZETIMIBE 10 MG: 10 TABLET ORAL at 07:58

## 2021-08-13 RX ADMIN — Medication 10 ML: at 07:59

## 2021-08-13 RX ADMIN — MAGNESIUM OXIDE 400 MG: 400 TABLET ORAL at 07:58

## 2021-08-13 RX ADMIN — AMLODIPINE BESYLATE 2.5 MG: 2.5 TABLET ORAL at 07:59

## 2021-08-13 ASSESSMENT — PAIN SCALES - GENERAL: PAINLEVEL_OUTOF10: 0

## 2021-08-13 ASSESSMENT — PAIN DESCRIPTION - PROGRESSION: CLINICAL_PROGRESSION: NOT CHANGED

## 2021-08-13 NOTE — PROGRESS NOTES
Occupational Therapy  Facility/Department: [facility] MED SURG UNIT  Daily Treatment Note  NAME: Kash Mayo  : 1928  MRN: [id number]    Date of Service: 2021    Discharge Recommendations:  Home with Home health OT, Home with assist PRN       Assessment   Performance deficits / Impairments: Decreased functional mobility ; Decreased ADL status; Decreased endurance;Decreased balance;Decreased high-level IADLs  Assessment: Pt tolerated 1x20 reps each BUE ther ex with RBs. Pt w/o c/o pain.  came and informed pt she gets to d/c home today. BUE HEP handout provided for the above ther ex for follow through at home- pt with good understanding. Treatment Diagnosis: Hyperglycemia, elevated troponin, UTI  Prognosis: Good  REQUIRES OT FOLLOW UP: Yes         Patient Diagnosis(es): The primary encounter diagnosis was Hyperglycemia. Diagnoses of General weakness, Elevated troponin, and Urinary tract infection without hematuria, site unspecified were also pertinent to this visit. has a past medical history of Anemia, Atrial fibrillation ([address] CAD (coronary artery disease), Cardiomyopathy ([address] CHF (congestive heart failure) ([address] Diabetes mellitus ([address] and Hypertension. has a past surgical history that includes Pacemaker insertion; Coronary angioplasty with stent; Hip fracture surgery (Left, 2012); Wrist fracture surgery (Left, 2012); Appendectomy; Tonsillectomy; and Forearm surgery (Right, 2021).     Restrictions  Restrictions/Precautions  Restrictions/Precautions: Fall Risk  Required Braces or Orthoses?: No     Subjective   General  Chart Reviewed: Yes  Patient assessed for rehabilitation services?: Yes  Additional Pertinent Hx: ORIF May 13th R distal radius  Family / Caregiver Present: No  Referring Practitioner: Dr. Miguel Dooley  Diagnosis: Hyperglycemia, elevated troponin, UTI  Subjective  Subjective: Pt pleasant, agreeable to OT  Pain Assessment  Pain Assessment: 0-10  Pain Level: 0  Vital Signs  Level of Consciousness: Alert (0)  Patient Currently in Pain: No      Objective       Functional Mobility  Functional - Mobility Device: Rolling Walker  Activity: Other (long distance level tile surface in hallway)  Assist Level: Contact guard assistance (/SBA)                 Type of ROM/Therapeutic Exercise  Type of ROM/Therapeutic Exercise: AROM  Comment:  (with BUE to increase UB strength)  Exercises  Shoulder Flexion: 1x20 reps BUE for raises, forward rows, and backward rows  Horizontal ABduction: 1x20 reps BUE  Horizontal ADduction: 1x20 reps BUE  Elbow Flexion: 1x20 reps BUE  Elbow Extension: 1x20 reps BUE for chest presses  Wrist Flexion: 1x20 reps BUE  Wrist Extension: 1x20 reps BUE  Other: to inc strength/end for ADL (RB between each set)                    Plan   Plan  Times per week: 3-6x/wk  Times per day: Daily  Plan weeks: <1- med pt  Current Treatment Recommendations: Strengthening, Balance Training, Functional Mobility Training, Endurance Training, Safety Education & Training, Patient/Caregiver Education & Training, Self-Care / ADL, Home Management Training          Goals  Short term goals  Time Frame for Short term goals: 3-5 days- med pt  Short term goal 1: I BUE HEP  Short term goal 2: Spv toileting  Short term goal 3: Spv LB dressing and bathing  Long term goals  Time Frame for Long term goals : Same as STGs  Long term goal 1: Same as STGs  Long term goal 2: Same as STGs  Patient Goals   Patient goals : \"I was hoping I could get to go home today\"       Therapy Time   Individual Concurrent Group Co-treatment   Time In  8:30         Time Out  9:10         Minutes  [address]

## 2021-08-13 NOTE — PROGRESS NOTES
Discharge instructions, medications, and f/u appts reviewed with pt and daughter Lucio Boyd at bedside. All questions answered and understanding verbalized. Pt discharged with PICC line for home infusions and all documented belongings. Transported to private car via wheelchair without issue.

## 2021-08-13 NOTE — DISCHARGE INSTR - COC
Continuity of Care Form    Patient Name: Abby Soulier   :  1928  MRN:  [id number]    Admit date:  2021  Discharge date:  21    Code Status Order: DNR-CCA   Advance Directives:      Admitting Physician:  Isabel Russell MD  PCP: Gume Kuhn MD    Discharging Nurse: [facility] Unit/Room#: 0204/0204-01  Discharging Unit Phone Number: [phone]    Emergency Contact:   Extended Emergency Contact Information  Primary Emergency Contact: [address] [facility] of [address] [place] [address] Phone: [phone]  Mobile Phone: [phone]  Relation: Child    Past Surgical History:  Past Surgical History:   Procedure Laterality Date    APPENDECTOMY      CORONARY ANGIOPLASTY WITH STENT PLACEMENT      FOREARM SURGERY Right 2021    CLOSED  REDUCTION INTERNAL FIXATION RIGHT  DISTAL RADIUS PINNING performed by Rasheed Rodriguez MD at [address] Left 2012    PACEMAKER INSERTION      TONSILLECTOMY      WRIST FRACTURE SURGERY Left 2012       Immunization History:   Immunization History   Administered Date(s) Administered    Pneumococcal Conjugate 13-valent ([employer]) 2016    Tdap (Boostrix, Adacel) 2018    Zoster Live (Zostavax) 03/15/2016       Active Problems:  Patient Active Problem List   Diagnosis Code    Ischemic cardiomyopathy I25.5    CAD (coronary artery disease) I25.10    Hypertension I10    Pure hypercholesterolemia E78.00    Closed nondisp transvrs fracture of right patella with routine healing S82.034D    Diabetes mellitus ([facility] E11.9    Pacemaker Z95.0    Cardiomyopathy ([facility] I42.9    Closed fracture of right distal radius and ulna S52.501A, S52.601A    Closed nondisplaced transverse fracture of right patella with routine healing S82.034D    Type 2 diabetes mellitus with hyperosmolar hyperglycemic state (HHS) ([facility]) E11.00, E11.65       Isolation/Infection:   Isolation            Contact          Patient Infection Status Infection Onset Added Last Indicated Last Indicated By Review Planned Expiration Resolved Resolved By    ESBL (Extended Spectrum Beta Lactamase) 08/09/21 08/12/21 08/09/21 Culture, Urine        MDRO (multi-drug resistant organism) 08/09/21 08/12/21 08/09/21 Culture, Urine                Nurse Assessment:  Last Vital Signs: BP (!) 149/51   Pulse 79   Temp 97.3 °F (36.3 °C) (Oral)   Resp 16   Ht 5' 4\" (1.626 m)   Wt 100 lb (45.4 kg)   SpO2 96%   BMI 17.16 kg/m²     Last documented pain score (0-10 scale): Pain Level: 0  Last Weight:   Wt Readings from Last 1 Encounters:   08/09/21 100 lb (45.4 kg)     Mental Status:  oriented and alert    IV Access:  - PICC - site  R Basilic, insertion date: 8/12/21    Nursing Mobility/ADLs:  Walking   Assisted  Transfer  Assisted  Bathing  Assisted  Dressing  Assisted  Toileting  Assisted  Feeding  Independent  Med Admin  Assisted  Med Delivery   whole    Wound Care Documentation and Therapy:        Elimination:  Continence:   · Bowel: Yes  · Bladder: Yes  Urinary Catheter: None   Colostomy/Ileostomy/Ileal Conduit: No       Date of Last BM: 8/12/21  No intake or output data in the 24 hours ending 08/13/21 0827  No intake/output data recorded. Safety Concerns: At Risk for Falls    Impairments/Disabilities:      Hearing    Nutrition Therapy:  Current Nutrition Therapy:   - Oral Diet:  Carb Control 4 carbs/meal (1800kcals/day)    Routes of Feeding: Oral  Liquids: No Restrictions  Daily Fluid Restriction: no  Last Modified Barium Swallow with Video (Video Swallowing Test): not done    Treatments at the Time of Hospital Discharge:   Respiratory Treatments: none  Oxygen Therapy:  is not on home oxygen therapy.   Ventilator:    - No ventilator support    Rehab Therapies: Physical Therapy, Occupational Therapy and IV ATB  Weight Bearing Status/Restrictions: No weight bearing restirctions  Other Medical Equipment (for information only, NOT a DME order):  walker  Other Treatments: IV atb every 24 hours    Patient's personal belongings (please select all that are sent with patient):  Glasses, Hearing Aides bilateral    RN SIGNATURE:  Electronically signed by Gracie Ureña RN on 8/13/21 at 9:04 AM EDT    CASE MANAGEMENT/SOCIAL WORK SECTION    Inpatient Status Date: ***    Readmission Risk Assessment Score:  Readmission Risk              Risk of Unplanned Readmission:  18           Discharging to Facility/ Agency   · Name: [facility]  · Address: [address] [place] [address]  · Phone:  [phone]  · Fax:  [phone]    Dialysis Facility (if applicable)   · Name:  · Address:  · Dialysis Schedule:  · Phone:  · Fax:    / signature: Electronically signed by Humaira De La Garza. Lynn Jimenez on 8/13/21 at 11:14 AM EDT    PHYSICIAN SECTION    Prognosis: Good    Condition at Discharge: Stable    Rehab Potential (if transferring to Rehab): Good    Recommended Labs or Other Treatments After Discharge: UA in 1 week and the result is to be communicated to PCP prior to PICC line removal.  Weekly BMP to be drawn by home health care nurse and result is to be communicated to PCP for medications adjustment. Physician Certification: I certify the above information and transfer of Amirah Nevarez  is necessary for the continuing treatment of the diagnosis listed and that she requires Home Care for greater 30 days.      Update Admission H&P: No change in H&P    PHYSICIAN SIGNATURE:  Electronically signed by Gil Resendez MD on 8/13/21 at 8:27 AM EDT

## 2021-08-13 NOTE — DISCHARGE SUMMARY
Hospital Medicine Discharge Summary    Halima Cook  :  1928  MRN:  [id number]    Admit date:  2021  Discharge date:  2021    Admitting Physician:  Anirudh Ott MD  Primary Care Physician:  Fredy Mendez MD      Discharge Diagnoses:      *Hyperosmolar hyperglycemia. Andrey Estrada type 2 diabetes. Resolved.     *Acute kidney failure. Resolved. Patient has stage III kidney failure chronic.     *UTI.    ESBL Klebsiella UTI. Patient will be discharged home on intravenous Invanz 500 mg daily for 7 days through PICC line.     *Hypertension. Fair control.     *CAD with an elevated troponin.  The patient has paced rhythm.  No chest pain or palpitation.  No clinical evidence to suggest acute coronary syndrome or acute plaque rupture.  Her troponin is flat. Discussion took place between myself and her daughter. Comfort and conservative approach had been decided.     *Cachexia, frailty, muscle wasting, failure to thrive.     *Multiple other medical problems not listed above. Patient has multiple medical issues as listed above and others that are not listed. Patient is feeling great. She wants to go home. I do not have clear or strong clinical justification to extend inpatient hospitalization. Patient however would definitely need and require close and frequent monitoring as well as additional work-up, investigation and therapeutic intervention that potentially could take place in the outpatient setting by primary care doctor in collaboration with other specialists. That is to prevent relapse, decompensation, rehospitalization and other medical implications. To accomplish the aforementioned goals I requested home health care service. Home health care nurse will collaborate care post discharge with PCP. Discharge medications as listed on the discharge are not final or setting a stone.   These medications have to be titrated up and down to ensure that patient is in euvolemic state and that her electrolytes and bicarbonate levels are within normal range. Hospital Course:   Danna Garcia is a 80 y.o. female that was admitted and treated at [facility] for the aforementioned medical issues. Her hyperglycemia and acute kidney failure had resolved. Patient was found to have UTI. Culture came back positive for ESBL Klebsiella. Discussion took place between myself and her daughter as to the best way to handle this whether the patient should stay here in a skilled or go home and have it done through home health care. The patient did not qualify to stay here for skilled just for the intravenous antibiotic infusion. The plan is to discharge patient home with home health care. Intravenous Invanz daily for 7 days will be infused through a PICC line that was inserted yesterday. Patient is on sodium bicarbonate at home 650 3 times a day. Her sodium level as well as bicarbonate levels are normal.  I had to cut it down to a 325 3 times a day. I requested weekly BMP for 4 weeks essentially to monitor her sodium level, potassium, kidney function and decide whether she needs to be on higher or lower dose of sodium bicarbonate, higher lower dose of potassium supplementation. I requested the BMP resolved to be drawn by home health care nurse and the result is to be communicated to PCP for management after she leaves the hospital.      Patient was seen by the following consultants while admitted to [facility]:   Consults:  None    Significant Diagnostic Studies:    XR CHEST PORTABLE    Result Date: 8/10/2021  EXAMINATION: XR CHEST PORTABLE CLINICAL HISTORY: WEAKNESS COMPARISONS: JULY 11, 2016 FINDINGS: Pacemaker generator and wires unchanged. Osseous structures intact. Cardiopericardial silhouette normal. Aorta calcified. Lungs clear. NO ACUTE CARDIOPULMONARY DISEASE      Discharge Medications:    Please refer to discharge for information on discharge medications.       Disposition: Discharged to skilled Home. Any [facility] needs that were indicated and/or required as been addressed and set up by Social Work. Condition at discharge: Pt was medically stable at the time of discharge. Significant improvement in clinical condition compared to initial condition at presentation to hospital    Activity: activity as tolerated, fall precautions. Total time taken for discharging this patient: 40 minutes. Greater than 70% of time was spent focused exclusively on this patient. Time was taken to review chart, discuss plans with consultants, reconciling medications, discussing plan answering questions with patient. Aline De La Torre MD  8/13/2021, 8:25 AM  ----------------------------------------------------------------------------------------------------------------------    Raegan Shen,     Please return to ER or call 911 if you develop any significant signs or symptoms. I may not have addressed all of your medical illnesses or the abnormal blood work or imaging therefore please ask your PCP Noe Matta MD and other out patient specialists and providers  to obtain [facility] record entirely to follow up on all of the abnormal labs, imaging and findings that I have and have not addressed during your hospitalization. Discharging you from the hospital does not mean that your medical care ends here and now. You may still need additional work up, investigation, monitoring, and treatment to be handled from this point on by outside providers including your PCP, Noe Matta MD , Specialists and other healthcare providers. Please review your list of discharge medications prior to resuming medications you might still have at home, as the medications you need to be taking, dosages or how often you must take them may have changed.  For medication questions, contact your retail pharmacy and your PCP, Noe Matta MD .     ** I STRONGLY RECOMMEND that you follow up with Noe Matta MD AM  ----------------------------------------------------------------------------------------------------------------------    Sarahi Moore,     Please return to ER or call 911 if you develop any significant signs or symptoms. I may not have addressed all of your medical illnesses or the abnormal blood work or imaging therefore please ask your PCP Olive Sorensen MD and other out patient specialists and providers  to obtain [facility] record entirely to follow up on all of the abnormal labs, imaging and findings that I have and have not addressed during your hospitalization. Discharging you from the hospital does not mean that your medical care ends here and now. You may still need additional work up, investigation, monitoring, and treatment to be handled from this point on by outside providers including your PCP, Olive Sorensen MD , Specialists and other healthcare providers. Please review your list of discharge medications prior to resuming medications you might still have at home, as the medications you need to be taking, dosages or how often you must take them may have changed. For medication questions, contact your retail pharmacy and your PCP, Olive Sorensen MD .     ** I STRONGLY RECOMMEND that you follow up with Olive Sorensen MD within 3 to 5 days for a post hospitalization evaluation. This specific office visit is covered by your insurance, and is not the same as your annual doctor visit/ check up. This office visit is important, as it may prevent need for repeat and/or future hospitalizations. **    Your medical team at [facility] ([facility]) appreciates the opportunity to work with you to get well!     Sincerely,  Magalene Lundborg, MD

## 2021-08-13 NOTE — PROGRESS NOTES
Physical Therapy  Facility/Department: [facility] MED SURG UNIT  Daily Treatment Note  NAME: Kash Mayo  : 1928  MRN: [id number]    Date of Service: 2021    Discharge Recommendations:  Home with assist PRN, Home with Home health PT        Assessment   Body structures, Functions, Activity limitations: (P) Decreased functional mobility ; Decreased endurance;Decreased strength  Assessment: (P) Pt. performed and tolerated seated ther ex only this a.m. Pt. declined ambulation this a.m. HEP education for home to benefit with strength and quality of function. Treatment Diagnosis: (P) Hyperglycemia and weakness; UTI  REQUIRES PT FOLLOW UP: (P) Yes     Patient Diagnosis(es): The primary encounter diagnosis was Hyperglycemia. Diagnoses of General weakness, Elevated troponin, and Urinary tract infection without hematuria, site unspecified were also pertinent to this visit. has a past medical history of Anemia, Atrial fibrillation ([address] CAD (coronary artery disease), Cardiomyopathy ([address] CHF (congestive heart failure) ([address] Diabetes mellitus ([address] and Hypertension. has a past surgical history that includes Pacemaker insertion; Coronary angioplasty with stent; Hip fracture surgery (Left, 2012); Wrist fracture surgery (Left, 2012); Appendectomy; Tonsillectomy; and Forearm surgery (Right, 2021). Restrictions  Restrictions/Precautions  Restrictions/Precautions: (P) Fall Risk  Required Braces or Orthoses?: (P) No  Subjective              Orientation     Cognition      Objective         Ambulation  Ambulation?: (P) No (Declines. )        Exercises  Hip Flexion: (P) 2 x10'   Hip Abduction: (P) 2 x10'   Knee Long Arc Quad: (P) 2 x10'   Ankle Pumps: (P) 2 x10'   Core Strengthening: (P) Seated trunk flexion/ extension x10'   Comments: (P) Performed and educated HEP with seated ther ex for B LE and core strengthening to benefit with functional skills.                          G-Code     OutComes Score AM-PAC Score             Goals  Short term goals  Time Frame for Short term goals: (P) 3-5 days Med Surg  Short term goal 1: (P) Supervision with bed mobility  Short term goal 2: (P) Supervision with transfers  Short term goal 3: (P) Pt able to ambulate with AD num611'x 1 with CGA  Short term goal 4: (P) Pt able to be on her feet for >5 min before needing to rest due to fatigue  Short term goal 5: (P) Pt indep wht HEP  Long term goals  Time Frame for Long term goals : (P) Same as STGs  Patient Goals   Patient goals : (P) To get stronger    Plan    Plan  Times per week: (P) 5-7 days per week  Times per day: (P) Daily  Plan weeks: (P) 3-5 Med Surg  Current Treatment Recommendations: (P) Strengthening, Transfer Training, Endurance Training, Patient/Caregiver Education & Training, Pain Management, Balance Training, Home Exercise Program, Functional Mobility Training, Safety Education & Training, Gait Training  Safety Devices  Type of devices: (P) All fall risk precautions in place, Call light within reach, Chair alarm in place, Left in chair     Therapy Time   Individual Concurrent Group Co-treatment   Time In  1100         Time Out  1125         Minutes  [address] [place] [address], [facility]  License and [address] Number: (P) [phone]

## 2021-08-13 NOTE — PROGRESS NOTES
Please refer to the discharge summary that was completed today. For some reason the date on the discharge summary is off. The entire discharge summary and discharge process has been completed today.

## 2021-08-13 NOTE — PROGRESS NOTES
Pt is A&O x 3. Pleasant and cooperative with staff and care. Able to use call light or alert staff to make needs known. Denies pain and shows no s/s of distress or discomfort. Continues on IV Invanz with no adverse reactions noted. No further complaints voiced. Call light within reach. Safety maintained.

## 2021-08-17 ENCOUNTER — HOSPITAL ENCOUNTER (INPATIENT)
Age: 86
LOS: 1 days | Discharge: HOME HEALTH | DRG: 638 | End: 2021-08-19
Attending: EMERGENCY MEDICINE | Admitting: INTERNAL MEDICINE
Payer: MEDICARE

## 2021-08-17 DIAGNOSIS — N30.00 ACUTE CYSTITIS WITHOUT HEMATURIA: ICD-10-CM

## 2021-08-17 DIAGNOSIS — R73.9 HYPERGLYCEMIA: Primary | ICD-10-CM

## 2021-08-17 DIAGNOSIS — R77.8 ELEVATED TROPONIN: ICD-10-CM

## 2021-08-17 LAB
ALBUMIN SERPL-MCNC: 3.7 G/DL (ref 3.5–4.6)
ALP BLD-CCNC: 125 U/L (ref 40–130)
ALT SERPL-CCNC: 9 U/L (ref 0–33)
ANION GAP SERPL CALCULATED.3IONS-SCNC: 12 MEQ/L (ref 9–15)
AST SERPL-CCNC: 16 U/L (ref 0–35)
BASOPHILS ABSOLUTE: 0 K/UL (ref 0–0.1)
BASOPHILS RELATIVE PERCENT: 0.7 % (ref 0.1–1.2)
BETA-HYDROXYBUTYRATE: 2 MG/DL (ref 0.2–2.8)
BILIRUB SERPL-MCNC: 0.3 MG/DL (ref 0.2–0.7)
BILIRUBIN URINE: NEGATIVE
BLOOD, URINE: NEGATIVE
BUN BLDV-MCNC: 24 MG/DL (ref 8–23)
CALCIUM SERPL-MCNC: 9.9 MG/DL (ref 8.5–9.9)
CHLORIDE BLD-SCNC: 95 MEQ/L (ref 95–107)
CHP ED QC CHECK: NORMAL
CLARITY: CLEAR
CO2: 25 MEQ/L (ref 20–31)
COLOR: YELLOW
CREAT SERPL-MCNC: 1.32 MG/DL (ref 0.5–0.9)
EKG ATRIAL RATE: 72 BPM
EKG P AXIS: 74 DEGREES
EKG P-R INTERVAL: 192 MS
EKG Q-T INTERVAL: 474 MS
EKG QRS DURATION: 162 MS
EKG QTC CALCULATION (BAZETT): 519 MS
EKG R AXIS: -67 DEGREES
EKG T AXIS: 116 DEGREES
EKG VENTRICULAR RATE: 72 BPM
EOSINOPHILS ABSOLUTE: 0.1 K/UL (ref 0–0.4)
EOSINOPHILS RELATIVE PERCENT: 2.1 % (ref 0.7–5.8)
GFR AFRICAN AMERICAN: 45.4
GFR NON-AFRICAN AMERICAN: 37.5
GLOBULIN: 3.3 G/DL (ref 2.3–3.5)
GLUCOSE BLD-MCNC: 286 MG/DL (ref 60–115)
GLUCOSE BLD-MCNC: 457 MG/DL (ref 60–115)
GLUCOSE BLD-MCNC: 556 MG/DL
GLUCOSE BLD-MCNC: 556 MG/DL (ref 60–115)
GLUCOSE BLD-MCNC: 643 MG/DL (ref 70–99)
GLUCOSE BLD-MCNC: 89 MG/DL (ref 60–115)
GLUCOSE URINE: 500 MG/DL
HBA1C MFR BLD: 11.8 % (ref 4.8–5.9)
HCT VFR BLD CALC: 34.2 % (ref 37–47)
HEMOGLOBIN: 11 G/DL (ref 11.2–15.7)
IMMATURE GRANULOCYTES #: 0 K/UL
IMMATURE GRANULOCYTES %: 0.3 %
INR BLD: 1
KETONES, URINE: NEGATIVE MG/DL
LEUKOCYTE ESTERASE, URINE: NEGATIVE
LIPASE: 81 U/L (ref 12–95)
LYMPHOCYTES ABSOLUTE: 1.5 K/UL (ref 1.2–3.7)
LYMPHOCYTES RELATIVE PERCENT: 25.3 %
MCH RBC QN AUTO: 28.3 PG (ref 25.6–32.2)
MCHC RBC AUTO-ENTMCNC: 32.2 % (ref 32.2–35.5)
MCV RBC AUTO: 87.9 FL (ref 79.4–94.8)
MONOCYTES ABSOLUTE: 0.5 K/UL (ref 0.2–0.9)
MONOCYTES RELATIVE PERCENT: 9.2 % (ref 4.7–12.5)
NEUTROPHILS ABSOLUTE: 3.6 K/UL (ref 1.6–6.1)
NEUTROPHILS RELATIVE PERCENT: 62.4 % (ref 34–71.1)
NITRITE, URINE: NEGATIVE
PDW BLD-RTO: 13.6 % (ref 11.7–14.4)
PERFORMED ON: ABNORMAL
PERFORMED ON: NORMAL
PH UA: 6.5 (ref 5–9)
PLATELET # BLD: 168 K/UL (ref 182–369)
POTASSIUM SERPL-SCNC: 4.7 MEQ/L (ref 3.4–4.9)
PROTEIN UA: NEGATIVE MG/DL
PROTHROMBIN TIME: 13.1 SEC (ref 12.3–14.9)
RBC # BLD: 3.89 M/UL (ref 3.93–5.22)
SARS-COV-2, NAAT: NOT DETECTED
SODIUM BLD-SCNC: 132 MEQ/L (ref 135–144)
SPECIFIC GRAVITY UA: 1.01 (ref 1–1.03)
TOTAL PROTEIN: 7 G/DL (ref 6.3–8)
TROPONIN: 0.05 NG/ML (ref 0–0.01)
TROPONIN: 0.05 NG/ML (ref 0–0.01)
URINE REFLEX TO CULTURE: ABNORMAL
UROBILINOGEN, URINE: 0.2 E.U./DL
WBC # BLD: 5.8 K/UL (ref 4–10)

## 2021-08-17 PROCEDURE — 81003 URINALYSIS AUTO W/O SCOPE: CPT

## 2021-08-17 PROCEDURE — 93005 ELECTROCARDIOGRAM TRACING: CPT

## 2021-08-17 PROCEDURE — 93010 ELECTROCARDIOGRAM REPORT: CPT | Performed by: INTERNAL MEDICINE

## 2021-08-17 PROCEDURE — 85025 COMPLETE CBC W/AUTO DIFF WBC: CPT

## 2021-08-17 PROCEDURE — 6370000000 HC RX 637 (ALT 250 FOR IP): Performed by: INTERNAL MEDICINE

## 2021-08-17 PROCEDURE — 36592 COLLECT BLOOD FROM PICC: CPT

## 2021-08-17 PROCEDURE — 80053 COMPREHEN METABOLIC PANEL: CPT

## 2021-08-17 PROCEDURE — 85610 PROTHROMBIN TIME: CPT

## 2021-08-17 PROCEDURE — 2580000003 HC RX 258: Performed by: INTERNAL MEDICINE

## 2021-08-17 PROCEDURE — 96365 THER/PROPH/DIAG IV INF INIT: CPT

## 2021-08-17 PROCEDURE — 96372 THER/PROPH/DIAG INJ SC/IM: CPT

## 2021-08-17 PROCEDURE — 2580000003 HC RX 258: Performed by: EMERGENCY MEDICINE

## 2021-08-17 PROCEDURE — 83690 ASSAY OF LIPASE: CPT

## 2021-08-17 PROCEDURE — G0378 HOSPITAL OBSERVATION PER HR: HCPCS

## 2021-08-17 PROCEDURE — 83036 HEMOGLOBIN GLYCOSYLATED A1C: CPT

## 2021-08-17 PROCEDURE — 84484 ASSAY OF TROPONIN QUANT: CPT

## 2021-08-17 PROCEDURE — 99284 EMERGENCY DEPT VISIT MOD MDM: CPT

## 2021-08-17 PROCEDURE — 82010 KETONE BODYS QUAN: CPT

## 2021-08-17 PROCEDURE — 36415 COLL VENOUS BLD VENIPUNCTURE: CPT

## 2021-08-17 PROCEDURE — 6370000000 HC RX 637 (ALT 250 FOR IP): Performed by: EMERGENCY MEDICINE

## 2021-08-17 PROCEDURE — 87635 SARS-COV-2 COVID-19 AMP PRB: CPT

## 2021-08-17 PROCEDURE — 6360000002 HC RX W HCPCS: Performed by: EMERGENCY MEDICINE

## 2021-08-17 RX ORDER — DEXTROSE MONOHYDRATE 25 G/50ML
12.5 INJECTION, SOLUTION INTRAVENOUS PRN
Status: DISCONTINUED | OUTPATIENT
Start: 2021-08-17 | End: 2021-08-17

## 2021-08-17 RX ORDER — EZETIMIBE 10 MG/1
10 TABLET ORAL DAILY
Status: DISCONTINUED | OUTPATIENT
Start: 2021-08-17 | End: 2021-08-17

## 2021-08-17 RX ORDER — PAROXETINE HYDROCHLORIDE 20 MG/1
20 TABLET, FILM COATED ORAL NIGHTLY
Status: DISCONTINUED | OUTPATIENT
Start: 2021-08-17 | End: 2021-08-17

## 2021-08-17 RX ORDER — ONDANSETRON 2 MG/ML
4 INJECTION INTRAMUSCULAR; INTRAVENOUS EVERY 6 HOURS PRN
Status: DISCONTINUED | OUTPATIENT
Start: 2021-08-17 | End: 2021-08-19 | Stop reason: HOSPADM

## 2021-08-17 RX ORDER — DONEPEZIL HYDROCHLORIDE 5 MG/1
5 TABLET, FILM COATED ORAL NIGHTLY
Status: DISCONTINUED | OUTPATIENT
Start: 2021-08-17 | End: 2021-08-19 | Stop reason: HOSPADM

## 2021-08-17 RX ORDER — DONEPEZIL HYDROCHLORIDE 5 MG/1
5 TABLET, FILM COATED ORAL NIGHTLY
Status: DISCONTINUED | OUTPATIENT
Start: 2021-08-17 | End: 2021-08-17

## 2021-08-17 RX ORDER — SODIUM CHLORIDE 0.9 % (FLUSH) 0.9 %
3 SYRINGE (ML) INJECTION EVERY 8 HOURS
Status: DISCONTINUED | OUTPATIENT
Start: 2021-08-17 | End: 2021-08-17

## 2021-08-17 RX ORDER — SODIUM CHLORIDE 9 MG/ML
25 INJECTION, SOLUTION INTRAVENOUS PRN
Status: DISCONTINUED | OUTPATIENT
Start: 2021-08-17 | End: 2021-08-19 | Stop reason: HOSPADM

## 2021-08-17 RX ORDER — GABAPENTIN 100 MG/1
100 CAPSULE ORAL 2 TIMES DAILY
Status: DISCONTINUED | OUTPATIENT
Start: 2021-08-17 | End: 2021-08-17

## 2021-08-17 RX ORDER — SODIUM CHLORIDE 0.9 % (FLUSH) 0.9 %
5-40 SYRINGE (ML) INJECTION EVERY 12 HOURS SCHEDULED
Status: DISCONTINUED | OUTPATIENT
Start: 2021-08-17 | End: 2021-08-19 | Stop reason: HOSPADM

## 2021-08-17 RX ORDER — INSULIN GLARGINE 100 [IU]/ML
8 INJECTION, SOLUTION SUBCUTANEOUS 2 TIMES DAILY
Status: DISCONTINUED | OUTPATIENT
Start: 2021-08-17 | End: 2021-08-19 | Stop reason: HOSPADM

## 2021-08-17 RX ORDER — OXYCODONE HYDROCHLORIDE 5 MG/1
2.5 TABLET ORAL EVERY 4 HOURS PRN
Status: DISCONTINUED | OUTPATIENT
Start: 2021-08-17 | End: 2021-08-19 | Stop reason: HOSPADM

## 2021-08-17 RX ORDER — POLYETHYLENE GLYCOL 3350 17 G/17G
17 POWDER, FOR SOLUTION ORAL DAILY PRN
Status: DISCONTINUED | OUTPATIENT
Start: 2021-08-17 | End: 2021-08-17

## 2021-08-17 RX ORDER — ASPIRIN 81 MG/1
81 TABLET ORAL DAILY
Status: DISCONTINUED | OUTPATIENT
Start: 2021-08-17 | End: 2021-08-19 | Stop reason: HOSPADM

## 2021-08-17 RX ORDER — ONDANSETRON 2 MG/ML
4 INJECTION INTRAMUSCULAR; INTRAVENOUS EVERY 6 HOURS PRN
Status: DISCONTINUED | OUTPATIENT
Start: 2021-08-17 | End: 2021-08-17

## 2021-08-17 RX ORDER — CARVEDILOL 6.25 MG/1
3.12 TABLET ORAL 2 TIMES DAILY WITH MEALS
Status: DISCONTINUED | OUTPATIENT
Start: 2021-08-17 | End: 2021-08-17

## 2021-08-17 RX ORDER — CARVEDILOL 3.12 MG/1
3.12 TABLET ORAL 2 TIMES DAILY
Status: DISCONTINUED | OUTPATIENT
Start: 2021-08-17 | End: 2021-08-18

## 2021-08-17 RX ORDER — INSULIN GLARGINE 100 [IU]/ML
12 INJECTION, SOLUTION SUBCUTANEOUS NIGHTLY
Status: DISCONTINUED | OUTPATIENT
Start: 2021-08-17 | End: 2021-08-17

## 2021-08-17 RX ORDER — AMLODIPINE BESYLATE 2.5 MG/1
2.5 TABLET ORAL DAILY
Status: DISCONTINUED | OUTPATIENT
Start: 2021-08-17 | End: 2021-08-18

## 2021-08-17 RX ORDER — SODIUM CHLORIDE 0.9 % (FLUSH) 0.9 %
10 SYRINGE (ML) INJECTION EVERY 12 HOURS SCHEDULED
Status: DISCONTINUED | OUTPATIENT
Start: 2021-08-17 | End: 2021-08-17

## 2021-08-17 RX ORDER — ALOGLIPTIN 12.5 MG/1
6.25 TABLET, FILM COATED ORAL DAILY
Status: DISCONTINUED | OUTPATIENT
Start: 2021-08-17 | End: 2021-08-17

## 2021-08-17 RX ORDER — SODIUM CHLORIDE 0.9 % (FLUSH) 0.9 %
5-40 SYRINGE (ML) INJECTION PRN
Status: DISCONTINUED | OUTPATIENT
Start: 2021-08-17 | End: 2021-08-19 | Stop reason: HOSPADM

## 2021-08-17 RX ORDER — POLYETHYLENE GLYCOL 3350 17 G/17G
17 POWDER, FOR SOLUTION ORAL DAILY PRN
Status: DISCONTINUED | OUTPATIENT
Start: 2021-08-17 | End: 2021-08-19 | Stop reason: HOSPADM

## 2021-08-17 RX ORDER — ACETAMINOPHEN 650 MG/1
650 SUPPOSITORY RECTAL EVERY 6 HOURS PRN
Status: DISCONTINUED | OUTPATIENT
Start: 2021-08-17 | End: 2021-08-17

## 2021-08-17 RX ORDER — SODIUM CHLORIDE 9 MG/ML
INJECTION, SOLUTION INTRAVENOUS CONTINUOUS
Status: DISCONTINUED | OUTPATIENT
Start: 2021-08-17 | End: 2021-08-19

## 2021-08-17 RX ORDER — 0.9 % SODIUM CHLORIDE 0.9 %
1000 INTRAVENOUS SOLUTION INTRAVENOUS ONCE
Status: COMPLETED | OUTPATIENT
Start: 2021-08-17 | End: 2021-08-17

## 2021-08-17 RX ORDER — ACETAMINOPHEN 325 MG/1
650 TABLET ORAL EVERY 6 HOURS PRN
Status: DISCONTINUED | OUTPATIENT
Start: 2021-08-17 | End: 2021-08-17

## 2021-08-17 RX ORDER — AMLODIPINE BESYLATE 5 MG/1
2.5 TABLET ORAL DAILY
Status: DISCONTINUED | OUTPATIENT
Start: 2021-08-17 | End: 2021-08-17

## 2021-08-17 RX ORDER — ONDANSETRON 4 MG/1
4 TABLET, ORALLY DISINTEGRATING ORAL EVERY 8 HOURS PRN
Status: DISCONTINUED | OUTPATIENT
Start: 2021-08-17 | End: 2021-08-19 | Stop reason: HOSPADM

## 2021-08-17 RX ORDER — DEXTROSE MONOHYDRATE 50 MG/ML
100 INJECTION, SOLUTION INTRAVENOUS PRN
Status: DISCONTINUED | OUTPATIENT
Start: 2021-08-17 | End: 2021-08-17

## 2021-08-17 RX ORDER — PROMETHAZINE HYDROCHLORIDE 25 MG/1
12.5 TABLET ORAL EVERY 6 HOURS PRN
Status: DISCONTINUED | OUTPATIENT
Start: 2021-08-17 | End: 2021-08-17

## 2021-08-17 RX ORDER — NICOTINE POLACRILEX 4 MG
15 LOZENGE BUCCAL PRN
Status: DISCONTINUED | OUTPATIENT
Start: 2021-08-17 | End: 2021-08-17

## 2021-08-17 RX ORDER — INSULIN GLARGINE 100 [IU]/ML
12 INJECTION, SOLUTION SUBCUTANEOUS 2 TIMES DAILY
Status: DISCONTINUED | OUTPATIENT
Start: 2021-08-17 | End: 2021-08-17

## 2021-08-17 RX ORDER — ASPIRIN 81 MG/1
81 TABLET ORAL DAILY
Status: DISCONTINUED | OUTPATIENT
Start: 2021-08-17 | End: 2021-08-17

## 2021-08-17 RX ORDER — MIRTAZAPINE 15 MG/1
7.5 TABLET, FILM COATED ORAL NIGHTLY
Status: DISCONTINUED | OUTPATIENT
Start: 2021-08-17 | End: 2021-08-19 | Stop reason: HOSPADM

## 2021-08-17 RX ADMIN — AMLODIPINE BESYLATE 2.5 MG: 2.5 TABLET ORAL at 16:08

## 2021-08-17 RX ADMIN — OXYCODONE HYDROCHLORIDE 2.5 MG: 5 TABLET ORAL at 20:53

## 2021-08-17 RX ADMIN — SODIUM CHLORIDE 1000 ML: 9 INJECTION, SOLUTION INTRAVENOUS at 11:50

## 2021-08-17 RX ADMIN — Medication 3 ML: at 11:51

## 2021-08-17 RX ADMIN — MIRTAZAPINE 7.5 MG: 15 TABLET, FILM COATED ORAL at 20:53

## 2021-08-17 RX ADMIN — INSULIN GLARGINE 8 UNITS: 100 INJECTION, SOLUTION SUBCUTANEOUS at 21:31

## 2021-08-17 RX ADMIN — DONEPEZIL HYDROCHLORIDE 5 MG: 5 TABLET, FILM COATED ORAL at 20:54

## 2021-08-17 RX ADMIN — ERTAPENEM 500 MG: 1 INJECTION INTRAMUSCULAR; INTRAVENOUS at 13:17

## 2021-08-17 RX ADMIN — INSULIN HUMAN 10 UNITS: 100 INJECTION, SOLUTION PARENTERAL at 11:51

## 2021-08-17 RX ADMIN — CARVEDILOL 3.12 MG: 3.12 TABLET, FILM COATED ORAL at 20:54

## 2021-08-17 RX ADMIN — SODIUM CHLORIDE: 9 INJECTION, SOLUTION INTRAVENOUS at 16:08

## 2021-08-17 ASSESSMENT — PAIN SCALES - GENERAL
PAINLEVEL_OUTOF10: 0
PAINLEVEL_OUTOF10: 0
PAINLEVEL_OUTOF10: 6

## 2021-08-17 NOTE — ED NOTES
Supervisor assigned bed 212. Mackenzie LLOYD  (Waiting on admitting order).   Mackenzie Mock  08/17/21 [id number]

## 2021-08-17 NOTE — ED TRIAGE NOTES
Pt seen in ER about 1 week ago for UTI and sent home with ATB. Pt now at home for IV ATB administered by home health nurse. Today Pt BS was over 600 and nurse called family. Pt c/o feeling tired and weak upon arrival to ED, no c/o pain. Pt to ED 3 with family.  upon arrival.  Pt plan of care explained to Pt and family during bedside exam by Tali Scott.

## 2021-08-17 NOTE — ED PROVIDER NOTES
CC/HPI: 26-year-old female to the emergency department from home with chief complaint of blood sugar over 600. Patient history of insulin-dependent diabetes. Patient recently admitted to [facility] for hyperglycemia and UTI. Patient is receiving Invanz at home via IV for UTI. Family noted her blood sugars were in the upper 200s yesterday. Blood sugar over 600 today so brought to the emergency department. No fever no vomiting no chest pain or difficulty breathing. VITALS/PMH/PSH: Reviewed per nurses notes    REVIEW OF SYSTEMS: As in chief complaint history of present illness, otherwise all other systems are reviewed and negative the total 10 systems reviewed    PHYSICAL EXAM:  GEN: Pt alert and oriented, no acute distress  HEENT:         Normocephalic/Atramatic        PERRL, EOMI       Throat non-edematous. No erythema noted. No exudates noted. Mucous membranes pasty  NECK: Nontender, no signs of trauma, no lymphadenopathy  HEART: Reg S1/S2, without murmer, rub or gallop  LUNGS: Clear to auscultation bilaterally, respirations even and unlabored  ABDOMEN: Bowel sounds positive, soft, nondistended. Non-tender to palpation. No guarding rebound or rigidity  MUSCULOSKELETAL/EXTREMITITES:  No signs of trauma, cyanosis or edema. No CVA tenderness  LYMPH: no peripheral lympadenopathy noted  SKIN:  Warm & dry, no rash  NEUROLOGIC:  Alert and oriented. Speech clear    Medical decision making/ED course;  IV was established and lab work was obtained and reviewed. Patient with an initial blood sugar of 643. CMP was within normal limits other than sodium being low at 132 BUN and creatinine of 24 and 1.32 glucose of 643. Troponin was elevated 0.046 which is higher than what has been recently at 0.01-1.015. White blood cell count was 5.8 with an H&H of 11 and 34.2 Covid swab was negative. Urinalysis did not show signs of infection but 500 of glucose.  Patient was given IV fluids and IV dose of Invanz while in the emergency department and also 10 units of regular insulin subcu. With improvement of blood sugar into the 450s Case was discussed with Dr. Dung Barrios.     ER EKG interpretation -paced rhythm at 72 bpm with no obvious acute changes  Clinical impression;  1) poorly controlled diabetes  2) urinary tract infection  3) elevated troponin    Disposition/plan;   Patient been placed in observation at [facility] for further evaluation and treatment     Girish Odell DO  08/17/21 1503

## 2021-08-17 NOTE — PROGRESS NOTES
Pharmacy Dose Adjustment Per Protocol    Johanna Bojorquez is a 80 y.o. female. Recent Labs     08/17/21  1157   BUN 24*   CREATININE 1.32*   CrCl cannot be calculated (Unknown ideal weight.). CrCl is calculated to be 18.5 mL/min. Height: 5' 4\" (162.6 cm), Weight: 97 lb 1.6 oz (44 kg)    Drug Ordered Therapeutic Interchange (CrCL ? 30 mL/min)   [x] Enoxaparin 40 mg subq daily [x] Enoxaparin 30 mg subq daily   [] Enoxaparin 1 mg/kg subq BID [] Enoxaparin ________ (1 mg/kg) subq daily    [] Enoxaparin 30 subq BID [] Enoxaparin 30 mg subq daily     Enoxaparin decreased per protocol due to renal function. Pharmacy will continue to monitor renal function daily.      Thank you,    Zoila Robles, PharmD   8/17/2021 3:52 PM

## 2021-08-17 NOTE — ED NOTES
Dr. Zaki Edwards called back to Dr. Kacy Church.   Crystal Welch [facility]  08/17/21 3892

## 2021-08-17 NOTE — PROGRESS NOTES
Spoke with Carissa Adams, regarding code status. Messaged physician with updated DNR-CCA limited with no intubation, chest compressions okay.

## 2021-08-18 PROBLEM — E86.0 DEHYDRATION: Status: ACTIVE | Noted: 2021-08-18

## 2021-08-18 LAB
ANION GAP SERPL CALCULATED.3IONS-SCNC: 9 MEQ/L (ref 9–15)
BUN BLDV-MCNC: 27 MG/DL (ref 8–23)
CALCIUM SERPL-MCNC: 9.2 MG/DL (ref 8.5–9.9)
CHLORIDE BLD-SCNC: 105 MEQ/L (ref 95–107)
CO2: 24 MEQ/L (ref 20–31)
CREAT SERPL-MCNC: 1.5 MG/DL (ref 0.5–0.9)
GFR AFRICAN AMERICAN: 39.2
GFR NON-AFRICAN AMERICAN: 32.4
GLUCOSE BLD-MCNC: 185 MG/DL (ref 60–115)
GLUCOSE BLD-MCNC: 266 MG/DL (ref 60–115)
GLUCOSE BLD-MCNC: 298 MG/DL (ref 60–115)
GLUCOSE BLD-MCNC: 331 MG/DL (ref 70–99)
GLUCOSE BLD-MCNC: 391 MG/DL (ref 60–115)
GLUCOSE BLD-MCNC: 54 MG/DL (ref 60–115)
GLUCOSE BLD-MCNC: 82 MG/DL (ref 60–115)
HCT VFR BLD CALC: 31.7 % (ref 37–47)
HEMOGLOBIN: 10.2 G/DL (ref 11.2–15.7)
MCH RBC QN AUTO: 28.5 PG (ref 25.6–32.2)
MCHC RBC AUTO-ENTMCNC: 32.2 % (ref 32.2–35.5)
MCV RBC AUTO: 88.5 FL (ref 79.4–94.8)
PDW BLD-RTO: 13.6 % (ref 11.7–14.4)
PERFORMED ON: ABNORMAL
PERFORMED ON: NORMAL
PLATELET # BLD: 165 K/UL (ref 182–369)
POTASSIUM REFLEX MAGNESIUM: 3.8 MEQ/L (ref 3.4–4.9)
RBC # BLD: 3.58 M/UL (ref 3.93–5.22)
SODIUM BLD-SCNC: 138 MEQ/L (ref 135–144)
TROPONIN: 0.04 NG/ML (ref 0–0.01)
TROPONIN: 0.04 NG/ML (ref 0–0.01)
WBC # BLD: 4.9 K/UL (ref 4–10)

## 2021-08-18 PROCEDURE — 80048 BASIC METABOLIC PNL TOTAL CA: CPT

## 2021-08-18 PROCEDURE — 36415 COLL VENOUS BLD VENIPUNCTURE: CPT

## 2021-08-18 PROCEDURE — 6370000000 HC RX 637 (ALT 250 FOR IP): Performed by: INTERNAL MEDICINE

## 2021-08-18 PROCEDURE — 97535 SELF CARE MNGMENT TRAINING: CPT

## 2021-08-18 PROCEDURE — 36592 COLLECT BLOOD FROM PICC: CPT

## 2021-08-18 PROCEDURE — 6360000002 HC RX W HCPCS: Performed by: INTERNAL MEDICINE

## 2021-08-18 PROCEDURE — 97162 PT EVAL MOD COMPLEX 30 MIN: CPT

## 2021-08-18 PROCEDURE — 85027 COMPLETE CBC AUTOMATED: CPT

## 2021-08-18 PROCEDURE — 97530 THERAPEUTIC ACTIVITIES: CPT

## 2021-08-18 PROCEDURE — 84484 ASSAY OF TROPONIN QUANT: CPT

## 2021-08-18 PROCEDURE — 2580000003 HC RX 258: Performed by: INTERNAL MEDICINE

## 2021-08-18 PROCEDURE — 1210000000 HC MED SURG R&B

## 2021-08-18 PROCEDURE — 97166 OT EVAL MOD COMPLEX 45 MIN: CPT

## 2021-08-18 PROCEDURE — 97116 GAIT TRAINING THERAPY: CPT

## 2021-08-18 RX ORDER — DEXTROSE MONOHYDRATE 25 G/50ML
12.5 INJECTION, SOLUTION INTRAVENOUS PRN
Status: DISCONTINUED | OUTPATIENT
Start: 2021-08-18 | End: 2021-08-19 | Stop reason: HOSPADM

## 2021-08-18 RX ORDER — CARVEDILOL 6.25 MG/1
6.25 TABLET ORAL 2 TIMES DAILY
Status: DISCONTINUED | OUTPATIENT
Start: 2021-08-18 | End: 2021-08-19 | Stop reason: HOSPADM

## 2021-08-18 RX ORDER — DEXTROSE MONOHYDRATE 50 MG/ML
100 INJECTION, SOLUTION INTRAVENOUS PRN
Status: DISCONTINUED | OUTPATIENT
Start: 2021-08-18 | End: 2021-08-19 | Stop reason: HOSPADM

## 2021-08-18 RX ORDER — NICOTINE POLACRILEX 4 MG
15 LOZENGE BUCCAL PRN
Status: DISCONTINUED | OUTPATIENT
Start: 2021-08-18 | End: 2021-08-19 | Stop reason: HOSPADM

## 2021-08-18 RX ORDER — NITROGLYCERIN 0.4 MG/1
0.4 TABLET SUBLINGUAL EVERY 5 MIN PRN
Status: DISCONTINUED | OUTPATIENT
Start: 2021-08-18 | End: 2021-08-19 | Stop reason: HOSPADM

## 2021-08-18 RX ORDER — ISOSORBIDE MONONITRATE 30 MG/1
30 TABLET, EXTENDED RELEASE ORAL DAILY
Status: DISCONTINUED | OUTPATIENT
Start: 2021-08-18 | End: 2021-08-19 | Stop reason: HOSPADM

## 2021-08-18 RX ADMIN — ERTAPENEM 500 MG: 1 INJECTION INTRAMUSCULAR; INTRAVENOUS at 00:25

## 2021-08-18 RX ADMIN — ENOXAPARIN SODIUM 30 MG: 30 INJECTION, SOLUTION INTRAVENOUS; SUBCUTANEOUS at 08:04

## 2021-08-18 RX ADMIN — OXYCODONE HYDROCHLORIDE 2.5 MG: 5 TABLET ORAL at 09:39

## 2021-08-18 RX ADMIN — INSULIN GLARGINE 8 UNITS: 100 INJECTION, SOLUTION SUBCUTANEOUS at 20:37

## 2021-08-18 RX ADMIN — DONEPEZIL HYDROCHLORIDE 5 MG: 5 TABLET, FILM COATED ORAL at 20:37

## 2021-08-18 RX ADMIN — INSULIN GLARGINE 8 UNITS: 100 INJECTION, SOLUTION SUBCUTANEOUS at 08:36

## 2021-08-18 RX ADMIN — CARVEDILOL 6.25 MG: 6.25 TABLET, FILM COATED ORAL at 20:37

## 2021-08-18 RX ADMIN — SODIUM CHLORIDE: 9 INJECTION, SOLUTION INTRAVENOUS at 06:40

## 2021-08-18 RX ADMIN — MIRTAZAPINE 7.5 MG: 15 TABLET, FILM COATED ORAL at 20:37

## 2021-08-18 RX ADMIN — ISOSORBIDE MONONITRATE 30 MG: 30 TABLET, EXTENDED RELEASE ORAL at 11:34

## 2021-08-18 RX ADMIN — CARVEDILOL 3.12 MG: 3.12 TABLET, FILM COATED ORAL at 08:03

## 2021-08-18 RX ADMIN — AMLODIPINE BESYLATE 2.5 MG: 2.5 TABLET ORAL at 08:03

## 2021-08-18 RX ADMIN — ASPIRIN 81 MG: 81 TABLET, COATED ORAL at 08:03

## 2021-08-18 RX ADMIN — MAGNESIUM OXIDE 200 MG: 400 TABLET ORAL at 11:33

## 2021-08-18 ASSESSMENT — PAIN DESCRIPTION - DESCRIPTORS: DESCRIPTORS: CRAMPING;SORE

## 2021-08-18 ASSESSMENT — PAIN DESCRIPTION - ORIENTATION
ORIENTATION: RIGHT;LEFT
ORIENTATION: LEFT;RIGHT

## 2021-08-18 ASSESSMENT — PAIN SCALES - GENERAL
PAINLEVEL_OUTOF10: 8
PAINLEVEL_OUTOF10: 4
PAINLEVEL_OUTOF10: 0

## 2021-08-18 ASSESSMENT — PAIN DESCRIPTION - LOCATION
LOCATION: FOOT;LEG
LOCATION: LEG

## 2021-08-18 ASSESSMENT — PAIN SCALES - WONG BAKER: WONGBAKER_NUMERICALRESPONSE: 8

## 2021-08-18 NOTE — PROGRESS NOTES
Lab called with critical Trop levels but trending down.  Electronically signed by oLrrie June RN on 8/18/2021 at 6:34 AM Patent

## 2021-08-18 NOTE — PROGRESS NOTES
Stat troponin resulted, 0.046. Dr Trevin Velez and Dr Natalia Walton from [facility] notified.

## 2021-08-18 NOTE — PROGRESS NOTES
Physical Therapy    Medical Initial Assessment    NAME: Nancy Jewell  : 1928  MRN: [id number]    Date of Service: 2021    Patient Diagnosis(es): The primary encounter diagnosis was Hyperglycemia. Diagnoses of Elevated troponin and Acute cystitis without hematuria were also pertinent to this visit. has a past medical history of Anemia, Atrial fibrillation ([address] CAD (coronary artery disease), Cardiomyopathy ([address] CHF (congestive heart failure) ([address] Diabetes mellitus ([address] and Hypertension. has a past surgical history that includes Pacemaker insertion; Coronary angioplasty with stent; Hip fracture surgery (Left, 2012); Wrist fracture surgery (Left, 2012); Appendectomy; Tonsillectomy; and Forearm surgery (Right, 2021). Restrictions  Restrictions/Precautions  Restrictions/Precautions: Fall Risk (falls risk assumed, second admission this month from ER)  Vision/Hearing        Subjective  General  Chart Reviewed: Yes  Patient assessed for rehabilitation services?: Yes  Additional Pertinent Hx: [facility] hospital admission this month for same diagnmosis  Family / Caregiver Present: No  Referring Practitioner: Dr Merlyn Padilla  Referral Date : 21  Diagnosis: hyperglycemia, weakness  Follows Commands: Impaired ([place])  Subjective  Subjective: Pt reports getting cramps in her LE  Pain Screening  Patient Currently in Pain: Yes  Pain Assessment  Pain Assessment: Faces (mild to moderate leg cramps)  Pain Location: Leg  Pain Orientation: Left;Right  Pain Radiating Towards: lower legs and feet- has neuropathy if feet per pt report  Pain Descriptors: Cramping; Suzi ; Aching;Pressure  Vital Signs  Patient Currently in Pain: Yes  Pre Treatment Pain Screening  Pain at present:  (mild to moderate leg pain)  Intervention List: Patient able to continue with treatment  Comments / Details: pt would not delineate pain number    Orientation  Orientation  Overall Orientation Status: Within Functional Limits ([place],  x4 alert and oriented)    Social/Functional History  Social/Functional History  Lives With: Alone  Type of Home: House  Home Layout: Two level (stays on first floor, laundry in basement)  Home Access: Stairs to enter with rails  Entrance Stairs - Number of Steps: 3  Entrance Stairs - Rails:  (one unclear which side, pt [place])  Bathroom Shower/Tub: Walk-in shower, Shower chair with back  Bathroom Equipment: Shower chair, Hand-held shower, Grab bars in shower, Grab bars around toilet  Bathroom Accessibility: Walker accessible  Home Equipment: Cane, Rolling walker  Receives Help From: Family (daughter once a week and home therapy)  ADL Assistance:  (shower twice a week)  Ambulation Assistance: Independent (cane or ww if tired)  Leisure & Hobbies: sew  Objective     Observation/Palpation  Posture: Fair (flexed trunk)  Observation: Splint R wrist, telemetry monitor, glasses, hearing aides    AROM RLE (degrees)  RLE AROM: WFL  AROM LLE (degrees)  LLE AROM : WFL  Strength RLE  Comment: supine hip , knee and ankle >= 4/5  Strength LLE  Comment: supine hip , knee and ankle >= 4/5  Strength RUE  Comment: see OT eval  Strength LUE  Comment: see OT eval        Bed mobility  Rolling to Left: Supervision  Supine to Sit: Stand by assistance  Sit to Supine: Stand by assistance  Transfers  Sit to Stand: Contact guard assistance  Stand to sit: Contact guard assistance  Bed to Chair: Contact guard assistance  Comment: FWW min vc for safety  Ambulation  Ambulation?: Yes  More Ambulation?: Yes  Ambulation 1  Surface: level tile  Device: Rolling Walker  Assistance: Contact guard assistance  Quality of Gait: narrow ONOFRE, bno LOB, flexed posture  Distance: 60ft  Ambulation 2  Surface - 2: level tile  Device 2:  (handheld R to simulate cane use at home)  Assistance 2: Contact guard assistance;Minimal assistance  Quality of Gait 2: more unsteady with handheld to simulate cane, pt reports uses cane and furntiure at home  Distance: 60ft  Stairs/Curb  Stairs?: No (test when stornger)     Balance  Sitting - Static: Good  Sitting - Dynamic: Good;-  Standing - Static: Fair;+ (ww)  Standing - Dynamic: Fair (ww)  Exercises  Ankle Pumps: x20 reps     Assessment   Body structures, Functions, Activity limitations: Decreased functional mobility ; Decreased safe awareness;Decreased balance;Decreased endurance;Decreased strength;Decreased posture; Increased pain  Assessment: 89yof lives alone in 2 story home, stays on first floor, but goes to basement once a week to do laundry, Pt reports daughter helps once a week and does gorceries. Pt recently in [facility] 860 and [facility] 78 therapy and RN ordered. Pt appears to be dehydrated with cramping in BLE- LE pain less with walking and stretching- RN gave medicaiton to assit with LE pain c/o. Recommend 24 hour care as unclear if hydrating and remembering to eat. Pt would benefit from slow SNF PT and ot to gain funciton and strenght, may need placmeent post SNF dependnet upon progress or family abilty to provide more care and supervision if discharge to home. Treatment Diagnosis: difficulty wlaking, LE weakness with hyperglycemia  Prognosis: Fair (likely needs 20-24 hour care at home)  REQUIRES PT FOLLOW UP: Yes  Activity Tolerance  Activity Tolerance: Patient limited by fatigue;Patient limited by pain; Patient limited by endurance  Activity Tolerance: limited by [facility], extra time to give instruction and cues for safety     Discharge Recommendations:  [address], 24 hour supervision or assist      Plan   Plan  Times per week: 5-7x week  Times per day:  (1-2x per day)  Plan weeks: <1 medical pt , likelly needs apporx 2 weeks of SNF post medical admission.   Current Treatment Recommendations: Strengthening, Endurance Training, Transfer Training, Gait Training, Stair training, Functional Mobility Training, Balance Training, Pain Management, Home Exercise Program, Safety Education & Training, Positioning, Patient/Caregiver Education & Training, Modalities    G-Code          Goals  Short term goals  Time Frame for Short term goals: 3-5 days medical pt  Short term goal 1: tolerate 2x10 LE seated RICHARD to gain strength and endurance for funciton  Short term goal 2: amb >=80 ft with FWW  and CGA/min A befoe fatigued or LOB  Short term goal 3: assess 3 stairs towo rails with <= CGA and min vc for safety  Short term goal 4: Pt reporting any decrease in LE leg cramps with increasing walking  Short term goal 5: assist with safe discharge plan  Long term goals  Time Frame for Long term goals : same as [facility] medical pt  Patient Goals   Patient goals : \"I want to feel better and go home. Make the leg cramps better. \"       Therapy Time   Individual Concurrent Group Co-treatment   Time In  203         Time Out  945         Minutes  [address] [facility],[address], [id number]

## 2021-08-18 NOTE — CONSULTS
[address] [address]                                  CONSULTATION    PATIENT NAME: Liu Hope                     :        1928  MED REC NO:   [id number]                              ROOM:       0358  ACCOUNT NO:   [de-identified]                           ADMIT DATE: 2021  PROVIDER:     Dread Floyd MD    CONSULT DATE:  2021    REASON FOR CONSULTATION:  We were consulted because of elevated  troponins. HISTORY OF PRESENT ILLNESS:  This is a very pleasant patient who had  seen Dr. Miguel Jama and Dr. Jack Joe in the past.  The patient apparently came in  for some weakness and at that time, had some elevated troponins. The  troponin values were elevated to a small degree and in a flat pattern. She had a long history of elevated troponins dating back to well over  several months ago. However, they are a little bit more elevated than  before. Her initial troponin was 0.046; subsequent to that, the  patient's troponin was 0.046 and then subsequent to that, 0.042 and  presently is 0.036. As described above, the patient in the past has  elevated troponins in an area of 0.14, 0.16. There is not a CPK drawn at this particular time. Her COVID test was  negative. Of note is the fact this patient's glucose was in the 600 range. Glucose upon presentation was 643, BUN is 24, creatinine 1.32. Her  sodium was 132. The patient in general is a very pleasant 80year-old patient who  apparently lives by herself but is well attended by her family. However, apparently she eats intermittently. Dr. Estevan Sauceda notes noted  that she did have sodium as low as 115 on admission in 2021. The patient does have, however, a history of significant coronary  disease, undergone stenting in the past.  Last catheterization was  remotely, roughly 5 to 6 years ago.   At that time, her LV function was  described as being 40%, LAD had a 75% lesion. In the ostium, there was  a stent placed there and she did get a stent to the right coronary  artery as well at that time. The circumflex was okay. The patient in  general has been seen by Dr. Jack Joe and per her notes, discussed with the  family that she is a DNR-CCA. As described above, she had a recent  admission for hyponatremia which has since resolved. She had an  echocardiogram in 05/2021 which showed ejection fraction in neighborhood  of 50%, mild left atrial enlargement, mild tricuspid regurgitation with  an estimated RVSP being 13. Her creatinine at that time was 1.23;  presently, her creatinine is 1.32.    PHYSICAL EXAMINATION:  GENERAL:  She was examined briefly. She is hard of hearing. However,  flatly denies any chest pain. Denies any shortness of breath. Her main  pain is left leg pain and she is exercising as we speak. Her exam is  notable for generalized decreased breath sounds. She is quite kyphotic. CARDIAC:  She has a pacemaker known to the left upper chest wall. She  has got regular rate and rhythm, perhaps a faint murmur is noted. No S3  or S4 is noted. No rub is noted. LUNGS:  Coarse but clear. ABDOMEN:  Benign and she has no significant edema. LABORATORY DATA:  Her other laboratory work is notable for a lipase  which is normal.  She has a history of pro-time and INR which is normal.  She has got a urine culture which is pending. Obviously, it shows  elevated glucose. Hemoglobin A1c is 11.8 and the troponins are as described above. Her  CBC shows a white count of 5.9, hemoglobin 11.0 and 168,000 platelets. Her EKG is essentially paced. She has a history of pacemaker placement  for complete heart block.   This was done by Dr. Miguel Jama in the remote past.    Dr. Estevan Sauceda notes noticed that they do see this patient intermittently,  apparently according to family member who attended her last OV that they  do have a hard time getting her to accept enough nutrition. PAST MEDICAL HISTORY:  She has a history of cardiomyopathy in the past;  however, this is resolved by most recent echocardiogram of 2021;  diabetes; has a remote history of heart failure, but presently does not  appear to be in heart failure. She has a history hypertension,  hyperlipidemia, and apparently some atrial fibrillation, however, she is  not on chronic anticoagulation. The last pacemaker check did not show a  lot of atrial fibrillation. She has above-mentioned heart block. PAST SURGICAL HISTORY:  She has the above-mentioned stents,  above-mentioned pacemaker insertion. She has had wrist surgery, hip  surgery, appendectomy, tonsillectomy, forearm surgery. The patient did  have a traumatic fall in the not too distant past.    REVIEW OF SYSTEMS:  12-point review of systems is negative which is  outlined above, her main complaint now is left leg pain. As described  above, she denies any anginal symptoms or excessive shortness of breath. Chest x-ray seen and reviewed, shows pacemaker placement. No evidence  of significant heart failure is noted. ALLERGIES:  She has a history of allergy to ACETAMINOPHEN. Review of the patient's monitor shows maybe brief bit of irregular beat,  i.e., atrial fibrillation but is nonsustained, most of it looks to be  sinus mechanism with DDD pacemaker placement _____ tracings. EKG shows atrial tracking ventricular pacing. ASSESSMENT:  1. Elevated troponins, acute-on-chronic. This patient does not have  any anginal symptoms. Unfortunately, the patient's EKG is not helpful  given the pace. She does, however, have a history of coronary artery  disease. 2.  Her main issue is hyperglycemia. 3.  Per discussion of Dr. Mariia Olivera notes that the family and the  patient apparently are satisfied with the DNR-CCA status - obviously  then we will just do conservative care.   We will _____ medications  including platelet inhibitors, beta-blockers within the confines of  hypoglycemia, and nitrates. 4.  Lipids will be checked. 5.  She had some renal insufficiency and apparently does have some  intolerance and given her diabetes, one could consider some ACE  inhibitors, but we doubt one would interfere with her renal function at  this time. 6. In addition, we will get CPK-MB percent to see if there is any  significant insult and repeat echocardiogram as need be. PLAN:  1. As described above, conservative care. We will add Imdur, continue  low-dose beta blocker, the patient most likely has probably some element  of dehydration, so diuretics would not be appropriate. 2.  She is already on aspirin as a platelet inhibitor. We will get  hemoglobin and we will also check lipids and get CPK-MB percent. Further recommendations pending clinical course.         Muna Ornelas MD    D: 08/18/2021 11:20:46       T: 08/18/2021 11:28:29     [email or web address]  Job#: [id number]     Doc#: [id number]    CC:

## 2021-08-18 NOTE — PROGRESS NOTES
Nutrition Education    · Verbally reviewed information with Family & patient  · Educated on Consistent Carbohydrate counting for People with Diabetes. · Written educational materials provided. · Contact name and number provided. · Refer to Patient Education activity for more details.     Electronically signed by Kitty Lewis RD, LD on 8/18/21 at 2:51 PM EDT

## 2021-08-18 NOTE — PROGRESS NOTES
Chart reviewed. Collaborated with staff. Patient was admitted to [facility] under observation status on 8/17/21 with a diagnosis of hyperglycemia. Patient was reported to have changed to inpatient status on this date. Patient is known to this  from recent admission. Patient was evaluated by PT/OT and therapies recommending 24 supervision or assistance and recommending slower paced SNF with possible placement. This  met with patient, patient's daughter Chente Kirkpatrick with [facility] PTA [facility] present. Patient is very hard of hearing and asks for this  to repeat self at times. Patient's daughter Chente Kirkpatrick reports that patient resides at home alone and has been able to care for own ADLS. Chente Kirkpatrick reports only concern with patient at home is if patient is taking medication as ordered and eating properly. This  voiced safety concerns with patient being confused about taking medication. It is worth noting that patient has mini mental assessment arranged for 8/19 to assess cognition. Chente Kirkpatrick reports that she hired private care givers from 5pm-8pm through [facility] to be able to assist with dinner and check if medications are taken correctly. Chente Kirkpatrick goes on to report that patient is able to make own breakfast and that lunch is delivered to patient daily. Chente Kirkpatrick reports that patient was at a SNF for \"2 months\" and that goal is for patient to return home and be able to stay in own home as long as possible. Chente Kirkpatrick reports that she or daughter visit patient daily along with patient having [facility]. Chente Kirkpatrick reports that she does not want pateint to go to a nursing facility. Patient and Chente Kirkpatrick report that plan is for patient to return home upon DC from [facility] with [facility] to resume services, family checking in on patient and hired care givers.   SS to continue to follow

## 2021-08-18 NOTE — PROGRESS NOTES
Physical Therapy  Facility/Department: [facility] MED SURG UNIT  Daily Treatment Note  NAME: Vianca Guallpa  : 1928  MRN: [id number]    Date of Service: 2021    Discharge Recommendations:  Subacute/Skilled Nursing Facility, 24 hour supervision or assist        Assessment   Body structures, Functions, Activity limitations: Decreased functional mobility ; Decreased safe awareness;Decreased balance;Decreased endurance;Decreased strength;Decreased posture; Increased pain  Assessment: (P) Pt. urged to want to ambulate at arrival. Pt. tolerated well with ambulating several; distances without seated rest breaks. Pt. norma's occasional lateral stagger with gait pattern. Pt. able to self right to maintain balance. No complaints post gait training. I.V. pole follow needed for safety to reduce risk of incident during gait training. Forward kyphotiv trunk with pushing FWW. Attempted vc's to en courage keeping FWW close to self. Pt. appears hard of hearing and demo's minimal correction to bring FWW closer. However, pt. norma'd 0 LOB. Pt. satisfied post gait training and declined ther ex. Pt. norma's controlled sit to stand with use of FWW. However, pt. demo's decrease safe response to reach to sit before sitting. Treatment Diagnosis: difficulty wlaking, LE weakness with hyperglycemia  Prognosis: Fair  REQUIRES PT FOLLOW UP: Yes     Patient Diagnosis(es): The primary encounter diagnosis was Hyperglycemia. Diagnoses of Elevated troponin and Acute cystitis without hematuria were also pertinent to this visit. has a past medical history of Anemia, Atrial fibrillation ([facility] [address] CAD (coronary artery disease), Cardiomyopathy ([address] CHF (congestive heart failure) ([facility] [address] Diabetes mellitus ([facility] [address] and Hypertension. has a past surgical history that includes Pacemaker insertion; Coronary angioplasty with stent; Hip fracture surgery (Left, 2012); Wrist fracture surgery (Left, 2012); Appendectomy;  Tonsillectomy; and Forearm surgery (Right, 5/13/2021). Restrictions  Restrictions/Precautions  Restrictions/Precautions: (P) Fall Risk, Contact Precautions  Subjective   General  Chart Reviewed: Yes  Additional Pertinent Hx: recent hospital admission this month for same diagnmosis  Subjective  Subjective: (P) Pt. requests to ambulate only. Pt. reports legs hurt at times when I walk. No pain reported at rest.   General Comment  Comments: (P) Daughter present at arrival of therapy. Pt. [place]. Daughter reports bringing the pt. to hospital due to spike in sugar > 600 while at home. Daughter reports hiring evening aide from 5 pm to 8 pm to help assist pt. prn with meala and light cleaning. Daughter reports family in and out of house to visit daily. Orientation     Cognition      Objective      Transfers  Sit to Stand: (P) Contact guard assistance;Stand by assistance  Stand to sit: (P) Contact guard assistance;Stand by assistance  Comment: (P) Decrease respnse to reach to sit. Pt. able to demo controlled stand to sit with good self positioning with use of FWW. Ambulation  Ambulation?: Yes  Ambulation 1  Surface: level tile  Device: Rolling Walker  Assistance: Contact guard assistance  Quality of Gait: (P) narrow step width and length. Occasional stagger with lateral stepping. Pt. able to self right to maintain balance with FWW. Forward kyphotic trunk with pushing FWW. Distance: (P) 90'x4  Comments: (P) ocassional lateral stagger with pt. able to self right to maintain balance. Assistance needed for safety with I.V. pole follow. VC's attempted to bring FWW closer to self. Pt. Nods head with decrease response to correct. 0 LOB.       Balance  Sitting - Static: Good  Sitting - Dynamic: Good;-  Standing - Static: Fair;+  Standing - Dynamic: Fair  Exercises  Comments: (P) Declined ther ex                         G-Code     OutComes Score                                                     AM-PAC Score             Goals  Short term goals  Time Frame for Short term goals: 3-5 days medical pt  Short term goal 1: tolerate 2x10 LE seated RICHARD to gain strength and endurance for funciton  Short term goal 2: amb >=80 ft with FWW  and CGA/min A befoe fatigued or LOB  Short term goal 3: assess 3 stairs two rails with <= CGA and min vc for safety  Short term goal 4: Pt reporting any decrease in LE leg cramps with increasing walking  Short term goal 5: assist with safe discharge plan  Long term goals  Time Frame for Long term goals : same as [facility] medical pt  Patient Goals   Patient goals : \"I want to feel better and go home. Make the leg cramps better. \"    Plan    Plan  Times per week: 5-7x week  Times per day: Daily  Plan weeks: <1 medical pt , likelly needs apporx 2 weeks of SNF post medical admission.   Current Treatment Recommendations: Strengthening, Endurance Training, Transfer Training, Gait Training, Stair training, Functional Mobility Training, Balance Training, Pain Management, Home Exercise Program, Safety Education & Training, Positioning, Patient/Caregiver Education & Training, Modalities     Therapy Time   Individual Concurrent Group Co-treatment   Time In  300         Time Out  345         Minutes  Tim Mckeon 15, PTA  License and [address] Number: [phone]

## 2021-08-18 NOTE — H&P
77-year-old female with past medical history of A. fib, coronary disease, CHF, diabetes mellitus comes in to [facility] with a sugar of over 600. Patient is a insulin-dependent diabetic and admits that patient is compliant with home medications. Patient was recently discharged from [facility] for hyperglycemia and UTI patient was receiving Invanz at home for IV for UTI. Patient lives alone her daughter will visits the patient once a week. Patient has hard time hearing and has poor historian. Patient complains of arthritic joint pain in her knees and has difficulty ambulating. Upon evaluation patient noted to prone to falls. Past Medical History:   Diagnosis Date    Anemia     Atrial fibrillation ([address]     CAD (coronary artery disease)     Cardiomyopathy ([facility] [address]     CHF (congestive heart failure) ([address]     Diabetes mellitus ([facility] [address]     Hypertension      Past Surgical History:   Procedure Laterality Date    APPENDECTOMY      CORONARY ANGIOPLASTY WITH STENT PLACEMENT      FOREARM SURGERY Right 5/13/2021    CLOSED  REDUCTION INTERNAL FIXATION RIGHT  DISTAL RADIUS PINNING performed by Alize Holloway MD at [address] [place] [address] Left 12/2012    PACEMAKER INSERTION      TONSILLECTOMY      WRIST FRACTURE SURGERY Left 12/2012     Social History     Tobacco History     Smoking Status  Former Smoker    Smokeless Tobacco Use  Never Used          Alcohol History     Alcohol Use Status  Not Currently          Drug Use     Drug Use Status  Never          Sexual Activity     Sexually Active  Not Asked              History reviewed. No pertinent family history. No current facility-administered medications on file prior to encounter.      Current Outpatient Medications on File Prior to Encounter   Medication Sig Dispense Refill    aspirin 81 MG EC tablet Take 1 tablet by mouth daily 30 tablet 3    SITagliptin (JANUVIA) 100 MG tablet Take 0.5 tablets by mouth daily Take half a tablet daily  0  insulin glargine (LANTUS;BASAGLAR) 100 UNIT/ML injection pen Inject 12 Units into the skin nightly  0    sodium bicarbonate 325 MG tablet Take 1 tablet by mouth 2 times daily      glucose (GLUTOSE) 40 % GEL Take 37.5 mLs by mouth as needed (Low blood sugar less than 80) 45 g 2    amLODIPine (NORVASC) 2.5 MG tablet Take 1 tablet by mouth daily 30 tablet 3    potassium chloride (KLOR-CON M) 10 MEQ extended release tablet Take 1 tablet by mouth daily 60 tablet 3    ertapenem (INVANZ) infusion Infuse 500 mg intravenously every 24 hours for 7 days Compound per protocol.  3500 mg 0    mirtazapine (REMERON) 15 MG tablet Take 7.5 mg by mouth nightly       donepezil (ARICEPT) 5 MG tablet Take 5 mg by mouth nightly      carvedilol (COREG) 3.125 MG tablet 3.125 mg 2 times daily        Lab Results   Component Value Date    WBC 4.9 08/18/2021    HGB 10.2 (L) 08/18/2021    HCT 31.7 (L) 08/18/2021    MCV 88.5 08/18/2021     (L) 08/18/2021     Lab Results   Component Value Date     08/18/2021    K 3.8 08/18/2021     08/18/2021    CO2 24 08/18/2021    BUN 27 08/18/2021    CREATININE 1.50 08/18/2021    GLUCOSE 331 08/18/2021    GLUCOSE 74 06/08/2021    CALCIUM 9.2 08/18/2021      ROS: 12 point review of system is negative except was stated in HPI  HEENT: AT/NC, PERRLA, no JVD  HEART: s1/s2 wnl w/o s3  LUNG: clear, no wheez  ABD: soft, NT, ND, +BS  EXT: no edema  SKin : no rash  Neuro:no focal deficits, alert and orientated x 2  1) Dehydration  2) CHIARA on CKD 3  3) DM with hyperglycemia  4) functional immobility  5) HTN  admit to inpt  IVF  Finish course of abx  Pt/ot   for placement  No CP or SOB for CAD  Fall precautions  Repeat labs  Diabetic teaching given  Mini mental exam

## 2021-08-18 NOTE — PLAN OF CARE
Problem: Skin Integrity:  Goal: Will show no infection signs and symptoms  Description: Will show no infection signs and symptoms  Outcome: Met This Shift  Goal: Absence of new skin breakdown  Description: Absence of new skin breakdown  Outcome: Met This Shift     Problem: Pain:  Description: Pain management should include both nonpharmacologic and pharmacologic interventions.   Goal: Pain level will decrease  Description: Pain level will decrease  Outcome: Met This Shift  Goal: Control of acute pain  Description: Control of acute pain  Outcome: Met This Shift  Goal: Control of chronic pain  Description: Control of chronic pain  Outcome: Met This Shift

## 2021-08-18 NOTE — CONSULTS
Please see full consult dictated on August 18    HPI:  Patient brought in for weakness and severely elevated glucose  Elevated troponins, however they have been elevated chronically. However they are a little bit worse in terms of the number. The patient is hard of hearing however steadfastly denies any chest pain or shortness of breath  Patient does have a history of coronary disease, and had a heart catheterization roughly 6 to 7 years ago. At that time it was conservative care although she did have previous stent and moderate disease. Notes from Dr. Yamilet Richter as well as Dr. Albin Ortega show that conservative care desired by the patient and the patient's family-DNR CCA  History of pacemaker placement for complete heart block  Monitor shows mostly a sensed V paced  1 time she had reduced LV function, but most weeks echocardiogram of this year showed ejection fraction of 50% and no significant valvular pathology  Stage III renal insufficiency  No evidence of heart failure by exam as well as by chest x-ray-most likely was dehydrated due to hyperglycemia  Relatively recent admission for hyponatremia per Dr. Isabel Hu notes    Plan:  Conservative care given her clinicals status and wished to be DNR CCA  In addition, she has chronically elevated troponins although a little bit worse in terms of the numbers. No evidence of congestive heart failure-hold any diuretics  Maximal medical therapy will still be used.  We will add low-dose Imdur, gently increase the Coreg, and hold the Norvasc at this time to make sure that we do not have excessive hypotension  Continue baby aspirin  Follow lipids  Get CPK-MB percent to see if if there is any kind of dramatic ischemic changes  Further recommendations pending-the patient had a recent echocardiogram of this year so thus not necessary need to be repeated  Please call if any questions  Dr. Elieser العلي

## 2021-08-18 NOTE — PROGRESS NOTES
Occupational Therapy   Occupational Therapy Initial Assessment- Obs  Date: 2021   Patient Name: Shanna Gao  MRN: [id number]     : 1928    Date of Service: 2021    Discharge Recommendations:  24 hour supervision or assist (recommending slower pace SNF for therapy and possible placement)       Assessment   Performance deficits / Impairments: Decreased functional mobility ; Decreased ADL status; Decreased strength;Decreased safe awareness;Decreased endurance;Decreased balance;Decreased coordination  Assessment: Pt is a 81y/o female PLOF lives alone, has increasing need for assistance with ADL to ensure pt is cared for. Pt presents to [facility] for the 2nd time this mo 2* Hyperglycemia, acute cystitis without hematuria, elevated troponin. Pt demo's the above resulting perf def/impair and would benefit from OT skilled services to maximize strength/end and safety/I with ADL for safe d/c transition. Treatment Diagnosis: Hyperglycemia, acute cystitis without hematuria, elevated troponin  Prognosis: Good  History: multi comorb  Exam: 7 perf def/impair  OT Education: OT Role;Plan of Care;Transfer Training  REQUIRES OT FOLLOW UP: Yes  Safety Devices  Safety Devices in place: Yes  Type of devices: All fall risk precautions in place           Patient Diagnosis(es): The primary encounter diagnosis was Hyperglycemia. Diagnoses of Elevated troponin and Acute cystitis without hematuria were also pertinent to this visit. has a past medical history of Anemia, Atrial fibrillation ([address] CAD (coronary artery disease), Cardiomyopathy ([address] CHF (congestive heart failure) ([facility] Diabetes mellitus ([facility] [address] [facility] and Hypertension. has a past surgical history that includes Pacemaker insertion; Coronary angioplasty with stent; Hip fracture surgery (Left, 2012); Wrist fracture surgery (Left, 2012); Appendectomy; Tonsillectomy; and Forearm surgery (Right, 2021).     Treatment Diagnosis: Hyperglycemia, acute cystitis without hematuria, elevated troponin      Restrictions  Restrictions/Precautions  Restrictions/Precautions: Fall Risk (falls risk assumed, second admission this month from ER); Contact precautions (MDRO)    Subjective   General  Chart Reviewed: Yes  Patient assessed for rehabilitation services?: Yes  Family / Caregiver Present: No  Referring Practitioner: Dr. Pilar Hurley  Diagnosis: Hyperglycemia, acute cystitis without hematuria, elevated troponin  Subjective  Subjective: Pt agreeable to OT eval/tx  Patient Currently in Pain: Yes  Pain Assessment  Pain Assessment: Faces  Pain Level: 4  Guzmán-Baker Pain Rating: Hurts whole lot  Pain Location: Foot;Leg (\"They just hurt so bad\")  Pain Orientation: Right;Left  Pain Radiating Towards: lower legs and feet- has neuropathy if feet per pt report  Pain Descriptors: Cramping; Sore  Response to Pain Intervention: Drowsy; Patient Satisfied  Vital Signs  Patient Currently in Pain: Yes     Social/Functional History  Social/Functional History  Lives With: Alone  Type of Home: House  Home Layout: Two level (stays on first floor, laundry in basement)  Home Access: Stairs to enter with rails  Entrance Stairs - Number of Steps: 3  Entrance Stairs - Rails:  (one unclear which side, pt [place])  Bathroom Shower/Tub: Walk-in shower, Shower chair with back  [employer]: Shower chair, Hand-held shower, Grab bars in shower, Grab bars around toilet  Bathroom Accessibility: Walker accessible  Home Equipment: Cane, Rolling walker  Receives Help From: Family (daughter once a week and home therapy)  ADL Assistance:  (shower twice a week)  Ambulation Assistance: Independent (cane or ww if tired)  Occupation: Retired  Leisure & Hobbies: sew       Objective        Orientation  Overall Orientation Status: Within Functional Limits  Observation/Palpation  Posture: Fair (kyphotic)  Observation: Splint R wrist, telemetry monitor, glasses, hearing aides  Functional Mobility  Functional - Mobility Device: Rolling Walker  Activity: To/from bathroom; Other (level tile surface in hallway)  Assist Level: Contact guard assistance  ADL  Feeding: Modified independent   Grooming: Maximum assistance;Setup (Setup wash face, maxA wash and comb hair)  UE Bathing: Minimal assistance;Verbal cueing  LE Bathing: Minimal assistance  UE Dressing: Setup;Supervision  LE Dressing: Minimal assistance  Toileting: Minimal assistance  Tone RUE  RUE Tone: Normotonic  Tone LUE  LUE Tone: Normotonic  Coordination  Movements Are Fluid And Coordinated: Yes        Transfers  Sit to stand: Contact guard assistance  Stand to sit: Contact guard assistance     Cognition  Overall Cognitive Status: WFL                 LUE AROM (degrees)  LUE AROM : WFL  LUE General AROM: arthritic changes  Left Hand AROM (degrees)  Left Hand AROM: WFL  RUE AROM (degrees)  RUE AROM : WFL  Right Hand AROM (degrees)  Right Hand AROM: WFL  Right Hand General AROM: arthritic changes  LUE Strength  Gross LUE Strength: Exceptions to [facility] [place]  L Shoulder Flex: 3+/5  RUE Strength  Gross RUE Strength: Exceptions to [facility] [place]  R Shoulder Flex: 3+/5     Hand Dominance  Hand Dominance: Right             Plan   Plan  Times per week: 3-6x/wk  Times per day: Daily  Plan weeks: <1- Obs pt  Current Treatment Recommendations: Strengthening, ROM, Balance Training, Functional Mobility Training, Endurance Training, Pain Management, Safety Education & Training, Patient/Caregiver Education & Training, Self-Care / ADL          Goals  Short term goals  Time Frame for Short term goals: 1-3 days- Obs pt  Short term goal 1: Tolerate 20-25min BUE ther ex/act to inc strength/end for ADL  Short term goal 2: Inc to [facility] dressing and bathing  Short term goal 3: Inc to [facility] toileting  Long term goals  Time Frame for Long term goals : Same as STGs  Long term goal 1: Same as STGs  Long term goal 2: Same as STGs  Patient Goals   Patient goals : Welby Bones I go home now? \"       Therapy Time   Individual Concurrent Group Co-treatment Time In  11:00         Time Out  12:00         Minutes  [address] [facility]

## 2021-08-19 VITALS
HEIGHT: 64 IN | DIASTOLIC BLOOD PRESSURE: 64 MMHG | SYSTOLIC BLOOD PRESSURE: 154 MMHG | HEART RATE: 73 BPM | RESPIRATION RATE: 20 BRPM | OXYGEN SATURATION: 97 % | TEMPERATURE: 97.5 F | WEIGHT: 97.1 LBS | BODY MASS INDEX: 16.58 KG/M2

## 2021-08-19 LAB
ALBUMIN SERPL-MCNC: 3.2 G/DL (ref 3.5–4.6)
ALP BLD-CCNC: 96 U/L (ref 40–130)
ALT SERPL-CCNC: 9 U/L (ref 0–33)
ANION GAP SERPL CALCULATED.3IONS-SCNC: 10 MEQ/L (ref 9–15)
AST SERPL-CCNC: 18 U/L (ref 0–35)
BASOPHILS ABSOLUTE: 0.1 K/UL (ref 0–0.1)
BASOPHILS RELATIVE PERCENT: 1 % (ref 0.1–1.2)
BILIRUB SERPL-MCNC: <0.2 MG/DL (ref 0.2–0.7)
BUN BLDV-MCNC: 28 MG/DL (ref 8–23)
CALCIUM SERPL-MCNC: 9.2 MG/DL (ref 8.5–9.9)
CHLORIDE BLD-SCNC: 106 MEQ/L (ref 95–107)
CHOLESTEROL, TOTAL: 175 MG/DL (ref 0–199)
CK MB: 5.1 NG/ML (ref 0–3.8)
CO2: 24 MEQ/L (ref 20–31)
CREAT SERPL-MCNC: 1.31 MG/DL (ref 0.5–0.9)
CREATINE KINASE-MB INDEX: 7.8 % (ref 0–3.5)
EOSINOPHILS ABSOLUTE: 0.2 K/UL (ref 0–0.4)
EOSINOPHILS RELATIVE PERCENT: 3.5 % (ref 0.7–5.8)
GFR AFRICAN AMERICAN: 45.8
GFR NON-AFRICAN AMERICAN: 37.9
GLOBULIN: 2.7 G/DL (ref 2.3–3.5)
GLUCOSE BLD-MCNC: 137 MG/DL (ref 60–115)
GLUCOSE BLD-MCNC: 154 MG/DL (ref 70–99)
GLUCOSE BLD-MCNC: 223 MG/DL (ref 60–115)
HCT VFR BLD CALC: 30.5 % (ref 37–47)
HDLC SERPL-MCNC: 41 MG/DL (ref 40–59)
HEMOGLOBIN: 9.6 G/DL (ref 11.2–15.7)
IMMATURE GRANULOCYTES #: 0 K/UL
IMMATURE GRANULOCYTES %: 0.2 %
LDL CHOLESTEROL CALCULATED: 98 MG/DL (ref 0–129)
LYMPHOCYTES ABSOLUTE: 2.1 K/UL (ref 1.2–3.7)
LYMPHOCYTES RELATIVE PERCENT: 39.9 %
MAGNESIUM: 1.6 MG/DL (ref 1.7–2.4)
MCH RBC QN AUTO: 28.2 PG (ref 25.6–32.2)
MCHC RBC AUTO-ENTMCNC: 31.5 % (ref 32.2–35.5)
MCV RBC AUTO: 89.7 FL (ref 79.4–94.8)
MONOCYTES ABSOLUTE: 0.5 K/UL (ref 0.2–0.9)
MONOCYTES RELATIVE PERCENT: 9.9 % (ref 4.7–12.5)
NEUTROPHILS ABSOLUTE: 2.3 K/UL (ref 1.6–6.1)
NEUTROPHILS RELATIVE PERCENT: 45.5 % (ref 34–71.1)
PDW BLD-RTO: 13.6 % (ref 11.7–14.4)
PERFORMED ON: ABNORMAL
PERFORMED ON: ABNORMAL
PLATELET # BLD: 174 K/UL (ref 182–369)
POTASSIUM SERPL-SCNC: 3.9 MEQ/L (ref 3.4–4.9)
RBC # BLD: 3.4 M/UL (ref 3.93–5.22)
SODIUM BLD-SCNC: 140 MEQ/L (ref 135–144)
TOTAL CK: 65 U/L (ref 0–170)
TOTAL PROTEIN: 5.9 G/DL (ref 6.3–8)
TRIGL SERPL-MCNC: 180 MG/DL (ref 0–150)
TROPONIN: 0.04 NG/ML (ref 0–0.01)
WBC # BLD: 5.1 K/UL (ref 4–10)

## 2021-08-19 PROCEDURE — 97116 GAIT TRAINING THERAPY: CPT

## 2021-08-19 PROCEDURE — 97530 THERAPEUTIC ACTIVITIES: CPT

## 2021-08-19 PROCEDURE — 2580000003 HC RX 258: Performed by: INTERNAL MEDICINE

## 2021-08-19 PROCEDURE — 80061 LIPID PANEL: CPT

## 2021-08-19 PROCEDURE — 97535 SELF CARE MNGMENT TRAINING: CPT

## 2021-08-19 PROCEDURE — 83735 ASSAY OF MAGNESIUM: CPT

## 2021-08-19 PROCEDURE — 82550 ASSAY OF CK (CPK): CPT

## 2021-08-19 PROCEDURE — 6370000000 HC RX 637 (ALT 250 FOR IP): Performed by: INTERNAL MEDICINE

## 2021-08-19 PROCEDURE — 84484 ASSAY OF TROPONIN QUANT: CPT

## 2021-08-19 PROCEDURE — 6360000002 HC RX W HCPCS: Performed by: INTERNAL MEDICINE

## 2021-08-19 PROCEDURE — 82553 CREATINE MB FRACTION: CPT

## 2021-08-19 PROCEDURE — 80053 COMPREHEN METABOLIC PANEL: CPT

## 2021-08-19 PROCEDURE — 92523 SPEECH SOUND LANG COMPREHEN: CPT

## 2021-08-19 PROCEDURE — 97110 THERAPEUTIC EXERCISES: CPT

## 2021-08-19 PROCEDURE — 85025 COMPLETE CBC W/AUTO DIFF WBC: CPT

## 2021-08-19 RX ORDER — ISOSORBIDE MONONITRATE 30 MG/1
30 TABLET, EXTENDED RELEASE ORAL DAILY
Qty: 30 TABLET | Refills: 3 | Status: SHIPPED | OUTPATIENT
Start: 2021-08-20

## 2021-08-19 RX ORDER — INSULIN GLARGINE 100 [IU]/ML
8 INJECTION, SOLUTION SUBCUTANEOUS 2 TIMES DAILY
Qty: 1 VIAL | Refills: 3 | Status: ON HOLD | OUTPATIENT
Start: 2021-08-19 | End: 2022-08-31 | Stop reason: SDUPTHER

## 2021-08-19 RX ORDER — MAGNESIUM SULFATE 1 G/100ML
1000 INJECTION INTRAVENOUS ONCE
Status: COMPLETED | OUTPATIENT
Start: 2021-08-19 | End: 2021-08-19

## 2021-08-19 RX ORDER — CARVEDILOL 6.25 MG/1
6.25 TABLET ORAL 2 TIMES DAILY
Qty: 60 TABLET | Refills: 3 | Status: SHIPPED | OUTPATIENT
Start: 2021-08-19

## 2021-08-19 RX ADMIN — ERTAPENEM 500 MG: 1 INJECTION INTRAMUSCULAR; INTRAVENOUS at 10:30

## 2021-08-19 RX ADMIN — ENOXAPARIN SODIUM 30 MG: 30 INJECTION, SOLUTION INTRAVENOUS; SUBCUTANEOUS at 09:21

## 2021-08-19 RX ADMIN — ERTAPENEM 500 MG: 1 INJECTION INTRAMUSCULAR; INTRAVENOUS at 00:04

## 2021-08-19 RX ADMIN — CARVEDILOL 6.25 MG: 6.25 TABLET, FILM COATED ORAL at 09:22

## 2021-08-19 RX ADMIN — MAGNESIUM SULFATE HEPTAHYDRATE 1000 MG: 1 INJECTION, SOLUTION INTRAVENOUS at 09:31

## 2021-08-19 RX ADMIN — ASPIRIN 81 MG: 81 TABLET, COATED ORAL at 09:22

## 2021-08-19 RX ADMIN — SODIUM CHLORIDE: 9 INJECTION, SOLUTION INTRAVENOUS at 00:03

## 2021-08-19 RX ADMIN — INSULIN GLARGINE 8 UNITS: 100 INJECTION, SOLUTION SUBCUTANEOUS at 09:21

## 2021-08-19 RX ADMIN — MAGNESIUM OXIDE 200 MG: 400 TABLET ORAL at 09:22

## 2021-08-19 RX ADMIN — ISOSORBIDE MONONITRATE 30 MG: 30 TABLET, EXTENDED RELEASE ORAL at 09:22

## 2021-08-19 ASSESSMENT — PAIN SCALES - GENERAL: PAINLEVEL_OUTOF10: 0

## 2021-08-19 NOTE — DISCHARGE INSTR - COC
Continuity of Care Form    Patient Name: Jeane Dunn   :  1928  MRN:  [id number]    Admit date:  2021  Discharge date:  21    Code Status Order: DNR-CCA   Advance Directives:      Admitting Physician:  Vania Villeda MD  PCP: Shon Dorsey MD    Discharging Nurse: [facility] Unit/Room#: 0212/0212-01  Discharging Unit Phone Number: [phone]    Emergency Contact:   Extended Emergency Contact Information  Primary Emergency Contact: Tim May [address] Phone: [phone]  Mobile Phone: [phone]  Relation: Child    Past Surgical History:  Past Surgical History:   Procedure Laterality Date    APPENDECTOMY      CORONARY ANGIOPLASTY WITH STENT PLACEMENT      FOREARM SURGERY Right 2021    CLOSED  REDUCTION INTERNAL FIXATION RIGHT  DISTAL RADIUS PINNING performed by Elizabeth Madrid MD at [address] Left 2012    PACEMAKER INSERTION      TONSILLECTOMY      WRIST FRACTURE SURGERY Left 2012       Immunization History:   Immunization History   Administered Date(s) Administered    Pneumococcal Conjugate 13-valent (Maryluanath Hamilton) 2016    Tdap (Boostrix, Adacel) 2018    Zoster Live (Zostavax) 03/15/2016       Active Problems:  Patient Active Problem List   Diagnosis Code    Ischemic cardiomyopathy I25.5    CAD (coronary artery disease) I25.10    Hypertension I10    Pure hypercholesterolemia E78.00    Closed nondisp transvrs fracture of right patella with routine healing S82.034D    Diabetes mellitus ([address] E11.9    Pacemaker Z95.0    Cardiomyopathy ([address] I42.9    Closed fracture of right distal radius and ulna S52.501A, S52.601A    Closed nondisplaced transverse fracture of right patella with routine healing S82.034D    Type 2 diabetes mellitus with hyperosmolar hyperglycemic state (HHS) ([address] E11.00, E11.65    Hyperglycemia R73.9    Dehydration E86.0       Isolation/Infection:   Isolation Contact          Patient Infection Status       Infection Onset Added Last Indicated Last Indicated By Review Planned Expiration Resolved Resolved By    ESBL (Extended Spectrum Beta Lactamase) 08/09/21 08/12/21 08/09/21 Culture, Urine        MDRO (multi-drug resistant organism) 08/09/21 08/12/21 08/09/21 Culture, Urine        Resolved    COVID-19 Rule Out 08/17/21 08/17/21 08/17/21 COVID-19, Rapid (Ordered)   08/17/21 Rule-Out Test Resulted            Nurse Assessment:  Last Vital Signs: BP (!) 154/64   Pulse 73   Temp 97.5 °F (36.4 °C) (Oral)   Resp 20   Ht 5' 4\" (1.626 m)   Wt 97 lb 1.6 oz (44 kg)   SpO2 97%   BMI 16.67 kg/m²     Last documented pain score (0-10 scale): Pain Level: 0  Last Weight:   Wt Readings from Last 1 Encounters:   08/17/21 97 lb 1.6 oz (44 kg)     Mental Status:  oriented and alert    IV Access:  - None    Nursing Mobility/ADLs:  Walking   Assisted  Transfer  Assisted  Bathing  Assisted  Dressing  Assisted  Toileting  Assisted  Feeding  Independent  Med Admin  Assisted  Med Delivery   whole    Wound Care Documentation and Therapy:        Elimination:  Continence:   · Bowel: Yes  · Bladder: Yes  Urinary Catheter: None   Colostomy/Ileostomy/Ileal Conduit: No       Date of Last BM: 8/18/21  No intake or output data in the 24 hours ending 08/19/21 1002  No intake/output data recorded. Safety Concerns: At Risk for Falls and hyperglycemia and diabetes management    Impairments/Disabilities:      Hearing    Nutrition Therapy:  Current Nutrition Therapy:   - Oral Diet:  Carb Control 4 carbs/meal (1800kcals/day)    Routes of Feeding: Oral  Liquids: No Restrictions  Daily Fluid Restriction: no  Last Modified Barium Swallow with Video (Video Swallowing Test): not done    Treatments at the Time of Hospital Discharge:   Respiratory Treatments: selma  Oxygen Therapy:  is not on home oxygen therapy.   Ventilator:    - No ventilator support    Rehab Therapies: Physical Therapy and Occupational Therapy  Weight Bearing Status/Restrictions: No weight bearing restirctions  Other Medical Equipment (for information only, NOT a DME order):  cane  Other Treatments: none    Patient's personal belongings (please select all that are sent with patient):  Glasses, Hearing Aides bilateral    RN SIGNATURE:  Electronically signed by Gracie Ureña RN on 8/19/21 at 10:41 AM EDT    CASE MANAGEMENT/SOCIAL WORK SECTION    Inpatient Status Date: ***    Readmission Risk Assessment Score:  Readmission Risk              Risk of Unplanned Readmission:  24           Discharging to Facility/ Agency   · Name: [facility]  · Address: [address] [place] [address] Phone:  [phone]        Fax:      [phone]    Dialysis Facility (if applicable)   · Name:  · Address:  · Dialysis Schedule:  · Phone:  · Fax:    / signature: {Esignature:[id number]}    PHYSICIAN SECTION    Prognosis: Fair    Condition at Discharge: Stable    Rehab Potential (if transferring to Rehab): Good    Recommended Labs or Other Treatments After Discharge: none    Physician Certification: I certify the above information and transfer of Amirah Nevarez  is necessary for the continuing treatment of the diagnosis listed and that she requires Home Care for greater 30 days.      Update Admission H&P: No change in H&P    PHYSICIAN SIGNATURE:  Electronically signed by Elizabeth Marshall MD on 8/19/21 at 10:27 AM EDT

## 2021-08-19 NOTE — PROGRESS NOTES
Daughter Chente Kirkpatrick updated about pt's discharge today and that IV atb will be complete with PICC removal prior to DC. Chente Kirkpatrick will call when she has a time she will be able to transport pt because her  is having a heart cath today. Will continue to monitor.

## 2021-08-19 NOTE — PROGRESS NOTES
Speech Language Pathology  Facility/Department: [facility] MED SURG UNIT  Initial Speech/Language/Cognitive Assessment    NAME: Jeane Dunn  : 1928   MRN: [id number]  ADMISSION DATE: 2021  ADMITTING DIAGNOSIS: has Ischemic cardiomyopathy; CAD (coronary artery disease); Hypertension; Pure hypercholesterolemia; Closed nondisp transvrs fracture of right patella with routine healing; Diabetes mellitus ([facility] [address] [address] Pacemaker; Cardiomyopathy [facility]); Closed fracture of right distal radius and ulna; Closed nondisplaced transverse fracture of right patella with routine healing; Type 2 diabetes mellitus with hyperosmolar hyperglycemic state (HHS) ([facility] [address] Hyperglycemia; and Dehydration on their problem list.  DATE ONSET:     Date of Eval: 2021   Evaluating Therapist: MARIA ELENA Mesa    RECENT RESULTS  CT OF HEAD/MRI:    Primary Complaint:     Pain:  Pain Assessment  Pain Assessment: Faces  Pain Level: 4  Guzmán-Baker Pain Rating: Hurts whole lot  Patient's Stated Pain Goal: No pain  Pain Location: Foot, Leg (\"They just hurt so bad\")  Pain Orientation: Right, Left  Pain Radiating Towards: lower legs and feet- has neuropathy if feet per pt report  Pain Descriptors: Cramping, Sore  Response to Pain Intervention: Drowsy, Patient Satisfied    Assessment:  Cognitive Diagnosis: moderate cognitive deficits   Diagnosis: Patient scored a 16/30 on the MMSE, indicating moderate cognitive deficits. Patient was oriented to place, purpose, month and year, required cues for specific date. patient displayed deficits with delayed memory. patient displayed difficulty with calculations, however, suspect some difficulty also due to hearing deficits. Patient states she lives alone, daughter nearby to help, however, patient states she cooks and manages own medication. Patient admitted she forgets to take medication at times. patient was able to state she would use her life alert in case of an emergency.     Recommendations:  Requires SLP Intervention: Yes  Duration/Frequency of Treatment: 1-3x/wk          Plan:   Goals:  Short-term Goals  Goal 1: patient will improve problem solving for simple ADL situations to 80% accuracy to increase safety for homegoing  Goal 2: patient will improve short term memory with mild impairment. Long-term Goals  Goal 1: Patient will improve cognitive status to mild impairment to increase safety for homegoing. Patient/family involved in developing goals and treatment plan:     Subjective:   Previous level of function and limitations:                     Objective:                                             Cognition:      Orientation  Overall Orientation Status: Within Normal Limits  Memory  Memory: Exceptions to [facility] [place]  Short-term Memory: Mild  Numeric Reasoning  Numeric Reasoning: Exceptions to [facility]   Calculations:  Moderate  Abstract Reasoning  Abstract Reasoning: Exceptions to [facility]  Safety/Judgement  Safety/Judgement: Exceptions to [facility]    Additional Assessments:         16/30 on the MMSE    Prognosis:  Speech Therapy Prognosis  Prognosis: Fair  Individuals consulted  Consulted and agree with results and recommendations: Patient;RN    Education:             Therapy Time:   Individual Concurrent Group Co-treatment   Time In           Time Out           Minutes                   MARIA ELENA Peck  8/19/2021 8:00 AM

## 2021-08-19 NOTE — PROGRESS NOTES
Discharge instructions, medications, and f/u appts reviewed with daughter Mary Lou Arriaga and pt at bedside. Understanding verbalized. Pt taken to private transportation via wheelchair without incident.

## 2021-08-19 NOTE — PROGRESS NOTES
Occupational Therapy  Facility/Department: [facility] MED SURG UNIT  Daily Treatment Note  NAME: Mary Romano  : 1928  MRN: [id number]    Date of Service: 2021    Discharge Recommendations:  24 hour supervision or assist (recommending slower pace SNF for therapy and possible placement)       Assessment   Performance deficits / Impairments: Decreased functional mobility ; Decreased ADL status; Decreased strength;Decreased safe awareness;Decreased endurance;Decreased balance;Decreased coordination  Assessment: Pt had requested to use BR. Pt CGA with RW for fxl mob but needed max cuing to ensure she was not turning the wrong way/twisting around IV tubing. Pt was CGA toileting. Pt Eric LB dressing. Pt tolerated BUE ther ex with RBs and w/o c/o pain. Treatment Diagnosis: Hyperglycemia, acute cystitis without hematuria, elevated troponin  Prognosis: Good  REQUIRES OT FOLLOW UP: Yes  Safety Devices  Safety Devices in place: Yes  Type of devices: All fall risk precautions in place         Patient Diagnosis(es): The primary encounter diagnosis was Hyperglycemia. Diagnoses of Elevated troponin and Acute cystitis without hematuria were also pertinent to this visit. has a past medical history of Anemia, Atrial fibrillation ([address] CAD (coronary artery disease), Cardiomyopathy ([address] CHF (congestive heart failure) ([address] Diabetes mellitus ([address] and Hypertension. has a past surgical history that includes Pacemaker insertion; Coronary angioplasty with stent; Hip fracture surgery (Left, 2012); Wrist fracture surgery (Left, 2012); Appendectomy; Tonsillectomy; and Forearm surgery (Right, 2021).     Restrictions  Restrictions/Precautions  Restrictions/Precautions: Fall Risk, Contact Precautions (ESBL)     Subjective    General  Chart Reviewed: Yes  Patient assessed for rehabilitation services?: Yes  Family / Caregiver Present: No  Referring Practitioner: Dr. Abdirahman Meyer  Diagnosis: Hyperglycemia, acute cystitis without hematuria, elevated troponin  Subjective  Subjective: Pt requesting to use the restroom, was finished with her breakfast and agreeable to OT  Pain Assessment  Pain Assessment: 0-10  Pain Level: 0  Vital Signs  Patient Currently in Pain: No    Objective    ADL  Grooming: Contact guard assistance (stood at sink to wash hands)  UE Dressing: Setup;Supervision  LE Dressing: Minimal assistance  Toileting: Contact guard assistance        Functional Mobility  Functional - Mobility Device: Rolling Walker  Activity: To/from bathroom  Assist Level: Contact guard assistance  Functional Mobility Comments: therapist assist with IV pole, max cues for direction to not get tangled in IV tube  Bed mobility  Rolling to Left: Supervision  Supine to Sit: Stand by assistance  Sit to Supine: Stand by assistance  Transfers  Sit to stand: Contact guard assistance;Stand by assistance  Stand to sit: Contact guard assistance;Stand by assistance                    Exercises  Shoulder Flexion: 1x15 reps BUE for raises, forward rows, and backward rows  Horizontal ABduction: 1x15 reps BUE  Horizontal ADduction: 1x15 reps BUE  Elbow Flexion: 1x15 reps BUE  Elbow Extension: 1x15 reps BUE for chest presses  Wrist Flexion: 1x15 reps BUE  Wrist Extension: 1x15 reps BUE  Other: to inc strength/end for ADL (RB between each set)                Plan   Plan  Times per week: 3-6x/wk  Times per day: Daily  Plan weeks: <1- Obs pt  Current Treatment Recommendations: Strengthening, ROM, Balance Training, Functional Mobility Training, Endurance Training, Pain Management, Safety Education & Training, Patient/Caregiver Education & Training, Self-Care / ADL       Goals  Short term goals  Time Frame for Short term goals: 1-3 days- Obs pt  Short term goal 1: Tolerate 20-25min BUE ther ex/act to inc strength/end for ADL  Short term goal 2: Inc to [facility] LB dressing and bathing  Short term goal 3:  Inc to [facility] toileting  Long term goals  Time Frame for Long term goals : Same as STGs  Long term goal 1: Same as STGs  Long term goal 2: Same as STGs  Patient Goals   Patient goals : Daiva Carry I go home now? \"       Therapy Time   Individual Concurrent Group Co-treatment   Time In  8:20         Time Out  9:20         Minutes  [address] [place], [place]

## 2021-08-19 NOTE — DISCHARGE SUMMARY
Discharge Summary    Date: 8/19/2021  Patient Name: Domenic Crockett YOB: 1928 Age: 80 y.o. Admit Date: 8/17/2021  Discharge Date: 8/19/2021  Discharge Condition: [address]    Admission Diagnosis  Hyperglycemia (R73.9); Elevated troponin (R77.8); Acute cystitis without hematuria (N30.00); Dehydration (E86.0)     Discharge Diagnosis  Active Problems: Hyperglycemia DehydrationResolved Problems: * No resolved hospital problems. [facility] Stay  Narrative of Hospital Course:  Patient comes in hyperglycemia question of compliance. At home. Daughter and the POA wants her home with home health care. Patient was discharged home. Received IV fluids. Received Invanz. Patient has 2 more days of IV Invanz left. Was eval by cardiology for elevated troponin. No further work-up as per cardiology. Patient renal function improved with IV hydration. Consultants:  IP CONSULT TO CARDIOLOGYIP CONSULT TO SOCIAL WORK    Surgeries/procedures Performed:       Treatments:    Antibiotics    Meropenem    Discharge Plan/Disposition:  Home    Hospital/Incidental Findings Requiring Follow Up:    Patient Instructions:    Diet: Cardiac Diet    Activity:Activity as Tolerated  For number of days (if applicable): Other Instructions:    Provider Follow-Up:   No follow-ups on file. Significant Diagnostic Studies:    Recent Labs:  Admission on 08/17/2021Glucose                                       Date: 08/17/2021Value: 556         Ref range: mg/dL              Status: FinalQC OK?                                         Date: 08/17/2021Value: ok            Status: FinalSodium                                        Date: 08/17/2021Value: 132*        Ref range: 135 - 144 mEq/L    Status: FinalPotassium                                     Date: 08/17/2021Value: 4.7         Ref range: 3.4 - 4.9 mEq/L    Status: FinalChloride                                      Date: 08/17/2021Value: 95          Ref range: 95 - 107 mEq/L Date: 08/17/2021Value: 16          Ref range: 0 - 35 U/L         Status: FinalGlobulin                                      Date: 08/17/2021Value: 3.3         Ref range: 2.3 - 3.5 g/dL     Status: [address] [facility]                                           Date: 08/17/2021Value: 5.8         Ref range: 4.0 - 10.0 K/uL    Status: FinalRBC                                           Date: 08/17/2021Value: 3.89*       Ref range: 3.93 - 5.22 M/uL   Status: FinalHemoglobin                                    Date: 08/17/2021Value: 11.0*       Ref range: 11.2 - 15.7 g/dL   Status: FinalHematocrit                                    Date: 08/17/2021Value: 34.2*       Ref range: 37.0 - 47.0 %      Status: FinalMCV                                           Date: 08/17/2021Value: 87.9        Ref range: 79.4 - 94.8 fL     Status: [address]                                           Date: 08/17/2021Value: 28.3        Ref range: 25.6 - 32.2 pg     Status: [address]                                          Date: 08/17/2021Value: 32.2        Ref range: 32.2 - 35.5 %      Status: FinalRDW                                           Date: 08/17/2021Value: 13.6        Ref range: 11.7 - 14.4 %      Status: FinalPlatelets                                     Date: 08/17/2021Value: 168*        Ref range: 182 - 369 K/uL     Status: FinalNeutrophils %                                 Date: 08/17/2021Value: 62.4        Ref range: 34.0 - 71.1 %      Status: FinalImmature Granulocytes %                       Date: 08/17/2021Value: 0.3         Ref range: %                  Status: FinalLymphocytes %                                 Date: 08/17/2021Value: 25.3        Ref range: %                  Status: FinalMonocytes %                                   Date: 08/17/2021Value: 9.2         Ref range: 4.7 - 12.5 %       Status: FinalEosinophils %                                 Date: 08/17/2021Value: 2.1         Ref range: 0.7 - 5.8 %        Status: FinalBasophils %                                   Date: 08/17/2021Value: 0.7         Ref range: 0.1 - 1.2 %        Status: FinalNeutrophils Absolute                          Date: 08/17/2021Value: 3.6         Ref range: 1.6 - 6.1 K/uL     Status: FinalImmature Granulocytes #                       Date: 08/17/2021Value: 0.0         Ref range: K/uL               Status: FinalLymphocytes Absolute                          Date: 08/17/2021Value: 1.5         Ref range: 1.2 - 3.7 K/uL     Status: FinalMonocytes Absolute                            Date: 08/17/2021Value: 0.5         Ref range: 0.2 - 0.9 K/uL     Status: FinalEosinophils Absolute                          Date: 08/17/2021Value: 0.1         Ref range: 0.0 - 0.4 K/uL     Status: FinalBasophils Absolute                            Date: 08/17/2021Value: 0.0         Ref range: 0.0 - 0.1 K/uL     Status: FinalLipase                                        Date: 08/17/2021Value: 81          Ref range: 12 - 95 U/L        Status: FinalColor, UA                                     Date: 08/17/2021Value: Yellow      Ref range: Straw/Yellow       Status: FinalClarity, UA                                   Date: 08/17/2021Value: Clear       Ref range: Clear              Status: FinalGlucose, Ur                                   Date: 08/17/2021Value: 500*        Ref range: Negative mg/dL     Status: FinalBilirubin Urine                               Date: 08/17/2021Value: Negative    Ref range: Negative           Status: FinalKetones, Urine                                Date: 08/17/2021Value: Negative    Ref range: Negative mg/dL     Status: FinalSpecific Gravity, UA                          Date: 08/17/2021Value: 1.010       Ref range: 1.005 - 1.030      Status: FinalBlood, Urine                                  Date: 08/17/2021Value: Negative    Ref range: Negative           Status: FinalpH, UA                                        Date: 08/17/2021Value: 6.5         Ref range: 5.0 - 9.0          Status: FinalProtein, UA                                   Date: 08/17/2021Value: Negative    Ref range: Negative mg/dL     Status: FinalUrobilinogen, Urine                           Date: 08/17/2021Value: 0.2         Ref range: <2.0 E.U./dL       Status: FinalNitrite, Urine                                Date: 08/17/2021Value: Negative    Ref range: Negative           Status: FinalLeukocyte Esterase, Urine                     Date: 08/17/2021Value: Negative    Ref range: Negative           Status: FinalUrine Reflex to Culture                       Date: 08/17/2021Value: Not Indicated                     Status: FinalTroponin                                      Date: 08/17/2021Value: 0.046*      Ref range: 0.000 - 0.010 ng*  Status: Final              Comment: Methodology by Troponin T. Ventricular Rate                              Date: 08/17/2021Value: 72          Ref range: BPM                Status: FinalAtrial Rate                                   Date: 08/17/2021Value: 72          Ref range: BPM                Status: FinalP-R Interval                                  Date: 08/17/2021Value: 192         Ref range: ms                 Status: FinalQRS Duration                                  Date: 08/17/2021Value: 162         Ref range: ms                 Status: FinalQ-T Interval                                  Date: 08/17/2021Value: 474         Ref range: ms                 Status: FinalQTc Calculation (Bazett)                      Date: 08/17/2021Value: 519         Ref range: ms                 Status: FinalP Axis                                        Date: 08/17/2021Value: 74          Ref range: degrees            Status: FinalR Axis                                        Date: 08/17/2021Value: -67         Ref range: degrees            Status: FinalT Axis                                        Date: 08/17/2021Value: 116         Ref range: degrees Status: [facility]                                           Date: 08/18/2021Value: 4.9         Ref range: 4.0 - 10.0 K/uL    Status: FinalRBC                                           Date: 08/18/2021Value: 3.58*       Ref range: 3.93 - 5.22 M/uL   Status: FinalHemoglobin                                    Date: 08/18/2021Value: 10.2*       Ref range: 11.2 - 15.7 g/dL   Status: FinalHematocrit                                    Date: 08/18/2021Value: 31.7*       Ref range: 37.0 - 47.0 %      Status: FinalMCV                                           Date: 08/18/2021Value: 88.5        Ref range: 79.4 - 94.8 fL     Status: [address]                                           Date: 08/18/2021Value: 28.5        Ref range: 25.6 - 32.2 pg     Status: [address]                                          Date: 08/18/2021Value: 32.2        Ref range: 32.2 - 35.5 %      Status: FinalRDW                                           Date: 08/18/2021Value: 13.6        Ref range: 11.7 - 14.4 %      Status: FinalPlatelets                                     Date: 08/18/2021Value: 165*        Ref range: 182 - 369 K/uL     Status: FinalSodium                                        Date: 08/18/2021Value: 138         Ref range: 135 - 144 mEq/L    Status: FinalPotassium reflex Magnesium                    Date: 08/18/2021Value: 3.8         Ref range: 3.4 - 4.9 mEq/L    Status: FinalChloride                                      Date: 08/18/2021Value: 105         Ref range: 95 - 107 mEq/L     Status: FinalCO2                                           Date: 08/18/2021Value: 24          Ref range: 20 - 31 mEq/L      Status: FinalAnion Gap                                     Date: 08/18/2021Value: 9           Ref range: 9 - 15 mEq/L       Status: FinalGlucose                                       Date: 08/18/2021Value: 331*        Ref range: 70 - 99 mg/dL      Status: FinalBUN                                           Date: 08/18/2021Value: 27* Ref range: 8 - 23 mg/dL       Status: FinalCREATININE                                    Date: 08/18/2021Value: 1.50*       Ref range: 0.50 - 0.90 mg/dL  Status: FinalGFR Non-                      Date: 08/18/2021Value: 32.4*       Ref range: >60                Status: Final              Comment: >60 mL/min/1.73m2 EGFR, calc. for ages 25 and older using theMDRD formula (not corrected for weight), is valid for stablerenal function. GFR                           Date: 08/18/2021Value: 39.2*       Ref range: >60                Status: Final              Comment: >60 mL/min/1.73m2 EGFR, calc. for ages 25 and older using theMDRD formula (not corrected for weight), is valid for stablerenal function. Calcium                                       Date: 08/18/2021Value: 9.2         Ref range: 8.5 - 9.9 mg/dL    Status: FinalTroponin                                      Date: 08/17/2021Value: 0.046*      Ref range: 0.000 - 0.010 ng*  Status: Final              Comment: Methodology by Troponin T. POC Glucose                                   Date: 08/17/2021Value: 286*        Ref range: 60 - 115 mg/dl     Status: FinalPerformed on                                  Date: 08/17/2021Value: ACCU-CHEK     Status: FinalTroponin                                      Date: 08/18/2021Value: 0.042*      Ref range: 0.000 - 0.010 ng*  Status: Final              Comment: Methodology by Troponin T. Troponin                                      Date: 08/18/2021Value: 0.036*      Ref range: 0.000 - 0.010 ng*  Status: Final              Comment: Methodology by Troponin T. POC Glucose                                   Date: 08/18/2021Value: 391*        Ref range: 60 - 115 mg/dl     Status: FinalPerformed on                                  Date: 08/18/2021Value: ACCU-CHEK     Status: [address] Glucose                                   Date: 08/18/2021Value: 298*        Ref range: 60 - 115 mg/dl     Status: FinalPerformed on                                  Date: 08/18/2021Value: ACCU-CHEK     Status: Final              Comment: Notified RN or [address] Glucose                                   Date: 08/18/2021Value: 47*         Ref range: 60 - 115 mg/dl     Status: FinalPerformed on                                  Date: 08/18/2021Value: ACCU-CHEK     Status: [address] Glucose                                   Date: 08/18/2021Value: 82          Ref range: 60 - 115 mg/dl     Status: FinalPerformed on                                  Date: 08/18/2021Value: ACCU-CHEK     Status: [address] Glucose                                   Date: 08/18/2021Value: 185*        Ref range: 60 - 115 mg/dl     Status: FinalPerformed on                                  Date: 08/18/2021Value: ACCU-CHEK     Status: [facility]                                           Date: 08/19/2021Value: 5.1         Ref range: 4.0 - 10.0 K/uL    Status: FinalRBC                                           Date: 08/19/2021Value: 3.40*       Ref range: 3.93 - 5.22 M/uL   Status: FinalHemoglobin                                    Date: 08/19/2021Value: 9.6*        Ref range: 11.2 - 15.7 g/dL   Status: FinalHematocrit                                    Date: 08/19/2021Value: 30.5*       Ref range: 37.0 - 47.0 %      Status: FinalMCV                                           Date: 08/19/2021Value: 89.7        Ref range: 79.4 - 94.8 fL     Status: [address]                                           Date: 08/19/2021Value: 28.2        Ref range: 25.6 - 32.2 pg     Status: [address]                                          Date: 08/19/2021Value: 31.5*       Ref range: 32.2 - 35.5 %      Status: FinalRDW                                           Date: 08/19/2021Value: 13.6        Ref range: 11.7 - 14.4 %      Status: FinalPlatelets                                     Date: 08/19/2021Value: 174*        Ref range: 182 - 369 K/uL     Status: FinalNeutrophils % Date: 08/19/2021Value: 45.5        Ref range: 34.0 - 71.1 %      Status: FinalImmature Granulocytes %                       Date: 08/19/2021Value: 0.2         Ref range: %                  Status: FinalLymphocytes %                                 Date: 08/19/2021Value: 39.9        Ref range: %                  Status: FinalMonocytes %                                   Date: 08/19/2021Value: 9.9         Ref range: 4.7 - 12.5 %       Status: FinalEosinophils %                                 Date: 08/19/2021Value: 3.5         Ref range: 0.7 - 5.8 %        Status: FinalBasophils %                                   Date: 08/19/2021Value: 1.0         Ref range: 0.1 - 1.2 %        Status: FinalNeutrophils Absolute                          Date: 08/19/2021Value: 2.3         Ref range: 1.6 - 6.1 K/uL     Status: FinalImmature Granulocytes #                       Date: 08/19/2021Value: 0.0         Ref range: K/uL               Status: FinalLymphocytes Absolute                          Date: 08/19/2021Value: 2.1         Ref range: 1.2 - 3.7 K/uL     Status: FinalMonocytes Absolute                            Date: 08/19/2021Value: 0.5         Ref range: 0.2 - 0.9 K/uL     Status: FinalEosinophils Absolute                          Date: 08/19/2021Value: 0.2         Ref range: 0.0 - 0.4 K/uL     Status: FinalBasophils Absolute                            Date: 08/19/2021Value: 0.1         Ref range: 0.0 - 0.1 K/uL     Status: FinalSodium                                        Date: 08/19/2021Value: 140         Ref range: 135 - 144 mEq/L    Status: FinalPotassium                                     Date: 08/19/2021Value: 3.9         Ref range: 3.4 - 4.9 mEq/L    Status: FinalChloride                                      Date: 08/19/2021Value: 106         Ref range: 95 - 107 mEq/L     Status: FinalCO2                                           Date: 08/19/2021Value: 24          Ref range: 20 - 31 mEq/L      Status: FinalAnion Gap                                     Date: 08/19/2021Value: 10          Ref range: 9 - 15 mEq/L       Status: FinalGlucose                                       Date: 08/19/2021Value: 154*        Ref range: 70 - 99 mg/dL      Status: FinalBUN                                           Date: 08/19/2021Value: 28*         Ref range: 8 - 23 mg/dL       Status: FinalCREATININE                                    Date: 08/19/2021Value: 1.31*       Ref range: 0.50 - 0.90 mg/dL  Status: FinalGFR Non-                      Date: 08/19/2021Value: 37.9*       Ref range: >60                Status: Final              Comment: >60 mL/min/1.73m2 EGFR, calc. for ages 25 and older using theMDRD formula (not corrected for weight), is valid for stablerenal function. GFR                           Date: 08/19/2021Value: 45.8*       Ref range: >60                Status: Final              Comment: >60 mL/min/1.73m2 EGFR, calc. for ages 25 and older using theMDRD formula (not corrected for weight), is valid for stablerenal function. Calcium                                       Date: 08/19/2021Value: 9.2         Ref range: 8.5 - 9.9 mg/dL    Status: FinalTotal Protein                                 Date: 08/19/2021Value: 5.9*        Ref range: 6.3 - 8.0 g/dL     Status: FinalAlbumin                                       Date: 08/19/2021Value: 3.2*        Ref range: 3.5 - 4.6 g/dL     Status: FinalTotal Bilirubin                               Date: 08/19/2021Value: <0.2        Ref range: 0.2 - 0.7 mg/dL    Status: FinalAlkaline Phosphatase                          Date: 08/19/2021Value: 96          Ref range: 40 - 130 U/L       Status: FinalALT                                           Date: 08/19/2021Value: 9           Ref range: 0 - 33 U/L         Status: FinalAST                                           Date: 08/19/2021Value: 18          Ref range: 0 - 35 U/L         Status: FinalGlobulin Date: 08/19/2021Value: 2.7         Ref range: 2.3 - 3.5 g/dL     Status: FinalTroponin                                      Date: 08/19/2021Value: 0.036*      Ref range: 0.000 - 0.010 ng*  Status: Final              Comment: Methodology by Troponin T. Total CK                                      Date: 08/19/2021Value: 65          Ref range: 0 - 170 U/L        Status: FinalMagnesium                                     Date: 08/19/2021Value: 1.6*        Ref range: 1.7 - 2.4 mg/dL    Status: FinalPOC Glucose                                   Date: 08/18/2021Value: 266*        Ref range: 60 - 115 mg/dl     Status: FinalPerformed on                                  Date: 08/18/2021Value: ACCU-CHEK     Status: [address] Glucose                                   Date: 08/19/2021Value: 137*        Ref range: 60 - 115 mg/dl     Status: FinalPerformed on                                  Date: 08/19/2021Value: ACCU-CHEK     Status: Final------------    Radiology last 7 days:  No results found.      Pending Labs   Order Current Status  Lipid Panel In process  CK-MB Index Preliminary result      Discharge Medications    Current Discharge Medication ListSTART taking these medicationsisosorbide mononitrate (IMDUR) 30 MG extended release tabletTake 1 tablet by mouth dailyQty: 30 tablet Refills: 3    Current Discharge Medication ListCONTINUE these medications which have CHANGEDinsulin glargine (LANTUS) 100 UNIT/ML injection vialInject 8 Units into the skin 2 times dailyQty: 1 vial Refills: 3carvedilol (COREG) 6.25 MG tabletTake 1 tablet by mouth 2 times dailyQty: 60 tablet Refills: 3    Current Discharge Medication ListCONTINUE these medications which have NOT CHANGEDaspirin 81 MG EC tabletTake 1 tablet by mouth dailyQty: 30 tablet Refills: 3SITagliptin (JANUVIA) 100 MG tabletTake 0.5 tablets by mouth daily Take half a tablet dailyRefills: 0glucose (GLUTOSE) 40 % GELTake 37.5 mLs by mouth as needed (Low blood sugar less than 80)Qty: 45 g Refills: 2potassium chloride (KLOR-CON M) 10 MEQ extended release tabletTake 1 tablet by mouth dailyQty: 60 tablet Refills: 3ertapenem (INVANZ) infusionInfuse 500 mg intravenously every 24 hours for 7 days Compound per protocol. Qty: 3500 mg Refills: 0mirtazapine (REMERON) 15 MG tabletTake 7.5 mg by mouth nightly donepezil (ARICEPT) 5 MG tabletTake 5 mg by mouth nightly    Current Discharge Medication ListSTOP taking these medicationssodium bicarbonate 325 MG tabletComments:Reason for Stopping:amLODIPine (NORVASC) 2.5 MG tabletComments:Reason for Stopping:    Time Spent on Discharge:E] minutes were spent in patient examination, evaluation, counseling as well as medication reconciliation, prescriptions for required medications, discharge plan, and follow up.     Electronically signed by Gauri Juarez MD on 8/19/21 at 9:48 AM EDT   overtime on dc summary was 45 min

## 2021-08-19 NOTE — PROGRESS NOTES
Physical Therapy  Facility/Department: [facility] MED SURG UNIT  Daily Treatment Note  NAME: Rodrigo Ortiz  : 1928  MRN: [id number]    Date of Service: 2021    Discharge Recommendations:  (P) 24 hour supervision or assist        Assessment Pt is awake and alert, required assist with toileting upon arrival, agreeable to gait training afterwards- pt requested to use personal SBQC vs FWW, assist provided for stability and to manage IV pole. Issued and reviewed pictoral copy of HEP as pt is going to d/c home later today per nurse- pt is receptive to instruction, however declined active participation with LE exercises. Pt is very [place], does better with reading lips, visual cues. Pt can be impulsive- pt sitting in recliner at end of session, no pain noted. REQUIRES PT FOLLOW UP: (P) Yes  Activity Tolerance: (P) Patient limited by endurance     Patient Diagnosis(es): The primary encounter diagnosis was Hyperglycemia. Diagnoses of Elevated troponin and Acute cystitis without hematuria were also pertinent to this visit. has a past medical history of Anemia, Atrial fibrillation ([address] CAD (coronary artery disease), Cardiomyopathy ([address] CHF (congestive heart failure) ([address] Diabetes mellitus ([address] and Hypertension. has a past surgical history that includes Pacemaker insertion; Coronary angioplasty with stent; Hip fracture surgery (Left, 2012); Wrist fracture surgery (Left, 2012); Appendectomy; Tonsillectomy; and Forearm surgery (Right, 2021). Restrictions  Restrictions/Precautions  Restrictions/Precautions: (P) Fall Risk, Contact Precautions  Subjective   General  Chart Reviewed: (P) Yes  Additional Pertinent Hx: (P) recent hospital admission this month for same diagnmosis  Family / Caregiver Present: (P) No  Referring Practitioner: (P) Dr Amalia Alva  Subjective: (P) Pt is awake, sitting up in recliner. IV intact, running, also noted wires from heart monitor.    Comments: (P) \"I have to go to the bathroom\".    Pain Screening  Patient Currently in Pain: (P) Denies  Pre Treatment Pain Screening  Pain at present: (P) 0  Intervention List: (P) Patient able to continue with treatment    Orientation     Cognition      Objective      Transfers  Sit to Stand: (P) Contact guard assistance;Stand by assistance  Stand to sit: (P) Stand by assistance;Supervision  Stand Pivot Transfers: (P) Contact guard assistance;Stand by assistance  Comment: (P) poor safety, can be impulsive, difficulty with cues/instruction d/t hearing loss  Ambulation  Ambulation?: (P) Yes  Ambulation 1  Surface: (P) level tile  Device: (P) [facility]  Assistance: (P) Contact guard assistance  Quality of Gait: (P) unsteady, kyphotic, short steps, NBOS  Gait Deviations: (P) Decreased step length;Decreased step height;Decreased arm swing;Decreased head and trunk rotation;Staggers  Distance: (P) 15', 100' x 2  Comments: (P) standing rest break in between, cues provided for pacing, improved posture, attempts at instruction/education for safe use of SBQC  Stairs/Curb  Stairs?: (P) No (pt declined; stating she has no steps to enter home)     Balance  Posture: (P) Fair  Sitting - Static: (P) Good  Sitting - Dynamic: (P) Good;-  Standing - Static: (P) Fair;+  Standing - Dynamic: (P) Fair  Exercises  Comments: (P) ther ex deferred per pt request         Other Activities: (P) Other (see comment)  Comment: (P) Assisted pt with toilet transfer; CG/SBA- poor safety, unable to follow cues provided for direction/safety, pt became tangled in IV as she rushed to the toilet              G-Code     OutComes Score                                                     AM-PAC Score             Goals  Short term goals  Time Frame for Short term goals: (P) 3-5 days medical pt  Short term goal 1: (P) tolerate 2x10 LE seated RICHARD to gain strength and endurance for funciton  Short term goal 2: (P) Goal Met  Short term goal 3: (P) assess 3 stairs two rails with <= CGA and min vc for safety  Short term goal 4: (P) Pt reporting any decrease in LE leg cramps with increasing walking  Short term goal 5: (P) assist with safe discharge plan  Long term goals  Time Frame for Long term goals : (P) same as [facility] medical pt  Patient Goals   Patient goals : (P) \"I want to feel better and go home. Make the leg cramps better. \"    Plan    Plan  Times per week: (P) 5-7x week  Times per day: (P) Daily  Plan weeks: (P) <1 medical pt , likelly needs apporx 2 weeks of SNF post medical admission.   Current Treatment Recommendations: (P) Strengthening, Endurance Training, Transfer Training, Gait Training, Stair training, Functional Mobility Training, Balance Training, Pain Management, Home Exercise Program, Safety Education & Training, Positioning, Patient/Caregiver Education & Training, Modalities     Therapy Time   Individual Concurrent Group Co-treatment   Time In  11:15 am         Time Out  12:00 pm         Minutes  [address] [place], [facility] [place]  License and [address] Number: [phone]

## 2021-09-02 LAB
ANION GAP SERPL CALCULATED.3IONS-SCNC: 8 MEQ/L (ref 9–15)
BUN BLDV-MCNC: 27 MG/DL (ref 8–23)
CALCIUM SERPL-MCNC: 9.9 MG/DL (ref 8.5–9.9)
CHLORIDE BLD-SCNC: 103 MEQ/L (ref 95–107)
CO2: 28 MEQ/L (ref 20–31)
CREAT SERPL-MCNC: 1.2 MG/DL (ref 0.5–0.9)
GFR AFRICAN AMERICAN: 50.7
GFR NON-AFRICAN AMERICAN: 41.9
GLUCOSE BLD-MCNC: 64 MG/DL (ref 70–99)
POTASSIUM SERPL-SCNC: 3.9 MEQ/L (ref 3.4–4.9)
SODIUM BLD-SCNC: 139 MEQ/L (ref 135–144)

## 2021-09-03 ENCOUNTER — HOSPITAL ENCOUNTER (OUTPATIENT)
Dept: CARDIOLOGY | Age: 86
Discharge: HOME | End: 2021-09-03
Payer: MEDICARE

## 2021-09-03 PROCEDURE — 93296 REM INTERROG EVL PM/IDS: CPT

## 2021-09-17 PROBLEM — E86.0 DEHYDRATION: Status: RESOLVED | Noted: 2021-08-18 | Resolved: 2021-09-17

## 2021-12-03 ENCOUNTER — HOSPITAL ENCOUNTER (OUTPATIENT)
Dept: CARDIOLOGY | Age: 86
Discharge: HOME | End: 2021-12-03
Payer: MEDICARE

## 2021-12-03 PROCEDURE — 93296 REM INTERROG EVL PM/IDS: CPT

## 2022-04-08 ENCOUNTER — HOSPITAL ENCOUNTER (EMERGENCY)
Age: 87
Discharge: HOME | End: 2022-04-08
Attending: EMERGENCY MEDICINE
Payer: MEDICARE

## 2022-04-08 ENCOUNTER — APPOINTMENT (OUTPATIENT)
Dept: GENERAL RADIOLOGY | Age: 87
End: 2022-04-08
Payer: MEDICARE

## 2022-04-08 ENCOUNTER — APPOINTMENT (OUTPATIENT)
Dept: CT IMAGING | Age: 87
End: 2022-04-08
Payer: MEDICARE

## 2022-04-08 VITALS
BODY MASS INDEX: 17.68 KG/M2 | HEART RATE: 83 BPM | OXYGEN SATURATION: 98 % | DIASTOLIC BLOOD PRESSURE: 63 MMHG | RESPIRATION RATE: 18 BRPM | WEIGHT: 110 LBS | TEMPERATURE: 97.7 F | SYSTOLIC BLOOD PRESSURE: 156 MMHG | HEIGHT: 66 IN

## 2022-04-08 DIAGNOSIS — Z71.1 FEARED COMPLAINT WITHOUT DIAGNOSIS: Primary | ICD-10-CM

## 2022-04-08 LAB
ALBUMIN SERPL-MCNC: 3.4 G/DL (ref 3.5–4.6)
ALP BLD-CCNC: 102 U/L (ref 40–130)
ALT SERPL-CCNC: 13 U/L (ref 0–33)
ANION GAP SERPL CALCULATED.3IONS-SCNC: 14 MEQ/L (ref 9–15)
AST SERPL-CCNC: 27 U/L (ref 0–35)
BASOPHILS ABSOLUTE: 0.1 K/UL (ref 0–0.2)
BASOPHILS RELATIVE PERCENT: 0.8 %
BILIRUB SERPL-MCNC: <0.2 MG/DL (ref 0.2–0.7)
BUN BLDV-MCNC: 38 MG/DL (ref 8–23)
CALCIUM SERPL-MCNC: 8.2 MG/DL (ref 8.5–9.9)
CHLORIDE BLD-SCNC: 98 MEQ/L (ref 95–107)
CO2: 21 MEQ/L (ref 20–31)
CREAT SERPL-MCNC: 1.52 MG/DL (ref 0.5–0.9)
EOSINOPHILS ABSOLUTE: 0 K/UL (ref 0–0.7)
EOSINOPHILS RELATIVE PERCENT: 0.6 %
GFR AFRICAN AMERICAN: 38.5
GFR NON-AFRICAN AMERICAN: 31.9
GLOBULIN: 3.2 G/DL (ref 2.3–3.5)
GLUCOSE BLD-MCNC: 357 MG/DL (ref 70–99)
HCT VFR BLD CALC: 38.5 % (ref 37–47)
HEMOGLOBIN: 12.5 G/DL (ref 12–16)
LYMPHOCYTES ABSOLUTE: 1.4 K/UL (ref 1–4.8)
LYMPHOCYTES RELATIVE PERCENT: 16.8 %
MCH RBC QN AUTO: 28.7 PG (ref 27–31.3)
MCHC RBC AUTO-ENTMCNC: 32.5 % (ref 33–37)
MCV RBC AUTO: 88.5 FL (ref 82–100)
MONOCYTES ABSOLUTE: 0.8 K/UL (ref 0.2–0.8)
MONOCYTES RELATIVE PERCENT: 8.8 %
NEUTROPHILS ABSOLUTE: 6.2 K/UL (ref 1.4–6.5)
NEUTROPHILS RELATIVE PERCENT: 73 %
PDW BLD-RTO: 14 % (ref 11.5–14.5)
PLATELET # BLD: 202 K/UL (ref 130–400)
POTASSIUM SERPL-SCNC: 4.3 MEQ/L (ref 3.4–4.9)
RBC # BLD: 4.35 M/UL (ref 4.2–5.4)
SODIUM BLD-SCNC: 133 MEQ/L (ref 135–144)
TOTAL PROTEIN: 6.6 G/DL (ref 6.3–8)
TROPONIN: 0.02 NG/ML (ref 0–0.01)
WBC # BLD: 8.5 K/UL (ref 4.8–10.8)

## 2022-04-08 PROCEDURE — 71045 X-RAY EXAM CHEST 1 VIEW: CPT

## 2022-04-08 PROCEDURE — 93005 ELECTROCARDIOGRAM TRACING: CPT | Performed by: EMERGENCY MEDICINE

## 2022-04-08 PROCEDURE — 85025 COMPLETE CBC W/AUTO DIFF WBC: CPT

## 2022-04-08 PROCEDURE — 84484 ASSAY OF TROPONIN QUANT: CPT

## 2022-04-08 PROCEDURE — 36415 COLL VENOUS BLD VENIPUNCTURE: CPT

## 2022-04-08 PROCEDURE — 80053 COMPREHEN METABOLIC PANEL: CPT

## 2022-04-08 PROCEDURE — 70450 CT HEAD/BRAIN W/O DYE: CPT

## 2022-04-08 PROCEDURE — 99284 EMERGENCY DEPT VISIT MOD MDM: CPT

## 2022-04-08 NOTE — ED NOTES
Patient and family agree to discharge without MRI. Patient will return if S/S return.      Tia Albright RN  04/08/22 4533

## 2022-04-08 NOTE — CARE COORDINATION
04/08/22    From:[facility] WITH STROKE LIKE SYMPTOMS NOT A BED HOLD NO PRECERT NEEDED     Admit:     PMH:A-FIB (ASA) CAD, CHF, DM, HTN,    Anticipated Discharge Disposition:[facility]    Patient Mobility or PT/OT ordered:NO    Consults:     Covid result &/or vacc status:     Barriers to Discharge:  H/O MDRO       Assessments:N.H STATUS

## 2022-04-08 NOTE — ED PROVIDER NOTES
[address] ED  EMERGENCY DEPARTMENT ENCOUNTER      Pt Name: Domenic Crockett  MRN: [id number]  Salv[place]fc[place]ilo 5/4/1928  Date of evaluation: 4/8/2022  Provider: Carol Aparicio MD    CHIEF COMPLAINT       Chief Complaint   Patient presents with    Transient Ischemic Attack     S/S lasted approx 1hr. Symptoms are resolved at this time. HISTORY OF PRESENT ILLNESS   (Location/Symptom, Timing/Onset, Context/Setting, Quality, Duration, Modifying Factors, Severity)  Note limiting factors. 60-year-old female presenting with left arm and leg weakness reportedly. Symptoms started about an hour prior to arrival while in physical therapy. Patient states that her left arm is not weak, she just did not hear staff asking her to raise the arm. She is hard of hearing. The left leg weakness is not new as her leg occasionally gives out. She states she feels well and has no complaints. She is alert and oriented. Daughter who arrives at bedside also notes patient to be at her baseline. Nursing Notes were reviewed. REVIEW OF SYSTEMS    (2-9 systems for level 4, 10 or more for level 5)     Review of Systems   All other systems reviewed and are negative. Except as noted above the remainder of the review of systems was reviewed and negative.        PAST MEDICAL HISTORY     Past Medical History:   Diagnosis Date    Anemia     Atrial fibrillation ([address]     CAD (coronary artery disease)     Cardiomyopathy ([address]     CHF (congestive heart failure) (HCC)     Diabetes mellitus ([facility]     Hypertension          SURGICAL HISTORY       Past Surgical History:   Procedure Laterality Date    APPENDECTOMY      CORONARY ANGIOPLASTY WITH STENT PLACEMENT      FOREARM SURGERY Right 5/13/2021    CLOSED  REDUCTION INTERNAL FIXATION RIGHT  DISTAL RADIUS PINNING performed by Anna Wade MD at [address] Left 12/2012    PACEMAKER INSERTION      TONSILLECTOMY      WRIST FRACTURE SURGERY Left 12/2012         CURRENT MEDICATIONS       Current Discharge Medication List      CONTINUE these medications which have NOT CHANGED    Details   insulin glargine (LANTUS) 100 UNIT/ML injection vial Inject 8 Units into the skin 2 times daily  Qty: 1 vial, Refills: 3      carvedilol (COREG) 6.25 MG tablet Take 1 tablet by mouth 2 times daily  Qty: 60 tablet, Refills: 3      isosorbide mononitrate (IMDUR) 30 MG extended release tablet Take 1 tablet by mouth daily  Qty: 30 tablet, Refills: 3      aspirin 81 MG EC tablet Take 1 tablet by mouth daily  Qty: 30 tablet, Refills: 3      SITagliptin (JANUVIA) 100 MG tablet Take 0.5 tablets by mouth daily Take half a tablet daily  Refills: 0      glucose (GLUTOSE) 40 % GEL Take 37.5 mLs by mouth as needed (Low blood sugar less than 80)  Qty: 45 g, Refills: 2      potassium chloride (KLOR-CON M) 10 MEQ extended release tablet Take 1 tablet by mouth daily  Qty: 60 tablet, Refills: 3      mirtazapine (REMERON) 15 MG tablet Take 7.5 mg by mouth nightly       donepezil (ARICEPT) 5 MG tablet Take 5 mg by mouth nightly             ALLERGIES     Tylenol [acetaminophen]    FAMILY HISTORY     History reviewed. No pertinent family history. SOCIAL HISTORY       Social History     Socioeconomic History    Marital status:       Spouse name: None    Number of children: None    Years of education: None    Highest education level: None   Occupational History    None   Tobacco Use    Smoking status: Former Smoker    Smokeless tobacco: Never Used   Vaping Use    Vaping Use: Never used   Substance and Sexual Activity    Alcohol use: Not Currently    Drug use: Never    Sexual activity: Not Currently   Other Topics Concern    None   Social History Narrative    None     Social Determinants of Health     Financial Resource Strain:     Difficulty of Paying Living Expenses: Not on file   Food Insecurity:     Worried About Running Out of Food in the Last Year: Not on file   Scarlet Ran Out of Food in the Last Year: Not on file   Transportation Needs:     Lack of Transportation (Medical): Not on file    Lack of Transportation (Non-Medical): Not on file   Physical Activity:     Days of Exercise per Week: Not on file    Minutes of Exercise per Session: Not on file   Stress:     Feeling of Stress : Not on file   Social Connections:     Frequency of Communication with Friends and Family: Not on file    Frequency of Social Gatherings with Friends and Family: Not on file    Attends [employer] Services: Not on file    Active Member of [address] [place] [address] [employer] or Organizations: Not on file    Attends Club or Organization Meetings: Not on file    Marital Status: Not on file   Intimate Partner Violence:     Fear of Current or Ex-Partner: Not on file    Emotionally Abused: Not on file    Physically Abused: Not on file    Sexually Abused: Not on file   Housing Stability:     Unable to Pay for Housing in the Last Year: Not on file    Number of [place] in the Last Year: Not on file    Unstable Housing in the Last Year: Not on file       SCREENINGS    [place] Coma Scale  Eye Opening: Spontaneous  Best Verbal Response: Oriented  Best Motor Response: Obeys commands  Trish Coma Scale Score: 15          PHYSICAL EXAM    (up to 7 for level 4, 8 or more for level 5)     ED Triage Vitals [04/08/22 1018]   BP Temp Temp Source Pulse Resp SpO2 Height Weight   (!) 156/63 97.7 °F (36.5 °C) Oral 83 18 98 % 5' 6\" (1.676 m) 110 lb (49.9 kg)       Physical Exam  Vitals and nursing note reviewed. Constitutional:       General: She is not in acute distress. Appearance: Normal appearance. She is well-developed. She is not ill-appearing. HENT:      Head: Normocephalic and atraumatic. Mouth/Throat:      Mouth: Mucous membranes are moist.      Pharynx: Oropharynx is clear. Eyes:      Extraocular Movements: Extraocular movements intact.       Conjunctiva/sclera: Conjunctivae normal.   Cardiovascular:      Rate and Rhythm: Normal rate and regular rhythm. Pulmonary:      Effort: Pulmonary effort is normal.      Breath sounds: Normal breath sounds. Abdominal:      General: Bowel sounds are normal.      Palpations: Abdomen is soft. Tenderness: There is no abdominal tenderness. Musculoskeletal:         General: No deformity. Normal range of motion. Cervical back: Normal range of motion and neck supple. Skin:     General: Skin is warm and dry. Capillary Refill: Capillary refill takes less than 2 seconds. Neurological:      General: No focal deficit present. Mental Status: She is alert and oriented to person, place, and time. Mental status is at baseline. Cranial Nerves: No cranial nerve deficit. Comments: NIHSS 0   Psychiatric:         Thought Content: Thought content normal.         DIAGNOSTIC RESULTS     EKG: All EKG's are interpreted by the Emergency Department Physician who either signs or Co-signs this chart in the absence of a cardiologist.    Paced, rate 78, normal intervals, no ST elevation/ depression, unchanged from previous    RADIOLOGY:   Non-plain film images such as CT, Ultrasound and MRI are read by the radiologist. Plain radiographic images are visualized and preliminarily interpreted by the emergency physician with the below findings:    CXR- neg acute    Interpretation per the Radiologist below, if available at the time of this note:    XR CHEST PORTABLE   Final Result   NO ACUTE CARDIOPULMONARY DISEASE. CT Head WO Contrast   Final Result   Impression:      Mild to moderate cerebral atrophy. Chronic ischemic white matter disease. All CT scans at this facility use dose modulation, iterative reconstruction, and/or weight based dosing when appropriate to reduce radiation dose to as low as reasonably achievable.       MRI BRAIN WO CONTRAST    (Results Pending)       LABS:  Labs Reviewed   COMPREHENSIVE METABOLIC PANEL - Abnormal; Notable for the following components:       Result Value    Sodium 133 (*)     Glucose 357 (*)     BUN 38 (*)     CREATININE 1.52 (*)     GFR Non- 31.9 (*)     GFR  38.5 (*)     Calcium 8.2 (*)     Albumin 3.4 (*)     All other components within normal limits   CBC WITH AUTO DIFFERENTIAL - Abnormal; Notable for the following components:    MCHC 32.5 (*)     All other components within normal limits   TROPONIN - Abnormal; Notable for the following components:    Troponin 0.016 (*)     All other components within normal limits    Narrative:     Jolly Davenport tel. [phone],  TROP results called to and read back by [facility] RN, 04/08/2022 11:24, by  Anjana Atkins       All other labs were within normal range or not returned as of this dictation. EMERGENCY DEPARTMENT COURSE and DIFFERENTIAL DIAGNOSIS/MDM:   Vitals:    Vitals:    04/08/22 1018   BP: (!) 156/63   Pulse: 83   Resp: 18   Temp: 97.7 °F (36.5 °C)   TempSrc: Oral   SpO2: 98%   Weight: 110 lb (49.9 kg)   Height: 5' 6\" (1.676 m)       MDM  Number of Diagnoses or Management Options  Feared complaint without diagnosis  Diagnosis management comments: Patient has no focal neurologic findings on exam.  Her work-up is negative. Upon talking with daughter, I do feel there was miscommunication as patient has no findings concerning for TIA or stroke on admission or during ED course. I did offer admission for an MRI but family is agreeable to taking her back to [facility]. I did inform them that this is possible even if patient had a TIA, but I do feel like based on report there was significant misinformation by staff. Patient will be discharged back in fair condition. Patient has been hemodynamically stable throughout ED course and is appropriate for outpatient follow up. Patient should follow up with PCP in 2-3 days or return to ED immediately for any new or worsening symptoms.   Patient is well appearing on discharge and agreeable with plan of care.        Procedures    CRITICAL CARE TIME   Total Critical Care time was 0 minutes, excluding separately reportable procedures. There was a high probability of clinically significant/life threatening deterioration in the patient's condition which required my urgent intervention. FINAL IMPRESSION      1.  Feared complaint without diagnosis          DISPOSITION/PLAN   DISPOSITION Decision To Discharge 04/08/2022 11:48:35 AM      (Please note that portions of this note were completed with a voice recognition program.  Efforts were made to edit the dictations but occasionally words are mis-transcribed.)    Mark Lee MD (electronically signed)  Attending Emergency Physician        Mark Lee MD  04/08/22 9499

## 2022-04-08 NOTE — ED TRIAGE NOTES
Patient presents from SNF via EMS with no complaints at this time. Per EMS, staff said that 1hr PTA patient was in PT and could not move her L arm and leg. EMS states that on their arrival, patient was not moving L arm and leg and that patient was altered in mentation on their arrival. On arrival here, patient A&Ox4. Skin p/w/d. MSPs intact x4. Equal  bilat. Dr Liza Reyes at bedside on arrival. After assessment, Code BAT not initiated. Symptoms resolved.

## 2022-04-08 NOTE — CARE COORDINATION
Pt is from [facility]. LSW Semie checking if pt is a bed hold. Electronically signed by Luis Armando Williamson RN on 4/8/2022 at 10:53 AM

## 2022-04-11 ENCOUNTER — OFFICE VISIT (OUTPATIENT)
Dept: INFECTIOUS DISEASES | Age: 87
End: 2022-04-11
Payer: MEDICARE

## 2022-04-11 VITALS
BODY MASS INDEX: 17.75 KG/M2 | WEIGHT: 110 LBS | HEART RATE: 86 BPM | DIASTOLIC BLOOD PRESSURE: 60 MMHG | SYSTOLIC BLOOD PRESSURE: 121 MMHG | TEMPERATURE: 97 F

## 2022-04-11 DIAGNOSIS — L97.519 DIABETIC ULCER OF TOE OF RIGHT FOOT ASSOCIATED WITH DIABETES MELLITUS DUE TO UNDERLYING CONDITION, UNSPECIFIED ULCER STAGE (HCC): ICD-10-CM

## 2022-04-11 DIAGNOSIS — I73.9 PERIPHERAL ARTERIAL DISEASE (HCC): ICD-10-CM

## 2022-04-11 DIAGNOSIS — M86.171 ACUTE OSTEOMYELITIS OF TOE OF RIGHT FOOT (HCC): Primary | ICD-10-CM

## 2022-04-11 DIAGNOSIS — E08.621 DIABETIC ULCER OF TOE OF RIGHT FOOT ASSOCIATED WITH DIABETES MELLITUS DUE TO UNDERLYING CONDITION, UNSPECIFIED ULCER STAGE (HCC): ICD-10-CM

## 2022-04-11 LAB
EKG ATRIAL RATE: 78 BPM
EKG P AXIS: 65 DEGREES
EKG P-R INTERVAL: 178 MS
EKG Q-T INTERVAL: 466 MS
EKG QRS DURATION: 152 MS
EKG QTC CALCULATION (BAZETT): 531 MS
EKG R AXIS: -69 DEGREES
EKG T AXIS: 122 DEGREES
EKG VENTRICULAR RATE: 78 BPM

## 2022-04-11 PROCEDURE — G8419 CALC BMI OUT NRM PARAM NOF/U: HCPCS | Performed by: INTERNAL MEDICINE

## 2022-04-11 PROCEDURE — 1123F ACP DISCUSS/DSCN MKR DOCD: CPT | Performed by: INTERNAL MEDICINE

## 2022-04-11 PROCEDURE — 1036F TOBACCO NON-USER: CPT | Performed by: INTERNAL MEDICINE

## 2022-04-11 PROCEDURE — G8427 DOCREV CUR MEDS BY ELIG CLIN: HCPCS | Performed by: INTERNAL MEDICINE

## 2022-04-11 PROCEDURE — 99203 OFFICE O/P NEW LOW 30 MIN: CPT | Performed by: INTERNAL MEDICINE

## 2022-04-11 PROCEDURE — 4040F PNEUMOC VAC/ADMIN/RCVD: CPT | Performed by: INTERNAL MEDICINE

## 2022-04-11 PROCEDURE — 1090F PRES/ABSN URINE INCON ASSESS: CPT | Performed by: INTERNAL MEDICINE

## 2022-04-11 RX ORDER — MEMANTINE HYDROCHLORIDE 5 MG/1
5 TABLET ORAL 2 TIMES DAILY
COMMUNITY
Start: 2022-02-28

## 2022-04-11 RX ORDER — LINEZOLID 600 MG/1
600 TABLET, FILM COATED ORAL 2 TIMES DAILY
Qty: 60 TABLET | Refills: 0 | Status: SHIPPED | OUTPATIENT
Start: 2022-04-11 | End: 2022-05-11

## 2022-04-11 RX ORDER — MULTIVITAMIN/IRON/FOLIC ACID 18MG-0.4MG
250 TABLET ORAL 2 TIMES DAILY
COMMUNITY

## 2022-04-11 NOTE — PROGRESS NOTES
Shawna Blankenship (:  1928) is a 80 y.o. female,New patient, here for evaluation of the following chief complaint(s):  New Patient (referral from [facility]? for acute OM R foot )         ASSESSMENT/PLAN:  Right second toe acute osteomyelitis  Right second toe diabetic foot ulcer with lack of healing status post distal amputation  MRSE infection  Peripheral arterial disease and diabetes mellitus type 2, status post recent angioplasty    Daughter does not want IV antibiotics at this point  Zyvox 600 mg p.o. twice daily for 2 to 4 weeks  CBC BMP CRP in 2 weeks  Follow-up in 2 weeks  Follow-up with podiatry and vascular surgery  Hold Remeron while on Zyvox    Subjective   SUBJECTIVE/OBJECTIVE:  HPI  Referred for right second toe acute osteomyelitis status post distal amputation 2 weeks ago. Culture showed methicillin-resistant staph epidermidis. Patient had redness and wound dehiscence few days ago after sutures were removed. Patient started wearing shoes recently. Patient has insulin-dependent diabetes mellitus with peripheral arterial disease. Had angioplasty by Dr. Luis Alberto Poe at [facility] clinic last month. Patient is in a wheelchair with her daughter  Patient is poor historian secondary to dementia  Daughter is providing most of the history  Positive reports of left leg cramps, currently controlled.   Past Medical History:   Diagnosis Date    Anemia     Atrial fibrillation ([address]     CAD (coronary artery disease)     Cardiomyopathy ([address]     CHF (congestive heart failure) (HCC)     Diabetes mellitus ([address]     Hypertension      Past Surgical History:   Procedure Laterality Date    APPENDECTOMY      CORONARY ANGIOPLASTY WITH STENT PLACEMENT      FOREARM SURGERY Right 2021    CLOSED  REDUCTION INTERNAL FIXATION RIGHT  DISTAL RADIUS PINNING performed by Andreas Monteiro MD at [address] Left 2012    PACEMAKER INSERTION      TONSILLECTOMY      WRIST FRACTURE heard.      Pulmonary:      Effort: Pulmonary effort is normal. No respiratory distress. Breath sounds: No wheezing or rales. Abdominal:      General: Abdomen is flat. There is no distension. Tenderness: There is no abdominal tenderness. There is no guarding. Musculoskeletal:         General: No swelling. Right lower leg: No edema. Left lower leg: No edema. Skin:     Findings: Erythema (.Right second toe distal aspect with mild) and lesion (.  Right second toe with distal amputation and wound dehiscence, no drainage) present. No rash. Neurological:      Mental Status: She is alert and oriented to person, place, and time. Cranial Nerves: No cranial nerve deficit. Motor: Weakness present. Psychiatric:         Mood and Affect: Mood normal.         Behavior: Behavior normal.            On this date 4/11/2022 I have spent 30 minutes reviewing previous notes, test results and face to face with the patient discussing the diagnosis and importance of compliance with the treatment plan as well as documenting on the day of the visit. An electronic signature was used to authenticate this note.     --Torri Worthington MD

## 2022-04-26 ENCOUNTER — OFFICE VISIT (OUTPATIENT)
Dept: INFECTIOUS DISEASES | Age: 87
End: 2022-04-26
Payer: MEDICARE

## 2022-04-26 VITALS
BODY MASS INDEX: 18.72 KG/M2 | TEMPERATURE: 96.6 F | HEART RATE: 85 BPM | DIASTOLIC BLOOD PRESSURE: 60 MMHG | SYSTOLIC BLOOD PRESSURE: 118 MMHG | WEIGHT: 116 LBS

## 2022-04-26 DIAGNOSIS — M86.171 ACUTE OSTEOMYELITIS OF TOE OF RIGHT FOOT (HCC): Primary | ICD-10-CM

## 2022-04-26 PROCEDURE — 1036F TOBACCO NON-USER: CPT | Performed by: INTERNAL MEDICINE

## 2022-04-26 PROCEDURE — 1123F ACP DISCUSS/DSCN MKR DOCD: CPT | Performed by: INTERNAL MEDICINE

## 2022-04-26 PROCEDURE — 99213 OFFICE O/P EST LOW 20 MIN: CPT | Performed by: INTERNAL MEDICINE

## 2022-04-26 PROCEDURE — 4040F PNEUMOC VAC/ADMIN/RCVD: CPT | Performed by: INTERNAL MEDICINE

## 2022-04-26 PROCEDURE — 1090F PRES/ABSN URINE INCON ASSESS: CPT | Performed by: INTERNAL MEDICINE

## 2022-04-26 PROCEDURE — G8420 CALC BMI NORM PARAMETERS: HCPCS | Performed by: INTERNAL MEDICINE

## 2022-04-26 PROCEDURE — G8427 DOCREV CUR MEDS BY ELIG CLIN: HCPCS | Performed by: INTERNAL MEDICINE

## 2022-04-26 ASSESSMENT — ENCOUNTER SYMPTOMS
RESPIRATORY NEGATIVE: 1
GASTROINTESTINAL NEGATIVE: 1

## 2022-04-26 NOTE — PROGRESS NOTES
Infectious Disease     Patient Name: Sohail Rogel  Date: 4/26/2022  YOB: 1928  Medical Record Number: [id number]        Right second toe acute osteomyelitis  MRSE infection    Started on Zyvox 2 weeks ago      Review of Systems   Respiratory: Negative. Cardiovascular: Negative. Gastrointestinal: Negative. Skin: Positive for wound.        Review of Systems: All 14 review of systems negative other than as stated above    Social History     Tobacco Use    Smoking status: Former Smoker    Smokeless tobacco: Never Used   Vaping Use    Vaping Use: Never used   Substance Use Topics    Alcohol use: Not Currently    Drug use: Never         Past Medical History:   Diagnosis Date    Anemia     Atrial fibrillation ([address]     CAD (coronary artery disease)     Cardiomyopathy ([facility]     CHF (congestive heart failure) ([facility]     Diabetes mellitus ([facility] [address]     Hypertension            Past Surgical History:   Procedure Laterality Date    APPENDECTOMY      CORONARY ANGIOPLASTY WITH STENT PLACEMENT      FOREARM SURGERY Right 5/13/2021    CLOSED  REDUCTION INTERNAL FIXATION RIGHT  DISTAL RADIUS PINNING performed by Kishore Steele MD at [address] Left 12/2012    PACEMAKER INSERTION      TONSILLECTOMY      WRIST FRACTURE SURGERY Left 12/2012         Current Outpatient Medications on File Prior to Visit   Medication Sig Dispense Refill    memantine (NAMENDA) 5 MG tablet Take 5 mg by mouth in the morning and at bedtime      Magnesium Oxide (MAGNESIUM-OXIDE) 250 MG TABS tablet Take 250 mg by mouth in the morning and at bedtime      linezolid (ZYVOX) 600 MG tablet Take 1 tablet by mouth 2 times daily 60 tablet 0    insulin glargine (LANTUS) 100 UNIT/ML injection vial Inject 8 Units into the skin 2 times daily 1 vial 3    carvedilol (COREG) 6.25 MG tablet Take 1 tablet by mouth 2 times daily 60 tablet 3    isosorbide mononitrate (IMDUR) 30 MG extended release tablet Take 1 tablet by mouth daily 30 tablet 3    aspirin 81 MG EC tablet Take 1 tablet by mouth daily (Patient taking differently: Take 325 mg by mouth daily ) 30 tablet 3    SITagliptin (JANUVIA) 100 MG tablet Take 25 mg by mouth daily Take half a tablet daily  0    glucose (GLUTOSE) 40 % GEL Take 37.5 mLs by mouth as needed (Low blood sugar less than 80) 45 g 2    potassium chloride (KLOR-CON M) 10 MEQ extended release tablet Take 1 tablet by mouth daily (Patient not taking: Reported on 4/11/2022) 60 tablet 3    donepezil (ARICEPT) 5 MG tablet Take 5 mg by mouth nightly (Patient not taking: Reported on 4/11/2022)       No current facility-administered medications on file prior to visit. Allergies   Allergen Reactions    Tylenol [Acetaminophen] Nausea And Vomiting and Rash         No family history on file. Physical Exam:      Physical Exam  Cardiovascular:      Heart sounds: Normal heart sounds. Pulmonary:      Effort: Pulmonary effort is normal. No respiratory distress. Breath sounds: Normal breath sounds. No wheezing or rales. Abdominal:      General: Abdomen is flat. Bowel sounds are normal. There is no distension. Palpations: There is no mass. Tenderness: There is no abdominal tenderness. There is no guarding. Hernia: No hernia is present. Musculoskeletal:        Feet:          Blood pressure 118/60, pulse 85, temperature 96.6 °F (35.9 °C), weight 116 lb (52.6 kg), currently breastfeeding.       .   Lab Results   Component Value Date    WBC 8.5 04/08/2022    HGB 12.5 04/08/2022    HCT 38.5 04/08/2022    MCV 88.5 04/08/2022     04/08/2022     Lab Results   Component Value Date     04/08/2022    K 4.3 04/08/2022    K 3.8 08/18/2021    CL 98 04/08/2022    CO2 21 04/08/2022    BUN 38 04/08/2022    CREATININE 1.52 04/08/2022    GLUCOSE 357 04/08/2022    GLUCOSE 74 06/08/2021    CALCIUM 8.2 04/08/2022                ASSESSMENT:  Patient Active Problem List   Diagnosis    Ischemic cardiomyopathy    CAD (coronary artery disease)    Hypertension    Pure hypercholesterolemia    Closed nondisp transvrs fracture of right patella with routine healing    Diabetes mellitus ([address]    Pacemaker    Cardiomyopathy ([address]    Closed fracture of right distal radius and ulna    Closed nondisplaced transverse fracture of right patella with routine healing    Type 2 diabetes mellitus with hyperosmolar hyperglycemic state (HHS) ([address]    Hyperglycemia         PLAN:    Right second toe acute osteomyelitis  MRSE infection      On Zyvox for 2 more weeks

## 2022-05-12 ENCOUNTER — TELEMEDICINE (OUTPATIENT)
Dept: INFECTIOUS DISEASES | Age: 87
End: 2022-05-12
Payer: MEDICARE

## 2022-05-12 DIAGNOSIS — L08.9 INFECTION OF TOE: ICD-10-CM

## 2022-05-12 DIAGNOSIS — M79.673 ACUTE FOOT PAIN, UNSPECIFIED LATERALITY: ICD-10-CM

## 2022-05-12 DIAGNOSIS — M86.171 ACUTE OSTEOMYELITIS OF TOE OF RIGHT FOOT (HCC): Primary | ICD-10-CM

## 2022-05-12 DIAGNOSIS — L97.519 DIABETIC ULCER OF TOE OF RIGHT FOOT ASSOCIATED WITH DIABETES MELLITUS DUE TO UNDERLYING CONDITION, UNSPECIFIED ULCER STAGE (HCC): ICD-10-CM

## 2022-05-12 DIAGNOSIS — I73.9 PERIPHERAL ARTERIAL DISEASE (HCC): ICD-10-CM

## 2022-05-12 DIAGNOSIS — E08.621 DIABETIC ULCER OF TOE OF RIGHT FOOT ASSOCIATED WITH DIABETES MELLITUS DUE TO UNDERLYING CONDITION, UNSPECIFIED ULCER STAGE (HCC): ICD-10-CM

## 2022-05-12 PROCEDURE — 1123F ACP DISCUSS/DSCN MKR DOCD: CPT | Performed by: INTERNAL MEDICINE

## 2022-05-12 PROCEDURE — G8420 CALC BMI NORM PARAMETERS: HCPCS | Performed by: INTERNAL MEDICINE

## 2022-05-12 PROCEDURE — 1036F TOBACCO NON-USER: CPT | Performed by: INTERNAL MEDICINE

## 2022-05-12 PROCEDURE — G8428 CUR MEDS NOT DOCUMENT: HCPCS | Performed by: INTERNAL MEDICINE

## 2022-05-12 PROCEDURE — 1090F PRES/ABSN URINE INCON ASSESS: CPT | Performed by: INTERNAL MEDICINE

## 2022-05-12 PROCEDURE — 99213 OFFICE O/P EST LOW 20 MIN: CPT | Performed by: INTERNAL MEDICINE

## 2022-05-15 ENCOUNTER — APPOINTMENT (OUTPATIENT)
Dept: CT IMAGING | Age: 87
End: 2022-05-15
Payer: MEDICARE

## 2022-05-15 ENCOUNTER — APPOINTMENT (OUTPATIENT)
Dept: GENERAL RADIOLOGY | Age: 87
End: 2022-05-15
Payer: MEDICARE

## 2022-05-15 ENCOUNTER — HOSPITAL ENCOUNTER (EMERGENCY)
Age: 87
Discharge: HOME | End: 2022-05-15
Payer: MEDICARE

## 2022-05-15 VITALS
HEIGHT: 63 IN | OXYGEN SATURATION: 94 % | BODY MASS INDEX: 20.38 KG/M2 | HEART RATE: 94 BPM | SYSTOLIC BLOOD PRESSURE: 146 MMHG | TEMPERATURE: 97.7 F | WEIGHT: 115 LBS | RESPIRATION RATE: 18 BRPM | DIASTOLIC BLOOD PRESSURE: 66 MMHG

## 2022-05-15 DIAGNOSIS — R73.9 HYPERGLYCEMIA: ICD-10-CM
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
## 2022-05-15 DIAGNOSIS — D64.9 ANEMIA, UNSPECIFIED TYPE: ICD-10-CM

## 2022-05-15 LAB
ALBUMIN SERPL-MCNC: 3.7 G/DL (ref 3.5–4.6)
ALP BLD-CCNC: 127 U/L (ref 40–130)
ALT SERPL-CCNC: 18 U/L (ref 0–33)
ANION GAP SERPL CALCULATED.3IONS-SCNC: 15 MEQ/L (ref 9–15)
APTT: 34.3 SEC (ref 24.4–36.8)
AST SERPL-CCNC: 16 U/L (ref 0–35)
BACTERIA: NEGATIVE /HPF
BANDED NEUTROPHILS RELATIVE PERCENT: 2 % (ref 5–11)
BASOPHILS ABSOLUTE: 0 K/UL (ref 0–0.2)
BASOPHILS RELATIVE PERCENT: 0.8 %
BILIRUB SERPL-MCNC: <0.2 MG/DL (ref 0.2–0.7)
BILIRUBIN URINE: NEGATIVE
BLOOD, URINE: NEGATIVE
BUN BLDV-MCNC: 24 MG/DL (ref 8–23)
CALCIUM SERPL-MCNC: 8.7 MG/DL (ref 8.5–9.9)
CHLORIDE BLD-SCNC: 95 MEQ/L (ref 95–107)
CHP ED QC CHECK: NORMAL
CLARITY: CLEAR
CO2: 21 MEQ/L (ref 20–31)
COLOR: YELLOW
CREAT SERPL-MCNC: 1.52 MG/DL (ref 0.5–0.9)
EOSINOPHILS ABSOLUTE: 0.1 K/UL (ref 0–0.7)
EOSINOPHILS RELATIVE PERCENT: 2 %
EPITHELIAL CELLS, UA: ABNORMAL /HPF (ref 0–5)
GFR AFRICAN AMERICAN: 38.5
GFR NON-AFRICAN AMERICAN: 31.8
GLOBULIN: 2.6 G/DL (ref 2.3–3.5)
GLUCOSE BLD-MCNC: 434 MG/DL (ref 70–99)
GLUCOSE BLD-MCNC: 451 MG/DL
GLUCOSE BLD-MCNC: 451 MG/DL (ref 70–99)
GLUCOSE URINE: >=1000 MG/DL
HCT VFR BLD CALC: 26 % (ref 37–47)
HEMOGLOBIN: 8.5 G/DL (ref 12–16)
HYALINE CASTS: ABNORMAL /HPF (ref 0–5)
INR BLD: 1.2
KETONES, URINE: NEGATIVE MG/DL
LEUKOCYTE ESTERASE, URINE: ABNORMAL
LYMPHOCYTES ABSOLUTE: 1.5 K/UL (ref 1–4.8)
LYMPHOCYTES RELATIVE PERCENT: 23 %
MCH RBC QN AUTO: 28 PG (ref 27–31.3)
MCHC RBC AUTO-ENTMCNC: 32.9 % (ref 33–37)
MCV RBC AUTO: 85.2 FL (ref 82–100)
MONOCYTES ABSOLUTE: 0.9 K/UL (ref 0.2–0.8)
MONOCYTES RELATIVE PERCENT: 13.9 %
NEUTROPHILS ABSOLUTE: 4 K/UL (ref 1.4–6.5)
NEUTROPHILS RELATIVE PERCENT: 60 %
NITRITE, URINE: NEGATIVE
PDW BLD-RTO: 13.3 % (ref 11.5–14.5)
PERFORMED ON: ABNORMAL
PH UA: 7 (ref 5–9)
PLATELET # BLD: 155 K/UL (ref 130–400)
PLATELET SLIDE REVIEW: NORMAL
POTASSIUM SERPL-SCNC: 4.6 MEQ/L (ref 3.4–4.9)
PROTEIN UA: NEGATIVE MG/DL
PROTHROMBIN TIME: 15.3 SEC (ref 12.3–14.9)
RBC # BLD: 3.05 M/UL (ref 4.2–5.4)
RBC # BLD: NORMAL 10*6/UL
RBC UA: ABNORMAL /HPF (ref 0–5)
SMUDGE CELLS: 1.5
SODIUM BLD-SCNC: 131 MEQ/L (ref 135–144)
SPECIFIC GRAVITY UA: 1.02 (ref 1–1.03)
TOTAL PROTEIN: 6.3 G/DL (ref 6.3–8)
URINE REFLEX TO CULTURE: ABNORMAL
UROBILINOGEN, URINE: 0.2 E.U./DL
WBC # BLD: 6.4 K/UL (ref 4.8–10.8)
WBC UA: ABNORMAL /HPF (ref 0–5)

## 2022-05-15 PROCEDURE — 6370000000 HC RX 637 (ALT 250 FOR IP): Performed by: PHYSICIAN ASSISTANT

## 2022-05-15 PROCEDURE — 96372 THER/PROPH/DIAG INJ SC/IM: CPT

## 2022-05-15 PROCEDURE — 85025 COMPLETE CBC W/AUTO DIFF WBC: CPT

## 2022-05-15 PROCEDURE — 85730 THROMBOPLASTIN TIME PARTIAL: CPT

## 2022-05-15 PROCEDURE — 6830039000 HC L3 TRAUMA ALERT

## 2022-05-15 PROCEDURE — 80053 COMPREHEN METABOLIC PANEL: CPT

## 2022-05-15 PROCEDURE — 73060 X-RAY EXAM OF HUMERUS: CPT

## 2022-05-15 PROCEDURE — 81001 URINALYSIS AUTO W/SCOPE: CPT

## 2022-05-15 PROCEDURE — 36415 COLL VENOUS BLD VENIPUNCTURE: CPT

## 2022-05-15 PROCEDURE — 70450 CT HEAD/BRAIN W/O DYE: CPT

## 2022-05-15 PROCEDURE — 72170 X-RAY EXAM OF PELVIS: CPT

## 2022-05-15 PROCEDURE — 71045 X-RAY EXAM CHEST 1 VIEW: CPT

## 2022-05-15 PROCEDURE — 73030 X-RAY EXAM OF SHOULDER: CPT

## 2022-05-15 PROCEDURE — 85610 PROTHROMBIN TIME: CPT

## 2022-05-15 PROCEDURE — 72125 CT NECK SPINE W/O DYE: CPT

## 2022-05-15 PROCEDURE — 99285 EMERGENCY DEPT VISIT HI MDM: CPT

## 2022-05-15 RX ORDER — INSULIN LISPRO 100 [IU]/ML
0-12 INJECTION, SOLUTION INTRAVENOUS; SUBCUTANEOUS
Status: DISCONTINUED | OUTPATIENT
Start: 2022-05-15 | End: 2022-05-15 | Stop reason: HOSPADM

## 2022-05-15 RX ORDER — INSULIN LISPRO 100 [IU]/ML
10 INJECTION, SOLUTION INTRAVENOUS; SUBCUTANEOUS ONCE
Status: COMPLETED | OUTPATIENT
Start: 2022-05-15 | End: 2022-05-15

## 2022-05-15 RX ORDER — INSULIN LISPRO 100 [IU]/ML
0-6 INJECTION, SOLUTION INTRAVENOUS; SUBCUTANEOUS NIGHTLY
Status: DISCONTINUED | OUTPATIENT
Start: 2022-05-15 | End: 2022-05-15 | Stop reason: HOSPADM

## 2022-05-15 RX ADMIN — INSULIN LISPRO 10 UNITS: 100 INJECTION, SOLUTION INTRAVENOUS; SUBCUTANEOUS at 17:56

## 2022-05-15 ASSESSMENT — PAIN - FUNCTIONAL ASSESSMENT
PAIN_FUNCTIONAL_ASSESSMENT: NONE - DENIES PAIN
PAIN_FUNCTIONAL_ASSESSMENT: NONE - DENIES PAIN

## 2022-05-15 ASSESSMENT — ENCOUNTER SYMPTOMS
ABDOMINAL DISTENTION: 0
ABDOMINAL PAIN: 0
RHINORRHEA: 0
COLOR CHANGE: 0
CONSTIPATION: 0
EYE DISCHARGE: 0
SORE THROAT: 0
SHORTNESS OF BREATH: 0

## 2022-05-15 NOTE — ED PROVIDER NOTES
[facility]  eMERGENCY dEPARTMENT eNCOUnter      Pt Name: Sabrina Matthews  MRN: [id number]  [place] 5/4/1928  Date of evaluation: 5/15/2022  Provider: Alina Day PA-C    CHIEF COMPLAINT       Chief Complaint   Patient presents with    Fall         HISTORY OF PRESENT ILLNESS   (Location/Symptom, Timing/Onset,Context/Setting, Quality, Duration, Modifying Factors, Severity)  Note limiting factors. Sabrina Matthews is a 80 y.o. female who presents to the emergency department complaint of unwitnessed fall which occurred at patient's home. Daughter states that she found patient lying in the floor, she does not know whether she fell, she was not present at the time. Patient was alert and oriented, she does not know if there was any loss of consciousness. Patient does have past history of dementia so she is a very poor historian she does not recall the fall herself. She has no complaints of injury. No chest pain no shortness of breath no dizziness. Past medical history significant for cardiomyopathy, hypertension, anemia, diabetes, coronary artery disease, congestive heart failure, atrial fibrillation. HPI    NursingNotes were reviewed. REVIEW OF SYSTEMS    (2-9 systems for level 4, 10 or more for level 5)     Review of Systems   Constitutional: Negative for activity change and appetite change. Unwitnessed fall   HENT: Negative for congestion, ear discharge, ear pain, nosebleeds, rhinorrhea and sore throat. Eyes: Negative for discharge. Respiratory: Negative for shortness of breath. Cardiovascular: Negative for chest pain, palpitations and leg swelling. Gastrointestinal: Negative for abdominal distention, abdominal pain and constipation. Genitourinary: Negative for difficulty urinating and dysuria. Musculoskeletal: Negative for arthralgias. Skin: Negative for color change. Neurological: Negative for dizziness, syncope, numbness and headaches. Psychiatric/Behavioral: Negative for agitation and confusion. Except as noted above the remainder of the review of systems was reviewed and negative. PAST MEDICAL HISTORY     Past Medical History:   Diagnosis Date    Anemia     Atrial [facility] ([facility]     CAD (coronary artery disease)     Cardiomyopathy ([facility]     CHF (congestive heart failure) ([facility]     Diabetes mellitus ([facility]     Hypertension          SURGICALHISTORY       Past Surgical History:   Procedure Laterality Date    APPENDECTOMY      CORONARY ANGIOPLASTY WITH STENT PLACEMENT      FOREARM SURGERY Right 5/13/2021    CLOSED  REDUCTION INTERNAL FIXATION RIGHT  DISTAL RADIUS PINNING performed by Saintclair Cower, MD at [address] Left 12/2012    PACEMAKER INSERTION      TONSILLECTOMY      WRIST FRACTURE SURGERY Left 12/2012         CURRENT MEDICATIONS       Previous Medications    ASPIRIN 81 MG EC TABLET    Take 1 tablet by mouth daily    CARVEDILOL (COREG) 6.25 MG TABLET    Take 1 tablet by mouth 2 times daily    DONEPEZIL (ARICEPT) 5 MG TABLET    Take 5 mg by mouth nightly    GLUCOSE (GLUTOSE) 40 % GEL    Take 37.5 mLs by mouth as needed (Low blood sugar less than 80)    INSULIN GLARGINE (LANTUS) 100 UNIT/ML INJECTION VIAL    Inject 8 Units into the skin 2 times daily    ISOSORBIDE MONONITRATE (IMDUR) 30 MG EXTENDED RELEASE TABLET    Take 1 tablet by mouth daily    MAGNESIUM OXIDE (MAGNESIUM-OXIDE) 250 MG TABS TABLET    Take 250 mg by mouth in the morning and at bedtime    MEMANTINE (NAMENDA) 5 MG TABLET    Take 5 mg by mouth in the morning and at bedtime    POTASSIUM CHLORIDE (KLOR-CON M) 10 MEQ EXTENDED RELEASE TABLET    Take 1 tablet by mouth daily    SITAGLIPTIN (JANUVIA) 100 MG TABLET    Take 25 mg by mouth daily Take half a tablet daily       ALLERGIES     Tylenol [acetaminophen]    FAMILY HISTORY     History reviewed. No pertinent family history.        SOCIAL HISTORY       Social History Socioeconomic History    Marital status:      Spouse name: None    Number of children: None    Years of education: None    Highest education level: None   Occupational History    None   Tobacco Use    Smoking status: Former Smoker    Smokeless tobacco: Never Used   Vaping Use    Vaping Use: Never used   Substance and Sexual Activity    Alcohol use: Not Currently    Drug use: Never    Sexual activity: Not Currently   Other Topics Concern    None   Social History Narrative    None     Social Determinants of Health     Financial Resource Strain:     Difficulty of Paying Living Expenses: Not on file   Food Insecurity:     Worried About Running Out of Food in the Last Year: Not on file    Kyle of Food in the Last Year: Not on file   Transportation Needs:     Lack of Transportation (Medical): Not on file    Lack of Transportation (Non-Medical):  Not on file   Physical Activity:     Days of Exercise per Week: Not on file    Minutes of Exercise per Session: Not on file   Stress:     Feeling of Stress : Not on file   Social Connections:     Frequency of Communication with Friends and Family: Not on file    Frequency of Social Gatherings with Friends and Family: Not on file    Attends Zoroastrianism Services: Not on file    Active Member of [address] [place] [address] [employer] or Organizations: Not on file    Attends Club or Organization Meetings: Not on file    Marital Status: Not on file   Intimate Partner Violence:     Fear of Current or Ex-Partner: Not on file    Emotionally Abused: Not on file    Physically Abused: Not on file    Sexually Abused: Not on file   Housing Stability:     Unable to Pay for Housing in the Last Year: Not on file    Number of [place] in the Last Year: Not on file    Unstable Housing in the Last Year: Not on file       SCREENINGS    Trish Coma Scale  Eye Opening: Spontaneous  Best Verbal Response: Oriented (NORMAL BASELINE)  Best Motor Response: Obeys commands  Trish Coma Scale Score: 15 @FLOW(07011407)@      PHYSICAL EXAM    (up to 7 for level 4, 8 or more for level 5)     ED Triage Vitals   BP Temp Temp src Pulse Resp SpO2 Height Weight   05/15/22 1531 05/15/22 1533 -- 05/15/22 1531 05/15/22 1531 05/15/22 1531 -- --   136/76 97.7 °F (36.5 °C)  94 18 93 %         Physical Exam  Vitals and nursing note reviewed. Constitutional:       General: She is not in acute distress. Appearance: She is well-developed. She is not ill-appearing, toxic-appearing or diaphoretic. HENT:      Head: Normocephalic. Comments: No visible signs of injuries, no cut scrapes abrasions, no depressions, no deformity, no pain on palpation. Right Ear: Tympanic membrane normal.      Left Ear: Tympanic membrane normal.      Nose: Nose normal. No congestion. Mouth/Throat:      Mouth: Mucous membranes are moist.      Pharynx: No oropharyngeal exudate or posterior oropharyngeal erythema. Eyes:      Extraocular Movements: Extraocular movements intact. Conjunctiva/sclera: Conjunctivae normal.      Pupils: Pupils are equal, round, and reactive to light. Comments: Pupils are equal round reactive to light, at approximately 3 mm, no nystagmus. Neck:      Vascular: No JVD. Trachea: No tracheal deviation. Comments: Neck is supple, there is no midline tenderness, no step-offs, no crepitus, no deformities, full range of motion with no pain  Cardiovascular:      Rate and Rhythm: Normal rate. Pulses: Normal pulses. Heart sounds: Normal heart sounds. No murmur heard. No friction rub. No gallop. Pulmonary:      Effort: Pulmonary effort is normal. No tachypnea, accessory muscle usage, respiratory distress or retractions. Breath sounds: Normal breath sounds. No stridor. No wheezing, rhonchi or rales.       Comments: Lung sounds are clear in all fields, no wheezes rales or rhonchi, no excess muscles, retractions, room air saturations are 91%  Chest:      Chest wall: No tenderness. Abdominal:      General: Abdomen is flat. Bowel sounds are normal. There is no distension or abdominal bruit. Palpations: Abdomen is soft. There is no shifting dullness, fluid wave, hepatomegaly, splenomegaly, mass or pulsatile mass. Tenderness: There is no abdominal tenderness. There is no right CVA tenderness, left CVA tenderness, guarding or rebound. Negative signs include Kennedy's sign, Rovsing's sign and McBurney's sign. Comments: Abdomen soft nondistended nontender no guarding mass rebound, no CVA tenderness. Musculoskeletal:         General: No deformity. Cervical back: Normal range of motion and neck supple. No rigidity or tenderness. Comments: Patient is moving all extremities well, she has no pain on palpation to cervical spine, thoracic spine, lumbar spine, sacral area is stable, there is no pelvis instability, there is no shortening or rotation of bilateral lower extremities, no pain on palpation to femurs, knees, tib-fib, feet or ankles, patient has full range of motion of lower extremities without increasing pain. No pain across the shoulders humerus, elbows, wrist hand or fingers, full range of motion of upper extremities without increasing pain upper and lower extremities are neurovascular intact. Skin:     General: Skin is warm and dry. Capillary Refill: Capillary refill takes less than 2 seconds. Coloration: Skin is not jaundiced. Neurological:      General: No focal deficit present. Mental Status: She is alert. Mental status is at baseline. Cranial Nerves: No cranial nerve deficit. Sensory: No sensory deficit. Motor: No weakness.       Coordination: Coordination normal.   Psychiatric:         Mood and Affect: Mood normal.         DIAGNOSTIC RESULTS     EKG: All EKG's are interpreted by the Emergency Department Physician who either signs or Co-signsthis chart in the absence of a cardiologist.      RADIOLOGY:   Kim Dias filmimages such as CT, Ultrasound and MRI are read by the radiologist. Plain radiographic images are visualized and preliminarily interpreted by the emergency physician with the below findings:    Chest x-ray shows no acute pulmonary process    Xray of the humerus shows no acute fracture or subluxation    X-ray of the pelvis shows no acute fracture    Interpretation per the Radiologist below, if available at the time ofthis note:    CT Head WO Contrast   Final Result      CT CERVICAL SPINE WO CONTRAST   Final Result      NO ACUTE FRACTURE OR EVIDENCE OF CERVICAL SPINE INJURY IDENTIFIED. CERVICAL SPONDYLOSIS.          XR PELVIS (1-2 VIEWS)    (Results Pending)   XR CHEST PORTABLE    (Results Pending)   XR SHOULDER LEFT (MIN 2 VIEWS)    (Results Pending)   XR HUMERUS LEFT (MIN 2 VIEWS)    (Results Pending)         ED BEDSIDE ULTRASOUND:   Performed by ED Physician - none    LABS:  Labs Reviewed   COMPREHENSIVE METABOLIC PANEL - Abnormal; Notable for the following components:       Result Value    Sodium 131 (*)     Glucose 434 (*)     BUN 24 (*)     CREATININE 1.52 (*)     GFR Non- 31.8 (*)     GFR  38.5 (*)     All other components within normal limits    Narrative:     CALL  [place]  [facility] tel. [phone],  GLUCOSE results called to and read back by Bonifacio Lozoya, 05/15/2022 16:52, by  Sarahi Dobson   CBC WITH AUTO DIFFERENTIAL - Abnormal; Notable for the following components:    RBC 3.05 (*)     Hemoglobin 8.5 (*)     Hematocrit 26.0 (*)     MCHC 32.9 (*)     Monocytes Absolute 0.9 (*)     Bands Relative 2 (*)     All other components within normal limits   URINALYSIS WITH REFLEX TO CULTURE - Abnormal; Notable for the following components:    Glucose, Ur >=1000 (*)     Leukocyte Esterase, Urine SMALL (*)     All other components within normal limits   PROTIME-INR - Abnormal; Notable for the following components:    Protime 15.3 (*)     All other components within normal limits   MICROSCOPIC URINALYSIS - Abnormal; Notable for the following components:    RBC, UA 3-5 (*)     All other components within normal limits   POCT GLUCOSE - Abnormal; Notable for the following components:    POC Glucose 451 (*)     All other components within normal limits   POCT GLUCOSE - Normal   APTT       All other labs were within normal range or not returned as of this dictation. EMERGENCY DEPARTMENT COURSE and DIFFERENTIAL DIAGNOSIS/MDM:   Vitals:    Vitals:    05/15/22 1533 05/15/22 1535 05/15/22 1715 05/15/22 1802   BP: 131/71 131/71 129/71 (!) 146/66   Pulse: 78 91 93 94   Resp: 18 18 18 18   Temp: 97.7 °F (36.5 °C) 97.7 °F (36.5 °C)     TempSrc:  Oral     SpO2: 91% 99% 97% 94%   Weight:  115 lb (52.2 kg)     Height:  5' 3\" (1.6 m)              MDM  Number of Diagnoses or Management Options  Anemia, unspecified type  Fall, initial encounter  Hyperglycemia  Diagnosis management comments: Presents emergency department with no specific complaints following a fall which occurred in her home. This was witnessed by family patient was trying to walk with her walker, trying to squeeze to a tight area, she tripped falling forward striking the floor, there was no loss of consciousness. Patient received a CT scan of the head, cervical spine, with out acute fracture or acute process. X-ray of the chest shows no acute pulmonary process, x-ray of the pelvis shows no acute fracture or subluxation. X-ray of the left shoulder and humerus showed no acute fracture or subluxation. Patient has ongoing chronic renal issues, which are followed by her regular family provider, family states they were also notified within the last 1 week the patient has some issues with anemia, and she has a follow-up appointment with her primary doctor for tomorrow for this. Her hemoglobin today is 8.5, this is down from approximately twelve 1 month ago. Patient states she does not have any symptoms, no dizziness, shortness of breath, no chest pain. I did discuss these findings with the family, and advised them that they do require further follow-up as determined a cause of her drop in her hemoglobin. On examination today, she has no obvious signs of bleeding, I did do a rectal exam, stool is light brown in color, and is guaiac negative. She is not currently on any anticoagulation with the exception of aspirin 81 mg daily. Her blood sugars were elevated during her visit today, family states this does periodically wax and wane for her, and they believe that there are times when patient forgets to take her medication. She does have underlying history of dementia. She was given insulin while in emerge apartment, at this time is felt patient safe to be discharged home, she does have a follow-up visit with her primary doctor tomorrow which family was advised to keep. Should she have any worsening or change symptoms, she was advised to return to the [facility] department. Amount and/or Complexity of Data Reviewed  Decide to obtain previous medical records or to obtain history from someone other than the patient: yes        CRITICAL CARE TIME   Total Critical Care time was 0 minutes, excluding separately reportableprocedures. There was a high probability of clinicallysignificant/life threatening deterioration in the patient's condition which required my urgent intervention. CONSULTS:  None    PROCEDURES:  Unless otherwise noted below, none     Procedures    FINAL IMPRESSION      1. Fall, initial encounter    2. Anemia, unspecified type    3.  Hyperglycemia          DISPOSITION/PLAN   DISPOSITION        PATIENT REFERRED TO:  Marychuy Johnson MD  [address] [facility] [address] [phone]    In 2 days        DISCHARGE MEDICATIONS:  New Prescriptions    No medications on file          (Please note that portions of this note were completed with a voice recognition program.  Efforts were made to edit the dictations but occasionally words are mis-transcribed.)    Richelle Waite PA-C (electronically signed)  Attending Emergency Physician         Richelle Waite PA-C  05/15/22 [id number]

## 2022-05-15 NOTE — ED TRIAGE NOTES
Pt arrived via EMS from home d/t fall from standing, per family the pt was walking to the restroom with her walker when her foot had gotten caught causing her to fall. When the pt fell she fell forward landing face first, there are no signs of injury to her face. Pt was c/o of left arm pain after the fall but denies any pain now. Pt's family doesn't know if there was any LOC because after the fall she ran to the phone to call 911. When she returned to the pt the pt was moving and talking. EMS advised pt's glucose was 495, there is a fresh looking abrasion on the pt's left elbow. Pt has Hx of dementia, her normal baseline is A&O x2.  Pt has no bleeding, breathing is unlabored, pulses and color are good.

## 2022-05-15 NOTE — ED NOTES
Pt's daughter was given discharge instructions by MARK Bruce, pt was wheelchair to the parking lot. Pt is in a good mood, happy to go home and eat. Pt's daughter was instructed by PA to check pt blood sugar after she eats.      Neena Post RN  05/15/22 2074

## 2022-05-15 NOTE — ED NOTES
Pt is doing well with family at bedside. Pt is attempting to urinate at this time using a purewick.      Rocio Sewell RN  05/15/22 [id number]

## 2022-05-16 ENCOUNTER — HOSPITAL ENCOUNTER (OUTPATIENT)
Dept: CARDIOLOGY | Age: 87
Discharge: HOME | End: 2022-05-16
Payer: MEDICARE

## 2022-05-16 PROCEDURE — 93296 REM INTERROG EVL PM/IDS: CPT

## 2022-05-23 NOTE — PROGRESS NOTES
2022    TELEHEALTH EVALUATION -- Audio/Visual (During ERSPG-50 public health emergency)      Youlanda Castleman (:  1928) has requested an audio/video evaluation and was evaluated through a real time audio-video encounter. The patient ( or guardian if applicable ) is aware that this is a billable service, which includes applicable co-pays. The virtual visit was conducted with the patient's ( or legal guardian's if applicable ) consent. The visit was conducted pursuant to the emergency declaration under the [address] [place] [address], [address] [place] [address] authority and the [employer] Act. Patient identification was verified, and a caregiver was present when appropriate. The patient was located in a state where the provider was licensed to provide care. The patient was located at an establishment in a state where the provider was licensed to provide care. Due to this being a TeleHealth encounter, evaluation of the following organ systems is limited: Vitals/Constitutional/EENT/Resp/CV/GI//MS/Neuro/Skin/Heme-Lymph-Imm.}  Infectious Disease Progress Note       Patient is a followup regarding regarding  R 2nd toe acute OM  MRSE infection  R 2nd toe DFU, non healing wound  PVD with hx of recent angio    Has been on Zyvox PO x 4 weeks, now off of abx. Per daughter, patient is having a pain that feels like a \"marble\" to the bottom of her L foot. It is painful and per the daughter, she is unable to properly ambulate. She would like to see her podiatrist asap. They were offered an upcoming appointment for next week.           Lab Results   Component Value Date    WBC 6.4 05/15/2022    HGB 8.5 (L) 05/15/2022    HCT 26.0 (L) 05/15/2022    MCV 85.2 05/15/2022     05/15/2022     Lab Results   Component Value Date     05/15/2022    K 4.6 05/15/2022    K 3.8 2021    CL 95 05/15/2022    CO2 21 05/15/2022    BUN 24 05/15/2022    CREATININE 1.52 05/15/2022    GLUCOSE 451 05/15/2022    GLUCOSE 74 06/08/2021    CALCIUM 8.7 05/15/2022        WBC trends are being monitored. Antibiotic doses are being adjusted per most recent renal labs. Patient Active Problem List   Diagnosis    Ischemic cardiomyopathy    CAD (coronary artery disease)    Hypertension    Pure hypercholesterolemia    Closed nondisp transvrs fracture of right patella with routine healing    Diabetes mellitus ([address]    Pacemaker    Cardiomyopathy ([address]    Closed fracture of right distal radius and ulna    Closed nondisplaced transverse fracture of right patella with routine healing    Type 2 diabetes mellitus with hyperosmolar hyperglycemic state (HHS) [facility]    Hyperglycemia     Since we cannot conduct an in-person exam, the following were addressed with the patient to the best of my capability via virtual visit:   No distress. Normal general appearance  Patient does not perceive any new visual deficits  No scleral icterus or nasal congestion  No obvious exudate Otic, OP or NP  No obvious cervical adenopathy. No diaphoresis or flushing in the face  Patient is able to flex and extend the neck with ease. Patient can inhale and exhale without any difficulty and chest seems to be expanding symmetrically. No conversational dyspnea. Patient does not feel any palpitations   Patient's abdomen is not protruberant beyond their normal size. No new swelling of their joints, no rigidity. No calf pain  No observed neurological changes or slurred speech when speaking to the patient, cranial nerves appear intact. No new skin rash or ulcers   Mood and Affect: Mood normal with appropriate behavior in general. Appropriate responses pertaining to the conversation      Sitting in a chair. No bleeding wounds. No purulence noted. ASSESSMENT:  R 2nd toe OM   R 2nd toe DFU, non healing wound  Acute foot pain  PVD      PLAN:  Follow up with podiatry next week.  We will follow up as needed. She is off of abx now. Case discussed with her daughter accompanying her at length. Imaging and labs were reviewed per medical records and any ID pertinent labs were also addressed  Time spent today in combination of reviewing patient's chart, medications, labs, pictures when pertinent, provider communication as necessary, counseling patient, care/coordination not otherwise reported here, and patient face to face virtual visit 30 min.   >50% of that time spent counseling and coordination of patient care  Addressed preventive measures such as vaccinations, the importance of annual exam with the PCP, and the importance of health maintenance by dietary and exercise regimens. All questions were answered from an ID perspective and to the best of my ability. Social distancing measures, the importance of face masks that properly cover the nose and mouth in public, and proper hand hygiene will continue to be addressed    Risks and benefits of ID prescribed medications were discussed with patient or supporting staff caring for the patient as appropriate and feedback obtained.      Danielle Andre, DO

## 2022-08-17 ENCOUNTER — HOSPITAL ENCOUNTER (OUTPATIENT)
Dept: CARDIOLOGY | Age: 87
Discharge: HOME | End: 2022-08-17
Payer: MEDICARE

## 2022-08-17 PROCEDURE — 93296 REM INTERROG EVL PM/IDS: CPT

## 2022-08-22 ENCOUNTER — OFFICE VISIT (OUTPATIENT)
Dept: GERIATRIC MEDICINE | Age: 87
End: 2022-08-22
Payer: MEDICARE

## 2022-08-22 DIAGNOSIS — E11.65 HYPERGLYCEMIA DUE TO DIABETES MELLITUS (HCC): ICD-10-CM

## 2022-08-22 DIAGNOSIS — Z79.4 TYPE 2 DIABETES MELLITUS WITH HYPEROSMOLARITY WITHOUT COMA, WITH LONG-TERM CURRENT USE OF INSULIN (HCC): Primary | ICD-10-CM

## 2022-08-22 DIAGNOSIS — E11.00 TYPE 2 DIABETES MELLITUS WITH HYPEROSMOLARITY WITHOUT COMA, WITH LONG-TERM CURRENT USE OF INSULIN (HCC): Primary | ICD-10-CM

## 2022-08-22 PROCEDURE — 3046F HEMOGLOBIN A1C LEVEL >9.0%: CPT | Performed by: PHYSICIAN ASSISTANT

## 2022-08-22 PROCEDURE — 1090F PRES/ABSN URINE INCON ASSESS: CPT | Performed by: PHYSICIAN ASSISTANT

## 2022-08-22 PROCEDURE — 1123F ACP DISCUSS/DSCN MKR DOCD: CPT | Performed by: PHYSICIAN ASSISTANT

## 2022-08-22 PROCEDURE — G8420 CALC BMI NORM PARAMETERS: HCPCS | Performed by: PHYSICIAN ASSISTANT

## 2022-08-24 ENCOUNTER — OFFICE VISIT (OUTPATIENT)
Dept: GERIATRIC MEDICINE | Age: 87
End: 2022-08-24
Payer: MEDICARE

## 2022-08-24 DIAGNOSIS — E11.00 TYPE 2 DIABETES MELLITUS WITH HYPEROSMOLAR HYPERGLYCEMIC STATE (HHS) (HCC): Primary | ICD-10-CM

## 2022-08-24 PROCEDURE — 1123F ACP DISCUSS/DSCN MKR DOCD: CPT | Performed by: PHYSICIAN ASSISTANT

## 2022-08-26 ENCOUNTER — OFFICE VISIT (OUTPATIENT)
Dept: GERIATRIC MEDICINE | Age: 87
End: 2022-08-26
Payer: MEDICARE

## 2022-08-26 DIAGNOSIS — N18.32 TYPE 2 DIABETES MELLITUS WITH STAGE 3B CHRONIC KIDNEY DISEASE, WITH LONG-TERM CURRENT USE OF INSULIN (HCC): Primary | ICD-10-CM

## 2022-08-26 DIAGNOSIS — E11.22 TYPE 2 DIABETES MELLITUS WITH STAGE 3B CHRONIC KIDNEY DISEASE, WITH LONG-TERM CURRENT USE OF INSULIN (HCC): Primary | ICD-10-CM

## 2022-08-26 DIAGNOSIS — Z79.4 TYPE 2 DIABETES MELLITUS WITH STAGE 3B CHRONIC KIDNEY DISEASE, WITH LONG-TERM CURRENT USE OF INSULIN (HCC): Primary | ICD-10-CM

## 2022-08-26 DIAGNOSIS — Z91.89 AT RISK FOR HYPOGLYCEMIA: ICD-10-CM

## 2022-08-26 PROCEDURE — 1090F PRES/ABSN URINE INCON ASSESS: CPT | Performed by: PHYSICIAN ASSISTANT

## 2022-08-26 PROCEDURE — 3046F HEMOGLOBIN A1C LEVEL >9.0%: CPT | Performed by: PHYSICIAN ASSISTANT

## 2022-08-26 PROCEDURE — 1123F ACP DISCUSS/DSCN MKR DOCD: CPT | Performed by: PHYSICIAN ASSISTANT

## 2022-08-26 PROCEDURE — G8420 CALC BMI NORM PARAMETERS: HCPCS | Performed by: PHYSICIAN ASSISTANT

## 2022-08-29 ENCOUNTER — APPOINTMENT (OUTPATIENT)
Dept: GENERAL RADIOLOGY | Age: 87
DRG: 480 | End: 2022-08-29
Payer: MEDICARE

## 2022-08-29 ENCOUNTER — HOSPITAL ENCOUNTER (INPATIENT)
Age: 87
LOS: 5 days | Discharge: NURSING, REHAB OR LONG-TERM CARE | DRG: 480 | End: 2022-09-03
Attending: STUDENT IN AN ORGANIZED HEALTH CARE EDUCATION/TRAINING PROGRAM | Admitting: SURGERY
Payer: MEDICARE

## 2022-08-29 ENCOUNTER — APPOINTMENT (OUTPATIENT)
Dept: CT IMAGING | Age: 87
DRG: 480 | End: 2022-08-29
Payer: MEDICARE

## 2022-08-29 DIAGNOSIS — G89.18 POST-OP PAIN: ICD-10-CM

## 2022-08-29 DIAGNOSIS — S72.492A OTHER CLOSED FRACTURE OF DISTAL END OF LEFT FEMUR, INITIAL ENCOUNTER (HCC): Primary | ICD-10-CM

## 2022-08-29 DIAGNOSIS — G89.11 ACUTE PAIN DUE TO TRAUMA: ICD-10-CM

## 2022-08-29 DIAGNOSIS — S72.351P CLOSED DISP COMMINUTED FRACTURE OF SHAFT OF RIGHT FEMUR WITH MALUNION: ICD-10-CM

## 2022-08-29 LAB
ALBUMIN SERPL-MCNC: 3.7 G/DL (ref 3.5–4.6)
ALP BLD-CCNC: 208 U/L (ref 40–130)
ALT SERPL-CCNC: 12 U/L (ref 0–33)
ANION GAP SERPL CALCULATED.3IONS-SCNC: 10 MEQ/L (ref 9–15)
ANISOCYTOSIS: ABNORMAL
APTT: 31.3 SEC (ref 24.4–36.8)
AST SERPL-CCNC: 26 U/L (ref 0–35)
BASOPHILS ABSOLUTE: 0 K/UL (ref 0–0.2)
BASOPHILS RELATIVE PERCENT: 0.9 %
BILIRUB SERPL-MCNC: <0.2 MG/DL (ref 0.2–0.7)
BUN BLDV-MCNC: 27 MG/DL (ref 8–23)
CALCIUM SERPL-MCNC: 9.2 MG/DL (ref 8.5–9.9)
CHLORIDE BLD-SCNC: 101 MEQ/L (ref 95–107)
CO2: 27 MEQ/L (ref 20–31)
CREAT SERPL-MCNC: 1.32 MG/DL (ref 0.5–0.9)
EOSINOPHILS ABSOLUTE: 0.2 K/UL (ref 0–0.7)
EOSINOPHILS RELATIVE PERCENT: 2 %
GFR AFRICAN AMERICAN: 45.3
GFR SERPL CREATININE-BSD FRML MDRD: 37.3 ML/MIN/{1.73_M2}
GLOBULIN: 3.3 G/DL (ref 2.3–3.5)
GLUCOSE BLD-MCNC: 302 MG/DL (ref 70–99)
GLUCOSE BLD-MCNC: 407 MG/DL (ref 70–99)
GLUCOSE BLD-MCNC: 478 MG/DL (ref 70–99)
HCT VFR BLD CALC: 40.2 % (ref 37–47)
HEMOGLOBIN: 13.6 G/DL (ref 12–16)
INR BLD: 1
LYMPHOCYTES ABSOLUTE: 0.8 K/UL (ref 1–4.8)
LYMPHOCYTES RELATIVE PERCENT: 9 %
MCH RBC QN AUTO: 29.1 PG (ref 27–31.3)
MCHC RBC AUTO-ENTMCNC: 33.8 % (ref 33–37)
MCV RBC AUTO: 86.1 FL (ref 82–100)
MICROCYTES: ABNORMAL
MONOCYTES ABSOLUTE: 0.4 K/UL (ref 0.2–0.8)
MONOCYTES RELATIVE PERCENT: 4.7 %
NEUTROPHILS ABSOLUTE: 7.1 K/UL (ref 1.4–6.5)
NEUTROPHILS RELATIVE PERCENT: 84 %
PDW BLD-RTO: 14.4 % (ref 11.5–14.5)
PERFORMED ON: ABNORMAL
PERFORMED ON: ABNORMAL
PLATELET # BLD: 197 K/UL (ref 130–400)
PLATELET SLIDE REVIEW: NORMAL
POTASSIUM SERPL-SCNC: 4.8 MEQ/L (ref 3.4–4.9)
PROTHROMBIN TIME: 13.2 SEC (ref 12.3–14.9)
RBC # BLD: 4.66 M/UL (ref 4.2–5.4)
SODIUM BLD-SCNC: 138 MEQ/L (ref 135–144)
TOTAL PROTEIN: 7 G/DL (ref 6.3–8)
WBC # BLD: 8.4 K/UL (ref 4.8–10.8)

## 2022-08-29 PROCEDURE — 99221 1ST HOSP IP/OBS SF/LOW 40: CPT | Performed by: STUDENT IN AN ORGANIZED HEALTH CARE EDUCATION/TRAINING PROGRAM

## 2022-08-29 PROCEDURE — 73564 X-RAY EXAM KNEE 4 OR MORE: CPT

## 2022-08-29 PROCEDURE — 6360000002 HC RX W HCPCS: Performed by: PHYSICIAN ASSISTANT

## 2022-08-29 PROCEDURE — 73552 X-RAY EXAM OF FEMUR 2/>: CPT

## 2022-08-29 PROCEDURE — 2580000003 HC RX 258: Performed by: STUDENT IN AN ORGANIZED HEALTH CARE EDUCATION/TRAINING PROGRAM

## 2022-08-29 PROCEDURE — 6370000000 HC RX 637 (ALT 250 FOR IP): Performed by: SURGERY

## 2022-08-29 PROCEDURE — 73700 CT LOWER EXTREMITY W/O DYE: CPT

## 2022-08-29 PROCEDURE — 85610 PROTHROMBIN TIME: CPT

## 2022-08-29 PROCEDURE — 2580000003 HC RX 258: Performed by: PHYSICIAN ASSISTANT

## 2022-08-29 PROCEDURE — 85730 THROMBOPLASTIN TIME PARTIAL: CPT

## 2022-08-29 PROCEDURE — 1210000000 HC MED SURG R&B

## 2022-08-29 PROCEDURE — 29505 APPLICATION LONG LEG SPLINT: CPT

## 2022-08-29 PROCEDURE — 80053 COMPREHEN METABOLIC PANEL: CPT

## 2022-08-29 PROCEDURE — 85025 COMPLETE CBC W/AUTO DIFF WBC: CPT

## 2022-08-29 PROCEDURE — 6370000000 HC RX 637 (ALT 250 FOR IP): Performed by: STUDENT IN AN ORGANIZED HEALTH CARE EDUCATION/TRAINING PROGRAM

## 2022-08-29 PROCEDURE — 96374 THER/PROPH/DIAG INJ IV PUSH: CPT

## 2022-08-29 PROCEDURE — 36415 COLL VENOUS BLD VENIPUNCTURE: CPT

## 2022-08-29 PROCEDURE — 73503 X-RAY EXAM HIP UNI 4/> VIEWS: CPT

## 2022-08-29 PROCEDURE — 99285 EMERGENCY DEPT VISIT HI MDM: CPT

## 2022-08-29 PROCEDURE — 71045 X-RAY EXAM CHEST 1 VIEW: CPT

## 2022-08-29 PROCEDURE — 93005 ELECTROCARDIOGRAM TRACING: CPT | Performed by: PHYSICIAN ASSISTANT

## 2022-08-29 PROCEDURE — 76376 3D RENDER W/INTRP POSTPROCES: CPT

## 2022-08-29 PROCEDURE — 6830039000 HC L3 TRAUMA ALERT

## 2022-08-29 RX ORDER — MIRTAZAPINE 15 MG/1
7.5 TABLET, FILM COATED ORAL NIGHTLY
Status: DISCONTINUED | OUTPATIENT
Start: 2022-08-29 | End: 2022-09-04 | Stop reason: HOSPADM

## 2022-08-29 RX ORDER — MIRTAZAPINE 15 MG/1
7.5 TABLET, FILM COATED ORAL NIGHTLY
COMMUNITY

## 2022-08-29 RX ORDER — ONDANSETRON 2 MG/ML
4 INJECTION INTRAMUSCULAR; INTRAVENOUS EVERY 6 HOURS PRN
Status: DISCONTINUED | OUTPATIENT
Start: 2022-08-29 | End: 2022-08-29

## 2022-08-29 RX ORDER — DEXTROSE MONOHYDRATE 100 MG/ML
INJECTION, SOLUTION INTRAVENOUS CONTINUOUS PRN
Status: DISCONTINUED | OUTPATIENT
Start: 2022-08-29 | End: 2022-09-04 | Stop reason: HOSPADM

## 2022-08-29 RX ORDER — METHOCARBAMOL 500 MG/1
500 TABLET, FILM COATED ORAL EVERY 6 HOURS
Status: DISCONTINUED | OUTPATIENT
Start: 2022-08-29 | End: 2022-09-01

## 2022-08-29 RX ORDER — FENTANYL CITRATE 50 UG/ML
25 INJECTION, SOLUTION INTRAMUSCULAR; INTRAVENOUS
Status: DISCONTINUED | OUTPATIENT
Start: 2022-08-29 | End: 2022-08-29

## 2022-08-29 RX ORDER — OXYCODONE HYDROCHLORIDE 5 MG/1
5 TABLET ORAL EVERY 4 HOURS PRN
Status: DISCONTINUED | OUTPATIENT
Start: 2022-08-29 | End: 2022-08-30

## 2022-08-29 RX ORDER — SODIUM CHLORIDE 0.9 % (FLUSH) 0.9 %
5-40 SYRINGE (ML) INJECTION PRN
Status: DISCONTINUED | OUTPATIENT
Start: 2022-08-29 | End: 2022-09-04 | Stop reason: HOSPADM

## 2022-08-29 RX ORDER — LANOLIN ALCOHOL/MO/W.PET/CERES
400 CREAM (GRAM) TOPICAL 2 TIMES DAILY
Status: DISCONTINUED | OUTPATIENT
Start: 2022-08-29 | End: 2022-09-04 | Stop reason: HOSPADM

## 2022-08-29 RX ORDER — INSULIN GLARGINE 100 [IU]/ML
17 INJECTION, SOLUTION SUBCUTANEOUS DAILY
Status: DISCONTINUED | OUTPATIENT
Start: 2022-08-30 | End: 2022-09-04 | Stop reason: HOSPADM

## 2022-08-29 RX ORDER — INSULIN GLARGINE 100 [IU]/ML
6 INJECTION, SOLUTION SUBCUTANEOUS NIGHTLY
Status: DISCONTINUED | OUTPATIENT
Start: 2022-08-29 | End: 2022-09-04 | Stop reason: HOSPADM

## 2022-08-29 RX ORDER — HEPARIN SODIUM 5000 [USP'U]/ML
5000 INJECTION, SOLUTION INTRAVENOUS; SUBCUTANEOUS 2 TIMES DAILY
Status: DISCONTINUED | OUTPATIENT
Start: 2022-08-29 | End: 2022-08-30

## 2022-08-29 RX ORDER — FENTANYL CITRATE 50 UG/ML
50 INJECTION, SOLUTION INTRAMUSCULAR; INTRAVENOUS
Status: DISCONTINUED | OUTPATIENT
Start: 2022-08-29 | End: 2022-08-29

## 2022-08-29 RX ORDER — ISOSORBIDE MONONITRATE 60 MG/1
30 TABLET, EXTENDED RELEASE ORAL DAILY
Status: DISCONTINUED | OUTPATIENT
Start: 2022-08-30 | End: 2022-09-02

## 2022-08-29 RX ORDER — INSULIN LISPRO 100 [IU]/ML
0-4 INJECTION, SOLUTION INTRAVENOUS; SUBCUTANEOUS NIGHTLY
Status: DISCONTINUED | OUTPATIENT
Start: 2022-08-29 | End: 2022-09-04 | Stop reason: HOSPADM

## 2022-08-29 RX ORDER — FENTANYL CITRATE 50 UG/ML
50 INJECTION, SOLUTION INTRAMUSCULAR; INTRAVENOUS ONCE
Status: COMPLETED | OUTPATIENT
Start: 2022-08-29 | End: 2022-08-29

## 2022-08-29 RX ORDER — MECOBALAMIN 5000 MCG
5 TABLET,DISINTEGRATING ORAL NIGHTLY
Status: DISCONTINUED | OUTPATIENT
Start: 2022-08-29 | End: 2022-09-04 | Stop reason: HOSPADM

## 2022-08-29 RX ORDER — SENNA AND DOCUSATE SODIUM 50; 8.6 MG/1; MG/1
1 TABLET, FILM COATED ORAL 2 TIMES DAILY
Status: DISCONTINUED | OUTPATIENT
Start: 2022-08-29 | End: 2022-09-04 | Stop reason: HOSPADM

## 2022-08-29 RX ORDER — INSULIN LISPRO 100 [IU]/ML
0-8 INJECTION, SOLUTION INTRAVENOUS; SUBCUTANEOUS
Status: DISCONTINUED | OUTPATIENT
Start: 2022-08-29 | End: 2022-09-04 | Stop reason: HOSPADM

## 2022-08-29 RX ORDER — ONDANSETRON 4 MG/1
4 TABLET, ORALLY DISINTEGRATING ORAL EVERY 8 HOURS PRN
Status: DISCONTINUED | OUTPATIENT
Start: 2022-08-29 | End: 2022-08-29

## 2022-08-29 RX ORDER — DONEPEZIL HYDROCHLORIDE 5 MG/1
5 TABLET, FILM COATED ORAL NIGHTLY
COMMUNITY

## 2022-08-29 RX ORDER — SODIUM CHLORIDE 9 MG/ML
INJECTION, SOLUTION INTRAVENOUS PRN
Status: DISCONTINUED | OUTPATIENT
Start: 2022-08-29 | End: 2022-09-04 | Stop reason: HOSPADM

## 2022-08-29 RX ORDER — INSULIN GLARGINE 100 [IU]/ML
6 INJECTION, SOLUTION SUBCUTANEOUS NIGHTLY
COMMUNITY

## 2022-08-29 RX ORDER — OXYCODONE HYDROCHLORIDE 5 MG/1
2.5 TABLET ORAL EVERY 4 HOURS PRN
Status: DISCONTINUED | OUTPATIENT
Start: 2022-08-29 | End: 2022-08-30

## 2022-08-29 RX ORDER — ASPIRIN 81 MG/1
81 TABLET ORAL DAILY
Status: DISCONTINUED | OUTPATIENT
Start: 2022-08-30 | End: 2022-09-01

## 2022-08-29 RX ORDER — MEMANTINE HYDROCHLORIDE 5 MG/1
5 TABLET ORAL 2 TIMES DAILY
Status: DISCONTINUED | OUTPATIENT
Start: 2022-08-29 | End: 2022-09-04 | Stop reason: HOSPADM

## 2022-08-29 RX ORDER — SODIUM CHLORIDE 0.9 % (FLUSH) 0.9 %
5-40 SYRINGE (ML) INJECTION EVERY 12 HOURS SCHEDULED
Status: DISCONTINUED | OUTPATIENT
Start: 2022-08-29 | End: 2022-09-04 | Stop reason: HOSPADM

## 2022-08-29 RX ORDER — INSULIN LISPRO 100 [IU]/ML
10 INJECTION, SOLUTION INTRAVENOUS; SUBCUTANEOUS ONCE
Status: COMPLETED | OUTPATIENT
Start: 2022-08-29 | End: 2022-08-29

## 2022-08-29 RX ORDER — DONEPEZIL HYDROCHLORIDE 5 MG/1
5 TABLET, FILM COATED ORAL NIGHTLY
Status: DISCONTINUED | OUTPATIENT
Start: 2022-08-30 | End: 2022-09-04 | Stop reason: HOSPADM

## 2022-08-29 RX ORDER — CARVEDILOL 3.12 MG/1
6.25 TABLET ORAL 2 TIMES DAILY
Status: DISCONTINUED | OUTPATIENT
Start: 2022-08-29 | End: 2022-09-04 | Stop reason: HOSPADM

## 2022-08-29 RX ORDER — POLYETHYLENE GLYCOL 3350 17 G/17G
17 POWDER, FOR SOLUTION ORAL DAILY PRN
Status: DISCONTINUED | OUTPATIENT
Start: 2022-08-29 | End: 2022-09-04 | Stop reason: HOSPADM

## 2022-08-29 RX ORDER — SODIUM CHLORIDE, SODIUM LACTATE, POTASSIUM CHLORIDE, CALCIUM CHLORIDE 600; 310; 30; 20 MG/100ML; MG/100ML; MG/100ML; MG/100ML
INJECTION, SOLUTION INTRAVENOUS CONTINUOUS
Status: DISCONTINUED | OUTPATIENT
Start: 2022-08-30 | End: 2022-08-31

## 2022-08-29 RX ADMIN — INSULIN GLARGINE 6 UNITS: 100 INJECTION, SOLUTION SUBCUTANEOUS at 20:50

## 2022-08-29 RX ADMIN — FENTANYL CITRATE 50 MCG: 50 INJECTION, SOLUTION INTRAMUSCULAR; INTRAVENOUS at 10:43

## 2022-08-29 RX ADMIN — INSULIN LISPRO 4 UNITS: 100 INJECTION, SOLUTION INTRAVENOUS; SUBCUTANEOUS at 20:49

## 2022-08-29 RX ADMIN — MIRTAZAPINE 7.5 MG: 15 TABLET, FILM COATED ORAL at 20:44

## 2022-08-29 RX ADMIN — METHOCARBAMOL 500 MG: 500 TABLET ORAL at 22:58

## 2022-08-29 RX ADMIN — Medication 10 ML: at 20:47

## 2022-08-29 RX ADMIN — MEMANTINE 5 MG: 5 TABLET ORAL at 20:45

## 2022-08-29 RX ADMIN — Medication 5 MG: at 20:44

## 2022-08-29 RX ADMIN — SENNOSIDES AND DOCUSATE SODIUM 1 TABLET: 50; 8.6 TABLET ORAL at 20:45

## 2022-08-29 RX ADMIN — CARVEDILOL 6.25 MG: 3.12 TABLET, FILM COATED ORAL at 20:46

## 2022-08-29 RX ADMIN — SODIUM CHLORIDE, POTASSIUM CHLORIDE, SODIUM LACTATE AND CALCIUM CHLORIDE: 600; 310; 30; 20 INJECTION, SOLUTION INTRAVENOUS at 23:19

## 2022-08-29 RX ADMIN — INSULIN LISPRO 10 UNITS: 100 INJECTION, SOLUTION INTRAVENOUS; SUBCUTANEOUS at 22:58

## 2022-08-29 RX ADMIN — Medication 400 MG: at 20:46

## 2022-08-29 ASSESSMENT — PAIN DESCRIPTION - DESCRIPTORS
DESCRIPTORS: ACHING;SHARP;SORE
DESCRIPTORS: ACHING;SHARP;SORE;SPASM

## 2022-08-29 ASSESSMENT — PAIN - FUNCTIONAL ASSESSMENT: PAIN_FUNCTIONAL_ASSESSMENT: 0-10

## 2022-08-29 ASSESSMENT — PAIN DESCRIPTION - LOCATION
LOCATION: HIP
LOCATION: LEG

## 2022-08-29 ASSESSMENT — ENCOUNTER SYMPTOMS
ABDOMINAL DISTENTION: 0
COLOR CHANGE: 0
RHINORRHEA: 0
EYE DISCHARGE: 0
SHORTNESS OF BREATH: 0
CONSTIPATION: 0
ABDOMINAL PAIN: 0
SORE THROAT: 0

## 2022-08-29 ASSESSMENT — PAIN SCALES - GENERAL
PAINLEVEL_OUTOF10: 8
PAINLEVEL_OUTOF10: 6

## 2022-08-29 ASSESSMENT — PAIN DESCRIPTION - ORIENTATION
ORIENTATION: LEFT
ORIENTATION: LEFT

## 2022-08-29 ASSESSMENT — PAIN DESCRIPTION - PAIN TYPE: TYPE: ACUTE PAIN

## 2022-08-29 NOTE — CONSULTS
Consult Note  Patient: Ricehlle Sneed  Unit/Bed: 12/12  YOB: 1928  MRN: [id number]  Acct: [de-identified]   Admitting Diagnosis: Closed disp comminuted fracture of shaft of right femur with malunion [S72.351P]  Date:  8/29/2022  Hospital Day: 0    Complaint:  Left knee pain    History of Present Illness: This is a 70-year-old female who lives in [facility]. She apparently tripped over furniture today fell injuring her left knee. She was brought to the emergency department with a complaint of knee pain and the inability to ambulate. The patient is very hard of hearing and has some dementia. She complains of left lower extremity pain. PMHx:  Past Medical History:   Diagnosis Date    Anemia     Atrial fibrillation ([facility]     CAD (coronary artery disease)     Cardiomyopathy ([facility]     CHF (congestive heart failure) ([facility]     Diabetes mellitus ([facility]     Hypertension        PSHx:  Past Surgical History:   Procedure Laterality Date    APPENDECTOMY      CORONARY ANGIOPLASTY WITH STENT PLACEMENT      FOREARM SURGERY Right 5/13/2021    CLOSED  REDUCTION INTERNAL FIXATION RIGHT  DISTAL RADIUS PINNING performed by Graham Damon MD at [address] Left 12/2012    PACEMAKER INSERTION      TONSILLECTOMY      WRIST FRACTURE SURGERY Left 12/2012       Social Hx:  Social History     Socioeconomic History    Marital status:      Spouse name: None    Number of children: None    Years of education: None    Highest education level: None   Tobacco Use    Smoking status: Former    Smokeless tobacco: Never   Vaping Use    Vaping Use: Never used   Substance and Sexual Activity    Alcohol use: Not Currently    Drug use: Never    Sexual activity: Not Currently       Family Hx:  History reviewed. No pertinent family history.     Review of Systems:   Review of Systems      Physical Examination:    BP (!) 161/50   Pulse 55   Temp 97.6 °F (36.4 °C) (Oral)   Resp 16   Ht 5' 3\" (1.6 m)   Wt 110 lb (49.9 kg)   SpO2 99%   BMI 19.49 kg/m²    Physical Exam  Neck: Supple and nontender  Upper extremities: There is no tenderness over the clavicles bilaterally. Bilateral shoulder elbow and wrist motion was nontender. No deformities. Right lower extremity: Right hip knee and ankle motion were nontender. There was no crepitus. Left lower extremity: Logrolling of the left hip elicited minimal pain. She had tenderness to palpation about the distal femur along with swelling. The skin is intact. She is able to move her toes and ankle on the left. The foot was pink and warm on the left.     LABS:  CBC:   Lab Results   Component Value Date/Time    WBC 8.4 08/29/2022 09:30 AM    RBC 4.66 08/29/2022 09:30 AM    HGB 13.6 08/29/2022 09:30 AM    HCT 40.2 08/29/2022 09:30 AM    MCV 86.1 08/29/2022 09:30 AM    MCH 29.1 08/29/2022 09:30 AM    MCHC 33.8 08/29/2022 09:30 AM    RDW 14.4 08/29/2022 09:30 AM     08/29/2022 09:30 AM    MPV 7.5 01/31/2014 06:20 AM     CBC with Differential:    Lab Results   Component Value Date/Time    WBC 8.4 08/29/2022 09:30 AM    RBC 4.66 08/29/2022 09:30 AM    HGB 13.6 08/29/2022 09:30 AM    HCT 40.2 08/29/2022 09:30 AM     08/29/2022 09:30 AM    MCV 86.1 08/29/2022 09:30 AM    MCH 29.1 08/29/2022 09:30 AM    MCHC 33.8 08/29/2022 09:30 AM    RDW 14.4 08/29/2022 09:30 AM    BANDSPCT 2 05/15/2022 03:45 PM    LYMPHOPCT 23.0 05/15/2022 03:45 PM    MONOPCT 13.9 05/15/2022 03:45 PM    BASOPCT 0.8 05/15/2022 03:45 PM    MONOSABS 0.9 05/15/2022 03:45 PM    LYMPHSABS 1.5 05/15/2022 03:45 PM    EOSABS 0.1 05/15/2022 03:45 PM    BASOSABS 0.0 05/15/2022 03:45 PM     CMP:    Lab Results   Component Value Date/Time     08/29/2022 09:30 AM    K 4.8 08/29/2022 09:30 AM    K 3.8 08/18/2021 06:08 AM     08/29/2022 09:30 AM    CO2 27 08/29/2022 09:30 AM    BUN 27 08/29/2022 09:30 AM    CREATININE 1.32 08/29/2022 09:30 AM    GFRAA 45.3 08/29/2022 09:30 AM    LABGLOM 37.5 08/29/2022

## 2022-08-29 NOTE — H&P
Normal appearing genitalia, Pelvis is stable to palpation;, and No blood noted at the urethral meatus;  Musculoskeletal:    Back/Spine: Thoracolumbar spinal column non-tender; no step off or deformity; Extremities: Left lower extremity in splint    PAST MEDICAL HISTORY:  Past Medical History:   Diagnosis Date    Anemia     Atrial fibrillation (HCC)     CAD (coronary artery disease)     Cardiomyopathy ([facility]     CHF (congestive heart failure) ([facility]     Diabetes mellitus ([facility]     Hypertension        PAST SURGICAL HISTORY:  Past Surgical History:   Procedure Laterality Date    APPENDECTOMY      CORONARY ANGIOPLASTY WITH STENT PLACEMENT      FOREARM SURGERY Right 5/13/2021    CLOSED  REDUCTION INTERNAL FIXATION RIGHT  DISTAL RADIUS PINNING performed by Samantha Avery MD at [address] Left 12/2012    PACEMAKER INSERTION      TONSILLECTOMY      WRIST FRACTURE SURGERY Left 12/2012       PRE-ADMISSION MEDICATIONS:   Prior to Admission medications    Medication Sig Start Date End Date Taking?  Authorizing Provider   donepezil (ARICEPT) 5 MG tablet Take 5 mg by mouth nightly   Yes Historical Provider, MD   insulin glargine (LANTUS) 100 UNIT/ML injection vial Inject 6 Units into the skin nightly   Yes Historical Provider, MD   insulin lispro (HUMALOG) 100 UNIT/ML injection vial Inject into the skin 3 times daily (before meals)   Yes Historical Provider, MD   mirtazapine (REMERON) 15 MG tablet Take 7.5 mg by mouth nightly   Yes Historical Provider, MD   memantine (NAMENDA) 5 MG tablet Take 5 mg by mouth in the morning and at bedtime 2/28/22   Historical Provider, MD   Magnesium Oxide (MAGNESIUM-OXIDE) 250 MG TABS tablet Take 250 mg by mouth in the morning and at bedtime    Historical Provider, MD   insulin glargine (LANTUS) 100 UNIT/ML injection vial Inject 8 Units into the skin 2 times daily  Patient taking differently: Inject 17 Units into the skin daily 8/19/21   Alex Muniz MD   carvedilol ([address]) 6.25 MG tablet Take 1 tablet by mouth 2 times daily 8/19/21   Robin Franco MD   isosorbide mononitrate (IMDUR) 30 MG extended release tablet Take 1 tablet by mouth daily 8/20/21   Robin Franco MD   aspirin 81 MG EC tablet Take 1 tablet by mouth daily 8/13/21   Melissa Roque MD   SITagliptin (JANUVIA) 100 MG tablet Take 25 mg by mouth daily Take half a tablet daily 8/13/21   Melissa Roque MD   glucose (GLUTOSE) 40 % GEL Take 37.5 mLs by mouth as needed (Low blood sugar less than 80) 8/13/21   Melissa Roque MD   potassium chloride (KLOR-CON M) 10 MEQ extended release tablet Take 1 tablet by mouth daily  Patient not taking: Reported on 4/11/2022 8/13/21   Melissa Roque MD       ALLERGIES:  Tylenol [acetaminophen]    SOCIAL HISTORY:   Social History     Socioeconomic History    Marital status:      Spouse name: None    Number of children: None    Years of education: None    Highest education level: None   Tobacco Use    Smoking status: Former    Smokeless tobacco: Never   Vaping Use    Vaping Use: Never used   Substance and Sexual Activity    Alcohol use: Not Currently    Drug use: Never    Sexual activity: Not Currently       FAMILY HISTORY:  History reviewed. No pertinent family history. REVIEW OF SYSTEMS:   Constitutional: Negative for weight loss  HENT: Negative for congestion, facial swelling and bloody nose  Eyes: Negative for vision changes  Respiratory: Negative for shortness of breath, difficulty breathing  Cardiovascular: Negative for chest wall pain. Gastrointestinal: Negative for abdominal distention, abdominal pain and vomiting. Genitourinary: Negative for hematuria  Musculoskeletal: Negative for gait difficulties +left leg pain  Skin: Negative for bruising, abrasions  Neurological: Negative for dizziness, weakness and light-headedness. Hematological: Negative for easy bruising/bleeding  Psychiatric/Behavioral: Negative for behavioral problems.        Except as noted above the remainder of the review of systems was reviewed and negative. BASIC LABS:   CBC with Differential:    Lab Results   Component Value Date/Time    WBC 8.4 08/29/2022 09:30 AM    RBC 4.66 08/29/2022 09:30 AM    HGB 13.6 08/29/2022 09:30 AM    HCT 40.2 08/29/2022 09:30 AM     08/29/2022 09:30 AM    MCV 86.1 08/29/2022 09:30 AM    MCH 29.1 08/29/2022 09:30 AM    MCHC 33.8 08/29/2022 09:30 AM    RDW 14.4 08/29/2022 09:30 AM    BANDSPCT 2 05/15/2022 03:45 PM    LYMPHOPCT 9.0 08/29/2022 09:30 AM    MONOPCT 4.7 08/29/2022 09:30 AM    BASOPCT 0.9 08/29/2022 09:30 AM    MONOSABS 0.4 08/29/2022 09:30 AM    LYMPHSABS 0.8 08/29/2022 09:30 AM    EOSABS 0.2 08/29/2022 09:30 AM    BASOSABS 0.0 08/29/2022 09:30 AM     CMP:   Lab Results   Component Value Date     08/29/2022    K 4.8 08/29/2022     08/29/2022    CO2 27 08/29/2022    BUN 27 (H) 08/29/2022    CREATININE 1.32 (H) 08/29/2022    GLUCOSE 302 (H) 08/29/2022    CALCIUM 9.2 08/29/2022    PROT 7.0 08/29/2022    LABALBU 3.7 08/29/2022    BILITOT <0.2 08/29/2022    ALKPHOS 208 (H) 08/29/2022    AST 26 08/29/2022    ALT 12 08/29/2022    LABGLOM 37.5 (L) 08/29/2022    GFRAA 45.3 (L) 08/29/2022    GLOB 3.3 08/29/2022     Magnesium:   Lab Results   Component Value Date/Time    MG 1.6 08/19/2021 06:57 AM     Troponin:   Lab Results   Component Value Date/Time    TROPONINI 0.016 04/08/2022 10:30 AM     PT/INR:   Recent Labs     08/29/22 0930   PROTIME 13.2   INR 1.0     APTT:   Recent Labs     08/29/22 0930   APTT 31.3     EtOH: No results found for: ETOH    RADIOLOGY: (Personally reviewed)  XR L hip:  Degenerative bone changes. No evidence of acute osseous pathology. XR L femur  1. INTERNAL FIXATION OF PROXIMAL LEFT FEMUR AND OF PATELLA. 2. COMMINUTED SUPRACONDYLAR FRACTURE DISTAL LEFT FEMUR, AS DISCUSSED    XR L knee  1. OSTEOPENIA.  2. INTERNAL FIXATION PATELLA AND FEMUR. 3. SEVERELY COMMINUTED SUPRACONDYLAR FRACTURE OF THE DISTAL LEFT FEMUR.  EXTENSION TO THE ARTICULAR CORTEX OF EITHER FEMORAL CONDYLE IS DIFFICULT TO EXCLUDE. 4. NO CONVINCING FRACTURE OF THE PROXIMAL TIBIA. 5. CORRELATION WITH CT MAY BE HELPFUL TO MORE COMPLETELY DEFINE ANY FRACTURES. XR Chest: No evidence of acute cardiopulmonary disease. CT L femur: COMMINUTED INTRA-ARTICULAR DISTAL LEFT FEMUR FRACTURES. ASSESSMENT:  Cammy Rodríguez is a 80 y.o. female with a PMHx of HTN, dementia, HLD, T2DM presents as a CAT2 trauma s/p echanical fall from standing (-)head strike, (-)LOC, (-)AC,AP. Trauma workup found:  - Acute comminuted intra-articular left distal femur fracture      PLAN:  Neurological: Acute pain due to trauma, Hx dementia   - Oxycodone 2.5/5mg q4 PRN mod/severe pain  - Robaxin 500mg q6   - Continue home donepezil 5mg daily, memantine 5mg BID, mirtazapine 7.5mg HS  - Melatonin 5mg HS     Cardiovascular: Hx HTN, afib, HLD  - Continue vital signs per unit protocol  - Continue home ASA, carvedilol 6.25mg BID, imdur 30mg daily     Respiratory: No acute concerns  - Maintain O2 sats > 92%, encourage IS. GI/Diet: No acute concerns  - Regular diet 4 carb choices. NPO at midnight  - Bowel regimen: Senokot BID, miralax daily PRN     Renal/Electrolytes: No electrolyte abnormalities. BUN/Cr elevated at 27/1. 32.  Appears to be patient's baseline  - LR @ 75ml/hr starting tonight while NPO  - Continue home mag oxide 40mg BID  - AM BMP     ID: No leukocytosis, afebrile  - No indication for abx     Heme: No acute concerns  - No indication for transfusion  - AM CBC     Endocrine: Hx T2DM  - Continue home lantus 17 Units qAM, lantus 6 units HS  - ISS AC/HS  - Glucose check AC/HS     MSK: Left distal femur fracture   - Plan for OR tomorrow with ortho  - Spines: Clear  - Weight Bearing Restrictions: NWB LLE  - Activity: Strict bedrest until OR with ortho  - PT/OT: Will consult post-op    Prophylaxis:   - SCDs, heparin 5000units BID due to kidney function      Felicita Vides, DEION  Trauma/Critical Care/General Surgery  [See treatment team sticky note for contact information]     This patient's plan of care was discussed and made in collaboration with Trauma Attending physician, Vicky Gottron, MD.

## 2022-08-29 NOTE — CARE COORDINATION
08/29/22    From: [facility] [place], [facility]    Admit: Fall with hip pain    PMH: Afib, CAD, CHF, DM, HTN    Anticipated Discharge Disposition:[facility] [place]   vs SNF    Patient Mobility or PT/OT ordered: will need ordered  Consults: Inpatient orthopedic surgery     Clinical: XR Femur- internal fixation of proximal left femur and or patella. Barriers to Discharge: Consults    Assessments: CMI and DCCOP Done.

## 2022-08-29 NOTE — ED PROVIDER NOTES
[address] [facility] ED  eMERGENCY dEPARTMENT eNCOUnter      Pt Name: Cammy Rodríguez  MRN: [id number]  [place] 5/4/1928  Date of evaluation: 8/29/2022  Provider: Rhiannon Wong PA-C    CHIEF COMPLAINT       Chief Complaint   Patient presents with    Fall     Pt to the ED via EMS from [facility] with left hip pain after fall at home. Pt is alert but very [place]. Pt unable to straighten left leg and grabbing at left hip. Pt fell in SNF by tripping over a piece of furniture. HISTORY OF PRESENT ILLNESS   (Location/Symptom, Timing/Onset,Context/Setting, Quality, Duration, Modifying Factors, Severity)  Note limiting factors. Cammy Rodríguez is a 80 y.o. female who presents to the emergency department with complaint of left hip, left femur, left knee pain, secondary to a fall at nursing home. According to staff, patient tripped over a table, falling and landing on her left hip. She denies any loss of consciousness, patient is very hard of hearing difficult to obtain information from patient, but she denies any other complaints, no head neck or back pain. Past medical history per chart review car you myopathy, congestive heart failure, coronary disease, anemia, diabetes, hypertension, atrial fibrillation. Patient states pain at this time is 6 out of 10. To the left hip, left femur, left knee. DNRCCA        HPI    NursingNotes were reviewed. REVIEW OF SYSTEMS    (2-9 systems for level 4, 10 or more for level 5)     Review of Systems   Constitutional:  Negative for activity change and appetite change. HENT:  Negative for congestion, ear discharge, ear pain, nosebleeds, rhinorrhea and sore throat. Eyes:  Negative for discharge. Respiratory:  Negative for shortness of breath. Cardiovascular:  Negative for chest pain, palpitations and leg swelling. Gastrointestinal:  Negative for abdominal distention, abdominal pain and constipation.    Genitourinary:  Negative for difficulty urinating and dysuria. Musculoskeletal:  Negative for arthralgias. Left hip, left femur, left knee pain secondary to fall   Skin:  Negative for color change. Neurological:  Negative for dizziness, syncope, numbness and headaches. Psychiatric/Behavioral:  Negative for agitation and confusion. Except as noted above the remainder of the review of systems was reviewed and negative.        PAST MEDICAL HISTORY     Past Medical History:   Diagnosis Date    Anemia     Atrial fibrillation (HCC)     CAD (coronary artery disease)     Cardiomyopathy (HCC)     CHF (congestive heart failure) ([facility]     Diabetes mellitus ([facility]     Hypertension          SURGICALHISTORY       Past Surgical History:   Procedure Laterality Date    APPENDECTOMY      CORONARY ANGIOPLASTY WITH STENT PLACEMENT      FOREARM SURGERY Right 5/13/2021    CLOSED  REDUCTION INTERNAL FIXATION RIGHT  DISTAL RADIUS PINNING performed by Meera Flores MD at [address] Left 12/2012    PACEMAKER INSERTION      TONSILLECTOMY      WRIST FRACTURE SURGERY Left 12/2012         CURRENT MEDICATIONS       Previous Medications    ASPIRIN 81 MG EC TABLET    Take 1 tablet by mouth daily    CARVEDILOL (COREG) 6.25 MG TABLET    Take 1 tablet by mouth 2 times daily    DONEPEZIL (ARICEPT) 5 MG TABLET    Take 5 mg by mouth nightly    GLUCOSE (GLUTOSE) 40 % GEL    Take 37.5 mLs by mouth as needed (Low blood sugar less than 80)    INSULIN GLARGINE (LANTUS) 100 UNIT/ML INJECTION VIAL    Inject 8 Units into the skin 2 times daily    ISOSORBIDE MONONITRATE (IMDUR) 30 MG EXTENDED RELEASE TABLET    Take 1 tablet by mouth daily    MAGNESIUM OXIDE (MAGNESIUM-OXIDE) 250 MG TABS TABLET    Take 250 mg by mouth in the morning and at bedtime    MEMANTINE (NAMENDA) 5 MG TABLET    Take 5 mg by mouth in the morning and at bedtime    POTASSIUM CHLORIDE (KLOR-CON M) 10 MEQ EXTENDED RELEASE TABLET    Take 1 tablet by mouth daily    SITAGLIPTIN (JANUVIA) 100 MG TABLET    Take 25 mg by mouth daily Take half a tablet daily       ALLERGIES     Tylenol [acetaminophen]    FAMILY HISTORY     History reviewed. No pertinent family history. SOCIAL HISTORY       Social History     Socioeconomic History    Marital status:      Spouse name: None    Number of children: None    Years of education: None    Highest education level: None   Tobacco Use    Smoking status: Former    Smokeless tobacco: Never   Vaping Use    Vaping Use: Never used   Substance and Sexual Activity    Alcohol use: Not Currently    Drug use: Never    Sexual activity: Not Currently       SCREENINGS    Trish Coma Scale  Eye Opening: Spontaneous  Best Verbal Response: Oriented  Best Motor Response: Obeys commands  [place] Coma Scale Score: 15 @FLOW([id number])@      PHYSICAL EXAM    (up to 7 for level 4, 8 or more for level 5)     ED Triage Vitals [08/29/22 0903]   BP Temp Temp Source Heart Rate Resp SpO2 Height Weight   (!) 161/50 97.6 °F (36.4 °C) Oral 55 16 99 % 5' 3\" (1.6 m) 110 lb (49.9 kg)       Physical Exam  Vitals and nursing note reviewed. Constitutional:       General: She is not in acute distress. Appearance: She is well-developed. She is not ill-appearing, toxic-appearing or diaphoretic. HENT:      Head: Normocephalic and atraumatic. Comments: No visible signs of injuries, no cut scrapes abrasions, no pain to head face or scalp. Right Ear: Tympanic membrane normal.      Left Ear: Tympanic membrane normal.      Nose: Nose normal. No congestion. Mouth/Throat:      Mouth: Mucous membranes are moist.      Pharynx: No oropharyngeal exudate or posterior oropharyngeal erythema. Eyes:      Extraocular Movements: Extraocular movements intact. Conjunctiva/sclera: Conjunctivae normal.      Pupils: Pupils are equal, round, and reactive to light. Neck:      Vascular: No JVD. Trachea: No tracheal deviation.       Comments: Cervical spine no midline tenderness, no step-offs, no crepitus, no deformity. Full range of motion with no pain  Cardiovascular:      Rate and Rhythm: Normal rate. Pulses: Normal pulses. Heart sounds: Normal heart sounds. No murmur heard. No friction rub. No gallop. Pulmonary:      Effort: Pulmonary effort is normal. No tachypnea, accessory muscle usage, respiratory distress or retractions. Breath sounds: Normal breath sounds. No stridor. No wheezing, rhonchi or rales. Comments: Lung sounds are clear in all fields, no wheezes rales or rhonchi, no accessory muscle use, retractions, room air saturations are 99%  Chest:      Chest wall: No tenderness. Abdominal:      General: Abdomen is flat. Bowel sounds are normal. There is no distension or abdominal bruit. Palpations: Abdomen is soft. There is no shifting dullness, fluid wave, hepatomegaly, splenomegaly, mass or pulsatile mass. Tenderness: There is no abdominal tenderness. There is no right CVA tenderness, left CVA tenderness, guarding or rebound. Negative signs include Kennedy's sign, Rovsing's sign and McBurney's sign. Comments: Abdomen soft nondistended nontender no guarding mass rebound, no CVA tenderness. Musculoskeletal:         General: No deformity. Cervical back: Normal range of motion and neck supple. No rigidity. Legs:       Comments: And has tenderness on palpation to left hip, she is holding leg drawn up, she holds the proximal femur, and states she does have pain there as well, as well as pain on palpation to left knee. There is no crepitus or instability, there is shortening, no rotation noted. Positive dorsalis and posterior tibialis pulses. Pelvis is stable there is no crepitus or instability, no pain to right hip or femur, knee, tib-fib foot or ankle. No pain on palpation to cervical spine, thoracic back, lumbar spine, no pain across shoulders, humerus, elbows wrist hand or fingers.   Patient is neurologically intact, sensation upper lower extremities. Skin:     General: Skin is warm and dry. Capillary Refill: Capillary refill takes less than 2 seconds. Coloration: Skin is not jaundiced. Neurological:      General: No focal deficit present. Mental Status: She is alert and oriented to person, place, and time. Mental status is at baseline. Cranial Nerves: No cranial nerve deficit. Sensory: No sensory deficit. Motor: No weakness. Coordination: Coordination normal.   Psychiatric:         Mood and Affect: Mood normal.       DIAGNOSTIC RESULTS     EKG: All EKG's are interpreted by the Emergency Department Physician who either signs or Co-signsthis chart in the absence of a cardiologist.    EKG shows accelerated junctional rhythm with AV dual paced rhythm 69 bpm  ms    RADIOLOGY:   Non-plain filmimages such as CT, Ultrasound and MRI are read by the radiologist. Plain radiographic images are visualized and preliminarily interpreted by the emergency physician with the below findings:    Xray of the hip shows no acute fracture or malalignment. X-ray of the left femur shows comminuted fracture to the distal femur. X-ray of the left knee shows comminuted fracture distal femur. Chest x-ray shows no acute pulmonary process    Interpretation per the Radiologist below, if available at the time ofthis note:    XR HIP LEFT MIN 4VW W PELVIS   Final Result   Degenerative bone changes. No evidence of acute osseous pathology. XR FEMUR LEFT (MIN 2 VIEWS)   Final Result   1. INTERNAL FIXATION OF PROXIMAL LEFT FEMUR AND OF PATELLA. 2. COMMINUTED SUPRACONDYLAR FRACTURE DISTAL LEFT FEMUR, AS DISCUSSED         XR KNEE LEFT (MIN 4 VIEWS)   Final Result   1. OSTEOPENIA.   2. INTERNAL FIXATION PATELLA AND FEMUR. 3. SEVERELY COMMINUTED SUPRACONDYLAR FRACTURE OF THE DISTAL LEFT FEMUR. EXTENSION TO THE ARTICULAR CORTEX OF EITHER FEMORAL CONDYLE IS DIFFICULT TO EXCLUDE. 4. NO CONVINCING FRACTURE OF THE PROXIMAL TIBIA. 5. CORRELATION WITH CT MAY BE HELPFUL TO MORE COMPLETELY DEFINE ANY FRACTURES. XR CHEST PORTABLE   Final Result   No evidence of acute cardiopulmonary disease. CT FEMUR LEFT WO CONTRAST    (Results Pending)         ED BEDSIDE ULTRASOUND:   Performed by ED Physician - none    LABS:  Labs Reviewed   CBC WITH AUTO DIFFERENTIAL   COMPREHENSIVE METABOLIC PANEL   PROTIME-INR   APTT       All other labs were within normal range or not returned as of this dictation. EMERGENCY DEPARTMENT COURSE and DIFFERENTIAL DIAGNOSIS/MDM:   Vitals:    Vitals:    08/29/22 0903   BP: (!) 161/50   Pulse: 55   Resp: 16   Temp: 97.6 °F (36.4 °C)   TempSrc: Oral   SpO2: 99%   Weight: 110 lb (49.9 kg)   Height: 5' 3\" (1.6 m)              MDM  Number of Diagnoses or Management Options  Other closed fracture of distal end of left femur, initial encounter [facility])  Diagnosis management comments: Presented to the ED with complaint of left side leg pain secondary to a fall at nursing home. Patient unable to stand or ambulate since the incident occurred, she is holding her leg in a somewhat contracted position. She does have pain on palpation to left hip, left femur, and left knee. X-rays of the hip shows no acute fracture, x-ray of the femur shows a distal fracture which is comminuted and enters into the distal femoral condyle. Patient's daughter is present with her in the room, she states that she had a previous fracture in the same area which was repaired by one of the [employer], she cannot recall which one. She is requesting Center for orthopedics for there is appears well. Patient currently has a standing DNR [facility] arrest paperwork, daughter states she continues to want this on her. Patient is comfortable at the time my reevaluation, will be placed in a posterior splint, admitted to trauma services, with consults to orthopedics for repair.         CRITICAL CARE TIME   Total Critical Care time was    minutes, excluding separately reportableprocedures. There was a high probability of clinicallysignificant/life threatening deterioration in the patient's condition which required my urgent intervention. CONSULTS:  IP CONSULT TO ORTHOPEDIC SURGERY    PROCEDURES:  Unless otherwise noted below, none     Procedures    FINAL IMPRESSION      1. Other closed fracture of distal end of left femur, initial encounter [facility])          DISPOSITION/PLAN   DISPOSITION Decision To Admit 08/29/2022 10:28:04 AM      PATIENT REFERRED TO:  No follow-up provider specified.     DISCHARGE MEDICATIONS:  New Prescriptions    No medications on file          (Please note that portions of this note were completed with a voice recognition program.  Efforts were made to edit the dictations but occasionally words are mis-transcribed.)    Eva Schwab PA-C (electronically signed)  Attending Emergency Physician         Eva Schwab PA-C  08/30/22 4012

## 2022-08-29 NOTE — ED TRIAGE NOTES
Pt to the ED via EMS from [facility] with left hip pain after fall at home. Pt is alert but very [place]. Pt unable to straighten left leg and grabbing at left hip. Pt fell in SNF by tripping over a piece of furniture.      Pt received 50 mcg of Fentanyl in route to hospiatl

## 2022-08-29 NOTE — ED NOTES
Report called to [facility]. All questions and concerns addressed and answered. VSS and respirations e/u on RA. Pt denies any additional needs at this time.        Patti German RN  08/29/22 8152

## 2022-08-29 NOTE — CARE COORDINATION
[facility] EMERGENCY [facility] Case Management Initial Discharge Assessment    Met with Patient to discuss discharge plan. PCP: Britt Perales MD                                  Date of Last Visit: 5/24/22    [facility] Patient: No        VA Notified: no    If no PCP, list provided? N/A    Discharge Planning    Living Arrangements: in assisted living facility dependent on nursing care    Who do you live with? [id number]    Who helps you with your care:  staff     If lives at home:     Do you have any barriers navigating in your home? no    Patient can perform ADL? Yes, requires some help    Current Services (outpatient and in home) :  From SNF [facility])    Dialysis: No    Is transportation available to get to your appointments? Yes, staff     DME Equipment:  yes - Cane     Respiratory equipment: None    Respiratory provider:  no     Pharmacy:  yes - per facility     Consult with Medication Assistance Program?  No      Patient agreeable to [facility]? Yes, Company TBD    Patient agreeable to SNF/Rehab? Yes, [facility]     Other discharge needs identified? N/A    Does Patient Have a High-Risk for Readmission Diagnosis (CHF, PN, MI, COPD)? Yes, see care coordinator assessment    If Yes,    Consult with pulmonologist? No  Consult with cardiologist? No  Cardiac Rehab referral if EF <35%? No  Consult with Pharmacy for medication assessment prior to discharge? No  Consult with Behavioral health to aid in depression, anxiety, or coping issues? No  Palliative Care Consult? No  Pulmonary Rehab order for COPD, PN, and CHF (if EF > 35%)? No   Does patient have a reliable scale and know how to read it (for CHF)? Yes  Nutrition consult for CHF? No  Respiratory therapy consult that includes bedside instruction on administration of nebulizers and/or inhalers, and assessment of oxygen and equipment needs in the home? No    Initial Discharge Plan?  (Note: please see concurrent daily documentation for any updates after initial note). Met with pt at bedside to discuss discharge plans. Pt plans to return to [facility] upon discharge. Pt will likely require skilled therapy prior to AL. FOC given and pt would like to go to [facility].      Readmission Risk              Risk of Unplanned Readmission:  0         Electronically signed by Valerie Price RN on 8/29/2022 at 10:40 AM

## 2022-08-30 ENCOUNTER — ANESTHESIA (OUTPATIENT)
Dept: OPERATING ROOM | Age: 87
DRG: 480 | End: 2022-08-30
Payer: MEDICARE

## 2022-08-30 ENCOUNTER — ANESTHESIA EVENT (OUTPATIENT)
Dept: OPERATING ROOM | Age: 87
DRG: 480 | End: 2022-08-30
Payer: MEDICARE

## 2022-08-30 ENCOUNTER — APPOINTMENT (OUTPATIENT)
Dept: GENERAL RADIOLOGY | Age: 87
DRG: 480 | End: 2022-08-30
Payer: MEDICARE

## 2022-08-30 LAB
ABO/RH: NORMAL
ANION GAP SERPL CALCULATED.3IONS-SCNC: 11 MEQ/L (ref 9–15)
ANTIBODY SCREEN: NORMAL
BUN BLDV-MCNC: 36 MG/DL (ref 8–23)
CALCIUM SERPL-MCNC: 9.3 MG/DL (ref 8.5–9.9)
CHLORIDE BLD-SCNC: 101 MEQ/L (ref 95–107)
CO2: 24 MEQ/L (ref 20–31)
CREAT SERPL-MCNC: 1.99 MG/DL (ref 0.5–0.9)
EKG ATRIAL RATE: 69 BPM
EKG Q-T INTERVAL: 452 MS
EKG QRS DURATION: 106 MS
EKG QTC CALCULATION (BAZETT): 484 MS
EKG R AXIS: 188 DEGREES
EKG T AXIS: -69 DEGREES
EKG VENTRICULAR RATE: 69 BPM
ETHANOL PERCENT: 0.01 G/DL
ETHANOL: 11 MG/DL (ref 0–0.08)
GFR AFRICAN AMERICAN: 28.2
GFR SERPL CREATININE-BSD FRML MDRD: 22.8 ML/MIN/{1.73_M2}
GLUCOSE BLD-MCNC: 109 MG/DL (ref 70–99)
GLUCOSE BLD-MCNC: 129 MG/DL (ref 70–99)
GLUCOSE BLD-MCNC: 191 MG/DL (ref 70–99)
GLUCOSE BLD-MCNC: 260 MG/DL (ref 70–99)
GLUCOSE BLD-MCNC: 77 MG/DL (ref 70–99)
GLUCOSE BLD-MCNC: 96 MG/DL (ref 70–99)
HCT VFR BLD CALC: 32.2 % (ref 37–47)
HEMOGLOBIN: 10.6 G/DL (ref 12–16)
MAGNESIUM: 2.5 MG/DL (ref 1.7–2.4)
MCH RBC QN AUTO: 29 PG (ref 27–31.3)
MCHC RBC AUTO-ENTMCNC: 33 % (ref 33–37)
MCV RBC AUTO: 87.8 FL (ref 82–100)
PDW BLD-RTO: 14.6 % (ref 11.5–14.5)
PERFORMED ON: ABNORMAL
PERFORMED ON: NORMAL
PLATELET # BLD: 188 K/UL (ref 130–400)
POTASSIUM SERPL-SCNC: 4.9 MEQ/L (ref 3.4–4.9)
RBC # BLD: 3.67 M/UL (ref 4.2–5.4)
SODIUM BLD-SCNC: 136 MEQ/L (ref 135–144)
WBC # BLD: 8.8 K/UL (ref 4.8–10.8)

## 2022-08-30 PROCEDURE — 82077 ASSAY SPEC XCP UR&BREATH IA: CPT

## 2022-08-30 PROCEDURE — 3700000001 HC ADD 15 MINUTES (ANESTHESIA): Performed by: ORTHOPAEDIC SURGERY

## 2022-08-30 PROCEDURE — 2580000003 HC RX 258: Performed by: ORTHOPAEDIC SURGERY

## 2022-08-30 PROCEDURE — 86850 RBC ANTIBODY SCREEN: CPT

## 2022-08-30 PROCEDURE — 93010 ELECTROCARDIOGRAM REPORT: CPT | Performed by: INTERNAL MEDICINE

## 2022-08-30 PROCEDURE — 85027 COMPLETE CBC AUTOMATED: CPT

## 2022-08-30 PROCEDURE — 1210000000 HC MED SURG R&B

## 2022-08-30 PROCEDURE — 64447 NJX AA&/STRD FEMORAL NRV IMG: CPT | Performed by: ANESTHESIOLOGY

## 2022-08-30 PROCEDURE — 3700000000 HC ANESTHESIA ATTENDED CARE: Performed by: ORTHOPAEDIC SURGERY

## 2022-08-30 PROCEDURE — 3600000004 HC SURGERY LEVEL 4 BASE: Performed by: ORTHOPAEDIC SURGERY

## 2022-08-30 PROCEDURE — 0QSC04Z REPOSITION LEFT LOWER FEMUR WITH INTERNAL FIXATION DEVICE, OPEN APPROACH: ICD-10-PCS | Performed by: ORTHOPAEDIC SURGERY

## 2022-08-30 PROCEDURE — 6370000000 HC RX 637 (ALT 250 FOR IP): Performed by: STUDENT IN AN ORGANIZED HEALTH CARE EDUCATION/TRAINING PROGRAM

## 2022-08-30 PROCEDURE — 3209999900 FLUORO FOR SURGICAL PROCEDURES

## 2022-08-30 PROCEDURE — 2580000003 HC RX 258: Performed by: ANESTHESIOLOGY

## 2022-08-30 PROCEDURE — 6370000000 HC RX 637 (ALT 250 FOR IP): Performed by: PHYSICIAN ASSISTANT

## 2022-08-30 PROCEDURE — 6360000002 HC RX W HCPCS: Performed by: ORTHOPAEDIC SURGERY

## 2022-08-30 PROCEDURE — 2709999900 HC NON-CHARGEABLE SUPPLY: Performed by: ORTHOPAEDIC SURGERY

## 2022-08-30 PROCEDURE — 6360000002 HC RX W HCPCS: Performed by: ANESTHESIOLOGY

## 2022-08-30 PROCEDURE — 7100000000 HC PACU RECOVERY - FIRST 15 MIN: Performed by: ORTHOPAEDIC SURGERY

## 2022-08-30 PROCEDURE — 2580000003 HC RX 258: Performed by: STUDENT IN AN ORGANIZED HEALTH CARE EDUCATION/TRAINING PROGRAM

## 2022-08-30 PROCEDURE — 2500000003 HC RX 250 WO HCPCS: Performed by: ANESTHESIOLOGY

## 2022-08-30 PROCEDURE — 3600000014 HC SURGERY LEVEL 4 ADDTL 15MIN: Performed by: ORTHOPAEDIC SURGERY

## 2022-08-30 PROCEDURE — 83735 ASSAY OF MAGNESIUM: CPT

## 2022-08-30 PROCEDURE — C1713 ANCHOR/SCREW BN/BN,TIS/BN: HCPCS | Performed by: ORTHOPAEDIC SURGERY

## 2022-08-30 PROCEDURE — 86900 BLOOD TYPING SEROLOGIC ABO: CPT

## 2022-08-30 PROCEDURE — P9016 RBC LEUKOCYTES REDUCED: HCPCS

## 2022-08-30 PROCEDURE — 99231 SBSQ HOSP IP/OBS SF/LOW 25: CPT | Performed by: PHYSICIAN ASSISTANT

## 2022-08-30 PROCEDURE — C1769 GUIDE WIRE: HCPCS | Performed by: ORTHOPAEDIC SURGERY

## 2022-08-30 PROCEDURE — 80048 BASIC METABOLIC PNL TOTAL CA: CPT

## 2022-08-30 PROCEDURE — 85025 COMPLETE CBC W/AUTO DIFF WBC: CPT

## 2022-08-30 PROCEDURE — 86923 COMPATIBILITY TEST ELECTRIC: CPT

## 2022-08-30 PROCEDURE — 36415 COLL VENOUS BLD VENIPUNCTURE: CPT

## 2022-08-30 PROCEDURE — A4217 STERILE WATER/SALINE, 500 ML: HCPCS | Performed by: ORTHOPAEDIC SURGERY

## 2022-08-30 PROCEDURE — 7100000001 HC PACU RECOVERY - ADDTL 15 MIN: Performed by: ORTHOPAEDIC SURGERY

## 2022-08-30 PROCEDURE — 2720000010 HC SURG SUPPLY STERILE: Performed by: ORTHOPAEDIC SURGERY

## 2022-08-30 PROCEDURE — 86901 BLOOD TYPING SEROLOGIC RH(D): CPT

## 2022-08-30 DEVICE — SCREW BNE L45MM DIA3.5MM CORT S STL ST NONCANNULATED LOK: Type: IMPLANTABLE DEVICE | Site: FEMUR | Status: FUNCTIONAL

## 2022-08-30 DEVICE — PLATE BNE L266MM 12 H L CNDYL S STL VAR ANG LOK COMPR CRV: Type: IMPLANTABLE DEVICE | Site: FEMUR | Status: FUNCTIONAL

## 2022-08-30 DEVICE — IMPLANTABLE DEVICE: Type: IMPLANTABLE DEVICE | Site: FEMUR | Status: FUNCTIONAL

## 2022-08-30 DEVICE — SCREW BNE L36MM DIA5MM CNDYL S STL ST VAR ANG LOK COMPR T25: Type: IMPLANTABLE DEVICE | Site: FEMUR | Status: FUNCTIONAL

## 2022-08-30 DEVICE — SCREW BNE L34MM DIA5MM CNDYL S STL ST VAR ANG LOK FULL THRD: Type: IMPLANTABLE DEVICE | Site: FEMUR | Status: FUNCTIONAL

## 2022-08-30 DEVICE — SCREW BNE L42MM DIA5MM CNDYL S STL ST VAR ANG LOK T25: Type: IMPLANTABLE DEVICE | Site: FEMUR | Status: FUNCTIONAL

## 2022-08-30 DEVICE — SCREW BNE L34MM DIA4.5MM PROX CORT TIB S STL ST LOK FULL: Type: IMPLANTABLE DEVICE | Site: FEMUR | Status: FUNCTIONAL

## 2022-08-30 DEVICE — SCREW BNE L12MM DIA5MM CNDYL S STL ST VAR ANG LOK COMPR T25: Type: IMPLANTABLE DEVICE | Site: FEMUR | Status: FUNCTIONAL

## 2022-08-30 DEVICE — SCREW BNE L38MM DIA5MM CNDYL S STL ST VAR ANG LOK T25: Type: IMPLANTABLE DEVICE | Site: FEMUR | Status: FUNCTIONAL

## 2022-08-30 RX ORDER — BUPIVACAINE HYDROCHLORIDE 2.5 MG/ML
INJECTION, SOLUTION EPIDURAL; INFILTRATION; INTRACAUDAL PRN
Status: DISCONTINUED | OUTPATIENT
Start: 2022-08-30 | End: 2022-08-30 | Stop reason: SDUPTHER

## 2022-08-30 RX ORDER — HYDRALAZINE HYDROCHLORIDE 20 MG/ML
10 INJECTION INTRAMUSCULAR; INTRAVENOUS
Status: DISCONTINUED | OUTPATIENT
Start: 2022-08-30 | End: 2022-08-30 | Stop reason: HOSPADM

## 2022-08-30 RX ORDER — OXYCODONE HYDROCHLORIDE 5 MG/1
5 TABLET ORAL PRN
Status: DISCONTINUED | OUTPATIENT
Start: 2022-08-30 | End: 2022-08-30 | Stop reason: HOSPADM

## 2022-08-30 RX ORDER — ONDANSETRON 2 MG/ML
4 INJECTION INTRAMUSCULAR; INTRAVENOUS
Status: DISCONTINUED | OUTPATIENT
Start: 2022-08-30 | End: 2022-08-30 | Stop reason: HOSPADM

## 2022-08-30 RX ORDER — SODIUM CHLORIDE 9 MG/ML
INJECTION, SOLUTION INTRAVENOUS PRN
Status: DISCONTINUED | OUTPATIENT
Start: 2022-08-30 | End: 2022-09-02

## 2022-08-30 RX ORDER — OXYCODONE HYDROCHLORIDE 5 MG/1
10 TABLET ORAL EVERY 4 HOURS PRN
Status: DISCONTINUED | OUTPATIENT
Start: 2022-08-30 | End: 2022-09-02

## 2022-08-30 RX ORDER — SODIUM CHLORIDE, SODIUM LACTATE, POTASSIUM CHLORIDE, CALCIUM CHLORIDE 600; 310; 30; 20 MG/100ML; MG/100ML; MG/100ML; MG/100ML
INJECTION, SOLUTION INTRAVENOUS CONTINUOUS PRN
Status: DISCONTINUED | OUTPATIENT
Start: 2022-08-30 | End: 2022-08-30 | Stop reason: SDUPTHER

## 2022-08-30 RX ORDER — OXYCODONE HYDROCHLORIDE 5 MG/1
5 TABLET ORAL EVERY 4 HOURS PRN
Status: DISCONTINUED | OUTPATIENT
Start: 2022-08-30 | End: 2022-09-02

## 2022-08-30 RX ORDER — OXYCODONE HYDROCHLORIDE 5 MG/1
5 TABLET ORAL EVERY 4 HOURS PRN
Status: DISCONTINUED | OUTPATIENT
Start: 2022-08-30 | End: 2022-08-31

## 2022-08-30 RX ORDER — SODIUM CHLORIDE 9 MG/ML
25 INJECTION, SOLUTION INTRAVENOUS PRN
Status: DISCONTINUED | OUTPATIENT
Start: 2022-08-30 | End: 2022-08-30 | Stop reason: HOSPADM

## 2022-08-30 RX ORDER — SODIUM CHLORIDE 0.9 % (FLUSH) 0.9 %
5-40 SYRINGE (ML) INJECTION PRN
Status: DISCONTINUED | OUTPATIENT
Start: 2022-08-30 | End: 2022-09-02

## 2022-08-30 RX ORDER — LIDOCAINE HYDROCHLORIDE 20 MG/ML
INJECTION, SOLUTION INFILTRATION; PERINEURAL PRN
Status: DISCONTINUED | OUTPATIENT
Start: 2022-08-30 | End: 2022-08-30

## 2022-08-30 RX ORDER — LIDOCAINE HYDROCHLORIDE 20 MG/ML
INJECTION, SOLUTION INTRAVENOUS PRN
Status: DISCONTINUED | OUTPATIENT
Start: 2022-08-30 | End: 2022-08-30 | Stop reason: SDUPTHER

## 2022-08-30 RX ORDER — DEXAMETHASONE SODIUM PHOSPHATE 4 MG/ML
INJECTION, SOLUTION INTRA-ARTICULAR; INTRALESIONAL; INTRAMUSCULAR; INTRAVENOUS; SOFT TISSUE PRN
Status: DISCONTINUED | OUTPATIENT
Start: 2022-08-30 | End: 2022-08-30 | Stop reason: SDUPTHER

## 2022-08-30 RX ORDER — MORPHINE SULFATE 2 MG/ML
2 INJECTION, SOLUTION INTRAMUSCULAR; INTRAVENOUS
Status: DISCONTINUED | OUTPATIENT
Start: 2022-08-30 | End: 2022-08-31

## 2022-08-30 RX ORDER — SODIUM CHLORIDE, SODIUM LACTATE, POTASSIUM CHLORIDE, CALCIUM CHLORIDE 600; 310; 30; 20 MG/100ML; MG/100ML; MG/100ML; MG/100ML
INJECTION, SOLUTION INTRAVENOUS CONTINUOUS
Status: DISCONTINUED | OUTPATIENT
Start: 2022-08-30 | End: 2022-08-31

## 2022-08-30 RX ORDER — PROPOFOL 10 MG/ML
INJECTION, EMULSION INTRAVENOUS PRN
Status: DISCONTINUED | OUTPATIENT
Start: 2022-08-30 | End: 2022-08-30 | Stop reason: SDUPTHER

## 2022-08-30 RX ORDER — SODIUM CHLORIDE 0.9 % (FLUSH) 0.9 %
5-40 SYRINGE (ML) INJECTION EVERY 12 HOURS SCHEDULED
Status: DISCONTINUED | OUTPATIENT
Start: 2022-08-30 | End: 2022-08-30 | Stop reason: HOSPADM

## 2022-08-30 RX ORDER — ROCURONIUM BROMIDE 10 MG/ML
INJECTION, SOLUTION INTRAVENOUS PRN
Status: DISCONTINUED | OUTPATIENT
Start: 2022-08-30 | End: 2022-08-30 | Stop reason: SDUPTHER

## 2022-08-30 RX ORDER — PROCHLORPERAZINE EDISYLATE 5 MG/ML
5 INJECTION INTRAMUSCULAR; INTRAVENOUS
Status: DISCONTINUED | OUTPATIENT
Start: 2022-08-30 | End: 2022-08-30 | Stop reason: HOSPADM

## 2022-08-30 RX ORDER — ONDANSETRON 2 MG/ML
4 INJECTION INTRAMUSCULAR; INTRAVENOUS EVERY 6 HOURS PRN
Status: DISCONTINUED | OUTPATIENT
Start: 2022-08-30 | End: 2022-09-04 | Stop reason: HOSPADM

## 2022-08-30 RX ORDER — MAGNESIUM HYDROXIDE/ALUMINUM HYDROXICE/SIMETHICONE 120; 1200; 1200 MG/30ML; MG/30ML; MG/30ML
15 SUSPENSION ORAL EVERY 6 HOURS PRN
Status: DISCONTINUED | OUTPATIENT
Start: 2022-08-30 | End: 2022-09-04 | Stop reason: HOSPADM

## 2022-08-30 RX ORDER — OXYCODONE HYDROCHLORIDE 5 MG/1
10 TABLET ORAL PRN
Status: DISCONTINUED | OUTPATIENT
Start: 2022-08-30 | End: 2022-08-30 | Stop reason: HOSPADM

## 2022-08-30 RX ORDER — ONDANSETRON 2 MG/ML
INJECTION INTRAMUSCULAR; INTRAVENOUS PRN
Status: DISCONTINUED | OUTPATIENT
Start: 2022-08-30 | End: 2022-08-30 | Stop reason: SDUPTHER

## 2022-08-30 RX ORDER — SODIUM CHLORIDE 0.9 % (FLUSH) 0.9 %
5-40 SYRINGE (ML) INJECTION PRN
Status: DISCONTINUED | OUTPATIENT
Start: 2022-08-30 | End: 2022-08-30 | Stop reason: HOSPADM

## 2022-08-30 RX ORDER — ONDANSETRON 4 MG/1
4 TABLET, ORALLY DISINTEGRATING ORAL EVERY 8 HOURS PRN
Status: DISCONTINUED | OUTPATIENT
Start: 2022-08-30 | End: 2022-09-04 | Stop reason: HOSPADM

## 2022-08-30 RX ORDER — ENOXAPARIN SODIUM 100 MG/ML
30 INJECTION SUBCUTANEOUS 2 TIMES DAILY
Status: DISCONTINUED | OUTPATIENT
Start: 2022-08-31 | End: 2022-08-31 | Stop reason: ALTCHOICE

## 2022-08-30 RX ORDER — LABETALOL HYDROCHLORIDE 5 MG/ML
10 INJECTION, SOLUTION INTRAVENOUS
Status: DISCONTINUED | OUTPATIENT
Start: 2022-08-30 | End: 2022-08-30 | Stop reason: HOSPADM

## 2022-08-30 RX ORDER — MAGNESIUM HYDROXIDE 1200 MG/15ML
LIQUID ORAL CONTINUOUS PRN
Status: COMPLETED | OUTPATIENT
Start: 2022-08-30 | End: 2022-08-30

## 2022-08-30 RX ORDER — ALBUMIN, HUMAN INJ 5% 5 %
12.5 SOLUTION INTRAVENOUS ONCE
Status: COMPLETED | OUTPATIENT
Start: 2022-08-31 | End: 2022-08-31

## 2022-08-30 RX ORDER — SODIUM CHLORIDE 0.9 % (FLUSH) 0.9 %
5-40 SYRINGE (ML) INJECTION EVERY 12 HOURS SCHEDULED
Status: DISCONTINUED | OUTPATIENT
Start: 2022-08-30 | End: 2022-09-02

## 2022-08-30 RX ORDER — DIPHENHYDRAMINE HYDROCHLORIDE 50 MG/ML
12.5 INJECTION INTRAMUSCULAR; INTRAVENOUS
Status: DISCONTINUED | OUTPATIENT
Start: 2022-08-30 | End: 2022-08-30 | Stop reason: HOSPADM

## 2022-08-30 RX ORDER — ACETAMINOPHEN 325 MG/1
650 TABLET ORAL EVERY 4 HOURS PRN
Status: DISCONTINUED | OUTPATIENT
Start: 2022-08-30 | End: 2022-09-04 | Stop reason: HOSPADM

## 2022-08-30 RX ORDER — FENTANYL CITRATE 50 UG/ML
25 INJECTION, SOLUTION INTRAMUSCULAR; INTRAVENOUS EVERY 5 MIN PRN
Status: DISCONTINUED | OUTPATIENT
Start: 2022-08-30 | End: 2022-08-30 | Stop reason: HOSPADM

## 2022-08-30 RX ORDER — FENTANYL CITRATE 50 UG/ML
50 INJECTION, SOLUTION INTRAMUSCULAR; INTRAVENOUS EVERY 5 MIN PRN
Status: DISCONTINUED | OUTPATIENT
Start: 2022-08-30 | End: 2022-08-30 | Stop reason: HOSPADM

## 2022-08-30 RX ADMIN — DEXAMETHASONE SODIUM PHOSPHATE 4 MG: 4 INJECTION, SOLUTION INTRAMUSCULAR; INTRAVENOUS at 16:31

## 2022-08-30 RX ADMIN — CEFAZOLIN 1000 MG: 1 INJECTION, POWDER, FOR SOLUTION INTRAMUSCULAR; INTRAVENOUS at 21:17

## 2022-08-30 RX ADMIN — METHOCARBAMOL 500 MG: 500 TABLET ORAL at 21:04

## 2022-08-30 RX ADMIN — PROPOFOL 50 MG: 10 INJECTION, EMULSION INTRAVENOUS at 16:02

## 2022-08-30 RX ADMIN — MEMANTINE 5 MG: 5 TABLET ORAL at 21:02

## 2022-08-30 RX ADMIN — SODIUM CHLORIDE, POTASSIUM CHLORIDE, SODIUM LACTATE AND CALCIUM CHLORIDE: 600; 310; 30; 20 INJECTION, SOLUTION INTRAVENOUS at 16:50

## 2022-08-30 RX ADMIN — ROCURONIUM BROMIDE 30 MG: 10 INJECTION INTRAVENOUS at 16:02

## 2022-08-30 RX ADMIN — DONEPEZIL HYDROCHLORIDE 5 MG: 5 TABLET, FILM COATED ORAL at 21:02

## 2022-08-30 RX ADMIN — SODIUM CHLORIDE, POTASSIUM CHLORIDE, SODIUM LACTATE AND CALCIUM CHLORIDE: 600; 310; 30; 20 INJECTION, SOLUTION INTRAVENOUS at 21:10

## 2022-08-30 RX ADMIN — SODIUM CHLORIDE, POTASSIUM CHLORIDE, SODIUM LACTATE AND CALCIUM CHLORIDE: 600; 310; 30; 20 INJECTION, SOLUTION INTRAVENOUS at 09:07

## 2022-08-30 RX ADMIN — PHENYLEPHRINE HYDROCHLORIDE 100 MCG: 10 INJECTION INTRAVENOUS at 17:06

## 2022-08-30 RX ADMIN — SUGAMMADEX 200 MG: 100 INJECTION, SOLUTION INTRAVENOUS at 17:28

## 2022-08-30 RX ADMIN — SENNOSIDES AND DOCUSATE SODIUM 1 TABLET: 50; 8.6 TABLET ORAL at 21:02

## 2022-08-30 RX ADMIN — PHENYLEPHRINE HYDROCHLORIDE 100 MCG: 10 INJECTION INTRAVENOUS at 16:50

## 2022-08-30 RX ADMIN — Medication 5 MG: at 21:02

## 2022-08-30 RX ADMIN — OXYCODONE 5 MG: 5 TABLET ORAL at 00:13

## 2022-08-30 RX ADMIN — MIRTAZAPINE 7.5 MG: 15 TABLET, FILM COATED ORAL at 21:02

## 2022-08-30 RX ADMIN — LIDOCAINE HYDROCHLORIDE 40 MG: 20 INJECTION, SOLUTION INTRAVENOUS at 16:02

## 2022-08-30 RX ADMIN — Medication 400 MG: at 21:02

## 2022-08-30 RX ADMIN — SODIUM CHLORIDE, POTASSIUM CHLORIDE, SODIUM LACTATE AND CALCIUM CHLORIDE: 600; 310; 30; 20 INJECTION, SOLUTION INTRAVENOUS at 15:58

## 2022-08-30 RX ADMIN — OXYCODONE 5 MG: 5 TABLET ORAL at 09:20

## 2022-08-30 RX ADMIN — PHENYLEPHRINE HYDROCHLORIDE 100 MCG: 10 INJECTION INTRAVENOUS at 17:05

## 2022-08-30 RX ADMIN — Medication 10 ML: at 21:02

## 2022-08-30 RX ADMIN — CARVEDILOL 6.25 MG: 3.12 TABLET, FILM COATED ORAL at 21:02

## 2022-08-30 RX ADMIN — BUPIVACAINE HYDROCHLORIDE 20 ML: 2.5 INJECTION, SOLUTION EPIDURAL; INFILTRATION; INTRACAUDAL; PERINEURAL at 15:48

## 2022-08-30 RX ADMIN — PHENYLEPHRINE HYDROCHLORIDE 100 MCG: 10 INJECTION INTRAVENOUS at 16:49

## 2022-08-30 RX ADMIN — SODIUM CHLORIDE, POTASSIUM CHLORIDE, SODIUM LACTATE AND CALCIUM CHLORIDE: 600; 310; 30; 20 INJECTION, SOLUTION INTRAVENOUS at 19:46

## 2022-08-30 RX ADMIN — PHENYLEPHRINE HYDROCHLORIDE 100 MCG: 10 INJECTION INTRAVENOUS at 17:10

## 2022-08-30 RX ADMIN — OXYCODONE 5 MG: 5 TABLET ORAL at 13:57

## 2022-08-30 RX ADMIN — INSULIN GLARGINE 6 UNITS: 100 INJECTION, SOLUTION SUBCUTANEOUS at 21:05

## 2022-08-30 RX ADMIN — PHENYLEPHRINE HYDROCHLORIDE 100 MCG: 10 INJECTION INTRAVENOUS at 16:41

## 2022-08-30 RX ADMIN — ONDANSETRON 4 MG: 2 INJECTION INTRAMUSCULAR; INTRAVENOUS at 17:18

## 2022-08-30 ASSESSMENT — PAIN DESCRIPTION - LOCATION
LOCATION: LEG
LOCATION: OTHER (COMMENT)

## 2022-08-30 ASSESSMENT — PAIN SCALES - WONG BAKER
WONGBAKER_NUMERICALRESPONSE: 4
WONGBAKER_NUMERICALRESPONSE: 10

## 2022-08-30 ASSESSMENT — PAIN DESCRIPTION - ORIENTATION
ORIENTATION: LEFT

## 2022-08-30 ASSESSMENT — PAIN DESCRIPTION - DESCRIPTORS
DESCRIPTORS: SORE
DESCRIPTORS: SHOOTING
DESCRIPTORS: SORE

## 2022-08-30 ASSESSMENT — PAIN SCALES - GENERAL
PAINLEVEL_OUTOF10: 0
PAINLEVEL_OUTOF10: 3
PAINLEVEL_OUTOF10: 5
PAINLEVEL_OUTOF10: 9
PAINLEVEL_OUTOF10: 6
PAINLEVEL_OUTOF10: 10

## 2022-08-30 ASSESSMENT — PAIN DESCRIPTION - PAIN TYPE
TYPE: ACUTE PAIN
TYPE: ACUTE PAIN

## 2022-08-30 ASSESSMENT — PAIN - FUNCTIONAL ASSESSMENT: PAIN_FUNCTIONAL_ASSESSMENT: ACTIVITIES ARE NOT PREVENTED

## 2022-08-30 NOTE — PROGRESS NOTES
Patient ID:  Rocío Guerrero  [id number]  59 y.o.  5/4/1928  BOVIE PAD SITE CLEAR AND INTACT PRE AND POST OP. TAKEN TO PACU,   ATTACHED TO MONITOR AND REPORT GIVEN TO RN.   VSS DRSG DRY AND INTACT        Electronically signed by Alyson Ziadi RN on 8/30/2022

## 2022-08-30 NOTE — PROGRESS NOTES
-Pt glucose was 478. Administrated scheduled insulin  -Recheck pt glucose 407  -Notified [employer] team, 10 units of lispro ordered.   Will cont to monitor pt

## 2022-08-30 NOTE — DISCHARGE INSTR - COC
Continuity of Care Form    Patient Name: Floridalma Seals   :  1928  MRN:  [id number]    [address] [place] [address] date:  2022  Discharge date:  9/3/22    Code Status Order: DNR-CCA   Advance Directives:     Admitting Physician:  Jamil Hayes MD  PCP: Yony Johnson MD    Discharging Nurse: [facility] Unit/Room#: -33  Discharging Unit Phone Number: [phone]    Emergency Contact:   Extended Emergency Contact Information  Primary Emergency Contact: [address] of [address] [place] [address] Phone: [phone]  Mobile Phone: [phone]  Relation: Child    Past Surgical History:  Past Surgical History:   Procedure Laterality Date    APPENDECTOMY      CORONARY ANGIOPLASTY WITH STENT PLACEMENT      FOREARM SURGERY Right 2021    CLOSED  REDUCTION INTERNAL FIXATION RIGHT  DISTAL RADIUS PINNING performed by Meera Flores MD at [address] Left 2012    PACEMAKER INSERTION      TONSILLECTOMY      WRIST FRACTURE SURGERY Left 2012       Immunization History:   Immunization History   Administered Date(s) Administered    Pneumococcal Conjugate 13-valent Morenita Carey) 2016    Tdap (Boostrix, Adacel) 2018    Zoster Live (Zostavax) 03/15/2016       Active Problems:  Patient Active Problem List   Diagnosis Code    Ischemic cardiomyopathy I25.5    CAD (coronary artery disease) I25.10    Hypertension I10    Pure hypercholesterolemia E78.00    Closed nondisp transvrs fracture of right patella with routine healing S82.034D    Diabetes mellitus ([facility] [address] E11.9    Pacemaker Z95.0    Cardiomyopathy ([facility] [address] I42.9    Closed fracture of right distal radius and ulna S52.501A, S52.601A    Closed nondisplaced transverse fracture of right patella with routine healing S82.034D    Type 2 diabetes mellitus with hyperosmolar hyperglycemic state (HHS) ([facility]) E11.00, E11.65    Hyperglycemia R73.9    Closed disp comminuted fracture of shaft of right femur with malunion S72.351P Isolation/Infection:   Isolation            Contact          Patient Infection Status       Infection Onset Added Last Indicated Last Indicated By Review Planned Expiration Resolved Resolved By    ESBL (Extended Spectrum Beta Lactamase) 21 Culture, Urine        MDRO (multi-drug resistant organism) 21 Culture, Urine        Resolved    COVID-19 (Rule Out) 21 COVID-19, Rapid (Ordered)   21 Rule-Out Test Resulted            Nurse Assessment:  Last Vital Signs: BP (!) 103/58   Pulse 88   Temp 98.6 °F (37 °C) (Oral)   Resp 16   Ht 5' 3\" (1.6 m)   Wt 110 lb (49.9 kg)   SpO2 94%   BMI 19.49 kg/m²     Last documented pain score (0-10 scale): Pain Level: 6  Last Weight:   Wt Readings from Last 1 Encounters:   22 110 lb (49.9 kg)     Mental Status:  {IP PT MENTAL STATUS:52599}is alert and will talk if she wants too. If don't want food will spit out    IV Access:      Nursing Mobility/ADLs:  Walking   {CHP DME HSWD:325504816}  Transfer  {CHP DME BUIX:233153490}  Bathing  {CHP DME IQE}  Dressing  {CHP DME CSZS:360745691}  Toileting  {CHP DME OGSZ:854560423}  Feeding  {CHP DME WCKV:830922395}  Med Admin  {CHP DME VNGZ:419785268}  Med Delivery   crushed and ***    Wound Care Documentation and Therapy:        Elimination:  Continence: Bowel: {YES / SA:91965}  Bladder: {YES / IL:32777}  Urinary Catheter: {Urinary Catheter:822831043}   Colostomy/Ileostomy/Ileal Conduit: {YES / AY:82967}       Date of Last BM: 9/3/22  No intake or output data in the 24 hours ending 22 1514  No intake/output data recorded. 240cc     Safety Concerns:     [address] Safety Concerns:760235950}    Impairments/Disabilities:      [address] Impairments/Disabilities:526103430}    Nutrition Therapy:  Current Nutrition Therapy:   [address] [employer] Diet List:420485062}    Routes of Feeding: {CHP DME Other Feedings:218571888}  Liquids: {Slp liquid thickness:99420}  Daily Fluid Restriction: {CHP DME Yes amt example:815823047}  Last Modified Barium Swallow with Video (Video Swallowing Test): {Done Not Done FFWR:301926677}    Treatments at the Time of Hospital Discharge:   Respiratory Treatments: ***  Oxygen Therapy:  {Therapy; copd oxygen:50923}  Ventilator:    {[facility] Vent KEUL:165298145}    Rehab Therapies: {THERAPEUTIC INTERVENTION:2697597964}  Weight Bearing Status/Restrictions: {[facility] Weight Bearin}  Other Medical Equipment (for information only, NOT a DME order):  {EQUIPMENT:727891735}  Other Treatments: ***    Patient's personal belongings (please select all that are sent with patient):  {CHP DME Belongings:744948711}    RN SIGNATURE:  {Esignature:784369392}    CASE MANAGEMENT/SOCIAL WORK SECTION    Inpatient Status Date: 22    Readmission Risk Assessment Score:  Readmission Risk              Risk of Unplanned Readmission:  18           Discharging to Facility/ Agency   Name: [facility]  Address:  Phone:  Fax:    Dialysis Facility (if applicable)   Name:  Address:  Dialysis Schedule:  Phone:  Fax:    / signature: Electronically signed by MABLE Mathew on 22 at 3:14 PM EDT    PHYSICIAN SECTION    Prognosis: Fair    Condition at Discharge: Stable    Rehab Potential (if transferring to Rehab): Fair    Recommended Labs or Other Treatments After Discharge:   Non weight bearing to operative extremity   Knee immobilizer at all times with exception of skin care and hygiene  NO ROM to right knee  Follow up with orthopedic surgeon, Dr. Joni Cheadle. Luz Maria Rosas in two weeks     Physician Certification: I certify the above information and transfer of Anika Gambino  is necessary for the continuing treatment of the diagnosis listed and that she requires Mark Harley for greater 30 days.      Update Admission H&P: No change in H&P    PHYSICIAN ASSISTANT SIGNATURE:  Electronically signed by Savanna Estrella Do Bird PA-C on 9/3/22 at 10:43 AM EDT

## 2022-08-30 NOTE — ANESTHESIA PRE PROCEDURE
Department of Anesthesiology  Preprocedure Note       Name:  Anjana Guillory   Age:  80 y.o.  :  1928                                          MRN:  [id number]         Date:  2022      Surgeon: Lord Kasper):  Kevin Duenas MD    Procedure: Procedure(s):  OPEN REDUCTION INTERNAL FIXATION LEFT DISTAL FEMORAL FRACTURE WITH SYNTHES LOCKING PLATE  NOTIFIED    Medications prior to admission:   Prior to Admission medications    Medication Sig Start Date End Date Taking?  Authorizing Provider   donepezil (ARICEPT) 5 MG tablet Take 5 mg by mouth nightly   Yes Historical Provider, MD   insulin glargine (LANTUS) 100 UNIT/ML injection vial Inject 6 Units into the skin nightly   Yes Historical Provider, MD   insulin lispro (HUMALOG) 100 UNIT/ML injection vial Inject into the skin 3 times daily (before meals)   Yes Historical Provider, MD   mirtazapine (REMERON) 15 MG tablet Take 7.5 mg by mouth nightly   Yes Historical Provider, MD   memantine (NAMENDA) 5 MG tablet Take 5 mg by mouth in the morning and at bedtime 22   Historical Provider, MD   Magnesium Oxide (MAGNESIUM-OXIDE) 250 MG TABS tablet Take 250 mg by mouth in the morning and at bedtime    Historical Provider, MD   insulin glargine (LANTUS) 100 UNIT/ML injection vial Inject 8 Units into the skin 2 times daily  Patient taking differently: Inject 17 Units into the skin daily 21   Lalo Cardoza MD   carvedilol (COREG) 6.25 MG tablet Take 1 tablet by mouth 2 times daily 21   Lalo Cardoza MD   isosorbide mononitrate (IMDUR) 30 MG extended release tablet Take 1 tablet by mouth daily 21   Lalo Cardoza MD   aspirin 81 MG EC tablet Take 1 tablet by mouth daily 21   Beulah Dela Cruz MD   SITagliptin (JANUVIA) 100 MG tablet Take 25 mg by mouth daily Take half a tablet daily 21   Beulah Dela Cruz MD   glucose (GLUTOSE) 40 % GEL Take 37.5 mLs by mouth as needed (Low blood sugar less than 80) 21   Beulah Dela Cruz MD   potassium chloride (KLOR-CON M) 10 MEQ extended release tablet Take 1 tablet by mouth daily  Patient not taking: Reported on 4/11/2022 8/13/21   Joie Cuevas MD       Current medications:    Current Facility-Administered Medications   Medication Dose Route Frequency Provider Last Rate Last Admin    oxyCODONE (ROXICODONE) immediate release tablet 5 mg  5 mg Oral Q4H PRN Deatra Cambric, PA-C   5 mg at 08/30/22 1357    Or    oxyCODONE (ROXICODONE) immediate release tablet 10 mg  10 mg Oral Q4H PRN Deatra Cambric, PA-C        sodium chloride flush 0.9 % injection 5-40 mL  5-40 mL IntraVENous 2 times per day Elyce Pellant, PA-C   10 mL at 08/29/22 2047    sodium chloride flush 0.9 % injection 5-40 mL  5-40 mL IntraVENous PRN Elyce Pellant, PA-C        0.9 % sodium chloride infusion   IntraVENous PRN Elyce PellMARK storm-C        [Held by provider] heparin (porcine) injection 5,000 Units  5,000 Units SubCUTAneous BID Lashanda Hernández PA-C        trimethobenzamide Maxcine Fill) injection 100 mg  100 mg IntraMUSCular Q8H PRN Elyce MARK Reynaga-C        [Held by provider] aspirin EC tablet 81 mg  81 mg Oral Daily Janny Welch PA-C        carvedilol (COREG) tablet 6.25 mg  6.25 mg Oral BID Janny Welch PA-C   6.25 mg at 08/29/22 2046    donepezil (ARICEPT) tablet 5 mg  5 mg Oral Nightly Janny Welch PA-C        insulin glargine (LANTUS) injection vial 17 Units  17 Units SubCUTAneous Daily Janny Welch PA-C        insulin glargine (LANTUS) injection vial 6 Units  6 Units SubCUTAneous Nightly Janny Welch PA-C   6 Units at 08/29/22 2050    isosorbide mononitrate (IMDUR) extended release tablet 30 mg  30 mg Oral Daily Janny Welch PA-C        magnesium oxide (MAG-OX) tablet 400 mg  400 mg Oral BID Janny Welch PA-C   400 mg at 08/29/22 2046    memantine (NAMENDA) tablet 5 mg  5 mg Oral BID Janny Welch PA-C   5 mg at 08/29/22 2045    mirtazapine (REMERON) tablet 7.5 mg  7.5 mg Oral Nightly Kirill Roa PA-C   7.5 mg at 08/29/22 2044    glucose chewable tablet 16 g  4 tablet Oral PRN Kirill Roa PA-C        dextrose bolus 10% 125 mL  125 mL IntraVENous PRN Kirill Roa PA-C        Or    dextrose bolus 10% 250 mL  250 mL IntraVENous PRN Kirill Roa PA-C        glucagon (rDNA) injection 1 mg  1 mg SubCUTAneous PRN Kirill Roa PA-C        dextrose 10 % infusion   IntraVENous Continuous PRN Kirill Roa PA-C        methocarbamol (ROBAXIN) tablet 500 mg  500 mg Oral Q6H Kirill Roa PA-C   500 mg at 08/29/22 2258    insulin lispro (HUMALOG) injection vial 0-8 Units  0-8 Units SubCUTAneous TID WC Kirill Roa PA-C        insulin lispro (HUMALOG) injection vial 0-4 Units  0-4 Units SubCUTAneous Nightly Kirill Roa PA-C   4 Units at 08/29/22 2049    lactated ringers infusion   IntraVENous Continuous Kirill Roa PA-C 75 mL/hr at 08/30/22 1710 New Bag at 08/30/22 0907    melatonin disintegrating tablet 5 mg  5 mg Oral Nightly Kirill Roa PA-C   5 mg at 08/29/22 2044    sennosides-docusate sodium (SENOKOT-S) 8.6-50 MG tablet 1 tablet  1 tablet Oral BID Kirill Roa PA-C   1 tablet at 08/29/22 2045    polyethylene glycol (GLYCOLAX) packet 17 g  17 g Oral Daily PRN Kirill Roa PA-C        ceFAZolin (ANCEF) 2000 mg in dextrose 5 % 100 mL IVPB  2,000 mg IntraVENous On Call to Shannon Can MD           Allergies:     Allergies   Allergen Reactions    Tylenol [Acetaminophen] Nausea And Vomiting and Rash       Problem List:    Patient Active Problem List   Diagnosis Code    Ischemic cardiomyopathy I25.5    CAD (coronary artery disease) I25.10    Hypertension I10    Pure hypercholesterolemia E78.00    Closed nondisp transvrs fracture of right patella with routine healing S82.034D    Diabetes mellitus ([address] E11.9    Pacemaker Z95.0    Cardiomyopathy ([address] I42.9    Closed fracture of right distal radius and ulna S52.501A, S52.601A    Closed nondisplaced transverse fracture of right patella with routine healing S82.034D    Type 2 diabetes mellitus with hyperosmolar hyperglycemic state (HHS) ([facility] E11.00, E11.65    Hyperglycemia R73.9    Closed disp comminuted fracture of shaft of right femur with malunion S72.351P       Past Medical History:        Diagnosis Date    Anemia     Atrial fibrillation (HCC)     CAD (coronary artery disease)     Cardiomyopathy ([facility]     CHF (congestive heart failure) ([facility]     [facility]     Hypertension        Past Surgical History:        Procedure Laterality Date    APPENDECTOMY      CORONARY ANGIOPLASTY WITH STENT PLACEMENT      FOREARM SURGERY Right 5/13/2021    CLOSED  REDUCTION INTERNAL FIXATION RIGHT  DISTAL RADIUS PINNING performed by Romulo Merino MD at [address] Left 12/2012    PACEMAKER INSERTION      TONSILLECTOMY      WRIST FRACTURE SURGERY Left 12/2012       Social History:    Social History     Tobacco Use    Smoking status: Former    Smokeless tobacco: Never   Substance Use Topics    Alcohol use: Not Currently                                Counseling given: Not Answered      Vital Signs (Current):   Vitals:    08/30/22 0811 08/30/22 0950 08/30/22 1215 08/30/22 1515   BP: 108/60  (!) 103/58 (!) 121/57   Pulse: 86  88 71   Resp: 20 20 16 16   Temp: 97.3 °F (36.3 °C)  98.6 °F (37 °C)    TempSrc: Oral  Oral    SpO2: 98%  94% 97%   Weight:       Height:                                                  BP Readings from Last 3 Encounters:   08/30/22 (!) 121/57   05/15/22 (!) 146/66   04/26/22 118/60       NPO Status: Time of last liquid consumption: 0000                        Time of last solid consumption: 0000                        Date of last liquid consumption: 08/30/22                        Date of last solid food consumption: 08/30/22    BMI:   Wt Readings from Last 3 Encounters:   08/29/22 110 lb (49.9 kg)   05/15/22 115 lb (52.2 kg)   04/26/22 116 lb (52.6 kg)     Body mass index is 19.49 kg/m².     CBC:   Lab Results   Component Value Date/Time    WBC 8.8 08/30/2022 05:20 AM    RBC 3.67 08/30/2022 05:20 AM    HGB 10.6 08/30/2022 05:20 AM    HCT 32.2 08/30/2022 05:20 AM    MCV 87.8 08/30/2022 05:20 AM    RDW 14.6 08/30/2022 05:20 AM     08/30/2022 05:20 AM       CMP:   Lab Results   Component Value Date/Time     08/30/2022 05:20 AM    K 4.9 08/30/2022 05:20 AM    K 3.8 08/18/2021 06:08 AM     08/30/2022 05:20 AM    CO2 24 08/30/2022 05:20 AM    BUN 36 08/30/2022 05:20 AM    CREATININE 1.99 08/30/2022 05:20 AM    GFRAA 28.2 08/30/2022 05:20 AM    LABGLOM 23.3 08/30/2022 05:20 AM    GLUCOSE 77 08/30/2022 05:20 AM    GLUCOSE 74 06/08/2021 05:51 AM    PROT 7.0 08/29/2022 09:30 AM    CALCIUM 9.3 08/30/2022 05:20 AM    BILITOT <0.2 08/29/2022 09:30 AM    ALKPHOS 208 08/29/2022 09:30 AM    AST 26 08/29/2022 09:30 AM    ALT 12 08/29/2022 09:30 AM       POC Tests:   Recent Labs     08/30/22  1144   POCGLU 109*       Coags:   Lab Results   Component Value Date/Time    PROTIME 13.2 08/29/2022 09:30 AM    INR 1.0 08/29/2022 09:30 AM    APTT 31.3 08/29/2022 09:30 AM       HCG (If Applicable): No results found for: PREGTESTUR, PREGSERUM, HCG, HCGQUANT     ABGs: No results found for: PHART, PO2ART, EYW0FIK, TQS8OJB, BEART, W7WQGBAS     Type & Screen (If Applicable):  No results found for: LABABO, LABRH    Drug/Infectious Status (If Applicable):  No results found for: HIV, HEPCAB    COVID-19 Screening (If Applicable):   Lab Results   Component Value Date/Time    COVID19 Not Detected 08/17/2021 12:27 PM           Anesthesia Evaluation    Airway: Mallampati: II  TM distance: >3 FB   Neck ROM: full  Mouth opening: > = 3 FB   Dental: normal exam         Pulmonary:Negative Pulmonary ROS breath sounds clear to auscultation                             Cardiovascular:    (+) hypertension:, pacemaker: pacemaker, CAD: no interval change, CHF:,       ECG reviewed  Rhythm: regular             Beta Blocker:  Dose within 24 Hrs         Neuro/Psych:   (+) dementia            GI/Hepatic/Renal: Neg GI/Hepatic/Renal ROS            Endo/Other:    (+) DiabetesType II DM, using insulin, . Abdominal:             Vascular: negative vascular ROS. Other Findings:           Anesthesia Plan      general and regional     ASA 4     (US guided Femoral nerve block)  Induction: intravenous. MIPS: Postoperative opioids intended and Prophylactic antiemetics administered. Anesthetic plan and risks discussed with healthcare power of . Use of blood products discussed with healthcare power of  whom consented to blood products.    Plan discussed with surgical team.    Attending anesthesiologist reviewed and agrees with Preprocedure content                Pasquale Vasques MD   8/30/2022

## 2022-08-30 NOTE — ADDENDUM NOTE
Addendum  created 08/30/22 5560 by Danielle Liao MD    Order list changed, Order sets accessed, Pharmacy for encounter modified

## 2022-08-30 NOTE — PROGRESS NOTES
Having pain this AM    CT scan reviewed, obvious comminuted L distal femur fx  Will proceed with ORIF to maximize pain control and mobilization    Risks noted at bedside including infection, stiffness, nonunion, further surgery  Poss OR today

## 2022-08-30 NOTE — ANESTHESIA PROCEDURE NOTES
Peripheral Block    Patient location during procedure: pre-op  Reason for block: post-op pain management and at surgeon's request  Start time: 8/30/2022 3:45 PM  End time: 8/30/2022 3:50 PM  Staffing  Performed: anesthesiologist   Anesthesiologist: Wolf Worley MD  Preanesthetic Checklist  Completed: patient identified, IV checked, site marked, risks and benefits discussed, surgical/procedural consents, equipment checked, pre-op evaluation, timeout performed, anesthesia consent given, oxygen available and monitors applied/VS acknowledged  Peripheral Block   Patient position: supine  Prep: ChloraPrep  Provider prep: mask and sterile gloves (Sterile probe cover)  Patient monitoring: cardiac monitor, continuous pulse ox, frequent blood pressure checks and IV access  Block type: Femoral  Laterality: left  Injection technique: single-shot  Guidance: ultrasound guided  Local infiltration: bupivacaine  Infiltration strength: 0.25 %  Local infiltration: bupivacaine  Dose: 20 mL    Needle   Needle type: combined needle/nerve stimulator   Needle gauge: 22 G  Needle localization: anatomical landmarks and ultrasound guidance  Needle length: 5 cm  Assessment   Injection assessment: negative aspiration for heme, no paresthesia on injection and local visualized surrounding nerve on ultrasound  Paresthesia pain: immediately resolved  Slow fractionated injection: yes  Hemodynamics: stable  Real-time US image taken/store: yes    Additional Notes  Ultrasound image printed and saved in patient chart.     Sterile probe cover used

## 2022-08-30 NOTE — PROGRESS NOTES
TRAUMA DAILY PROGRESS NOTE      Patient Name: Dieter Paul  Admission Date 2022    Hospital Day: 1  Patient seen and examined on 2022    INTERVAL HISTORY/EVENTS     Background:  Dieter Paul is a 80 y.o. female with a PMHx of HTN, dementia, HLD, T2DM presents as a CAT2 trauma s/p echanical fall from standing on 22 (-) head strike, (-) LOC, (-) AC/AP. Admitted to trauma and Ortho on as consult. Trauma workup found acute comminuted intra-articular left distal femur fracture. Hospital Course:  2022: FFS at nursing home. Admitted to RNF.      24 Hour Events:  C/o left heel pain \"inside the heel\". Has been NPO since midnight. Family at bedside. Anticipating OR with Ortho this afternoon for ORIF of left femur fracture. PHYSICAL EXAM     Vitals Average, Min and Max for last 24 hours:  Temp: Temp: 97.3 °F (36.3 °C); Temp  Av.7 °F (36.5 °C)  Min: 97.3 °F (36.3 °C)  Max: 98.2 °F (36.8 °C)  Respirations: Resp  Av.5  Min: 15  Max: 20  Pulse: Pulse  Av.2  Min: 67  Max: 95  Blood Pressure: Systolic (27JRN), MLX:492 , Min:108 , PVY:449   ; Diastolic (91YHV), QDF:15, Min:52, Max:65  SpO2: SpO2  Av.3 %  Min: 94 %  Max: 100 %    24hr Intake/Output: No intake or output data in the 24 hours ending 22 1116    Vitals: /60   Pulse 86   Temp 97.3 °F (36.3 °C) (Oral)   Resp 20   Ht 5' 3\" (1.6 m)   Wt 110 lb (49.9 kg)   SpO2 98%   BMI 19.49 kg/m²     Physical Exam:  Constitutional: Lying supine in bed. Uncomfortable appearing. Daughter/POA at bedside. HEENT: Atraumatic normocephalic. Hard of hearing. Cardiovascular: Regular rate and rhythm. Pulmonary: Clear to ausculation bilaterally on room air. No wheezing, rhonchi or rales. Abdominal: Soft. Non-distended. Non-tender. Musculoskeletal: LLE with slight contracture of left knee. Ace wrap removed around left ankle due to increased pressure on left heel. N/V intact distally.   Neurological: Alert, awake, and orientated x 3. Motor and sensory grossly intact. No focal deficits. GCS of 15. LABORATORY RESULTS (LAST 24 HOURS)     CBC with Differential:    Lab Results   Component Value Date/Time    WBC 8.8 08/30/2022 05:20 AM    RBC 3.67 08/30/2022 05:20 AM    HGB 10.6 08/30/2022 05:20 AM    HCT 32.2 08/30/2022 05:20 AM     08/30/2022 05:20 AM    MCV 87.8 08/30/2022 05:20 AM    MCH 29.0 08/30/2022 05:20 AM    MCHC 33.0 08/30/2022 05:20 AM    RDW 14.6 08/30/2022 05:20 AM    BANDSPCT 2 05/15/2022 03:45 PM    LYMPHOPCT 9.0 08/29/2022 09:30 AM    MONOPCT 4.7 08/29/2022 09:30 AM    BASOPCT 0.9 08/29/2022 09:30 AM    MONOSABS 0.4 08/29/2022 09:30 AM    LYMPHSABS 0.8 08/29/2022 09:30 AM    EOSABS 0.2 08/29/2022 09:30 AM    BASOSABS 0.0 08/29/2022 09:30 AM     CMP:    Lab Results   Component Value Date/Time     08/30/2022 05:20 AM    K 4.9 08/30/2022 05:20 AM    K 3.8 08/18/2021 06:08 AM     08/30/2022 05:20 AM    CO2 24 08/30/2022 05:20 AM    BUN 36 08/30/2022 05:20 AM    CREATININE 1.99 08/30/2022 05:20 AM    GFRAA 28.2 08/30/2022 05:20 AM    LABGLOM 23.3 08/30/2022 05:20 AM    GLUCOSE 77 08/30/2022 05:20 AM    GLUCOSE 74 06/08/2021 05:51 AM    PROT 7.0 08/29/2022 09:30 AM    LABALBU 3.7 08/29/2022 09:30 AM    CALCIUM 9.3 08/30/2022 05:20 AM    BILITOT <0.2 08/29/2022 09:30 AM    ALKPHOS 208 08/29/2022 09:30 AM    AST 26 08/29/2022 09:30 AM    ALT 12 08/29/2022 09:30 AM     Magnesium:    Lab Results   Component Value Date/Time    MG 2.5 08/30/2022 05:20 AM     PT/INR:    Lab Results   Component Value Date/Time    PROTIME 13.2 08/29/2022 09:30 AM    INR 1.0 08/29/2022 09:30 AM     PTT:    Lab Results   Component Value Date/Time    APTT 31.3 08/29/2022 09:30 AM       IMAGING RESULTS (PERSONALLY REVIEWED)     All admission and follow up imaging reviewed.     ASSESSMENT & PLAN     Diagnoses:  S/p fall from standing on 8/30  Acute comminuted intra-articular left distal femur fracture  Acute pain due to trauma    PMHx: HTN, dementia, HLD, T2DM    Incidental Findings: None, JLR 8/30/22      ASSESSMENT/PLAN:  Neurological: Acute pain due to trauma, Hx dementia   - Continue Oxycodone 2.5/5mg q4 PRN mod/severe pain  - Continue Robaxin 500mg q6   - Continue home donepezil 5mg daily, memantine 5mg BID, mirtazapine 7.5mg HS  - Continue Melatonin 5mg HS    Cardiovascular: Hx HTN, afib, HLD  - Continue vital signs per unit protocol  - Continue home ASA, carvedilol 6.25mg BID, imdur 30mg daily     Respiratory: No acute concerns  - Maintain O2 sats > 92%, encourage IS. GI/Diet: No acute concerns  - Regular diet 4 carb choices. NPO at midnight  - Bowel regimen: Senokot BID, miralax daily PRN     Renal/Electrolytes: No electrolyte abnormalities. BUN/Cr elevated at 27/1.32-> 36/1. 99. Appears to be patient's baseline  - LR @ 75ml/hr while NPO  - Continue home mag oxide 40mg BID  - AM BMP     ID: No leukocytosis, afebrile  - No indication for abx     Heme: No acute concerns  - No indication for transfusion  - AM CBC     Endocrine: Hx T2DM  - Continue home lantus 17 Units qAM, lantus 6 units HS  - ISS AC/HS  - Glucose check AC/HS     MSK: Left distal femur fracture   - Plan for OR today with ortho  - Spines: Clear  - Weight Bearing Restrictions: NWB LLE  - Activity: Strict bedrest until OR with ortho  - PT/OT: Will consult post-op     Prophylaxis:   - SCDs, heparin 5000units BID due to kidney function  - No indication for GI PPx    Dispo: Remain on trauma service for plans for OR with Ortho. Accom Status: General Status      Follow up with Ortho TBD. Please call for any questions or concerns.   Yamilka Cormier Treatment Team Sticky Note for contact information]      Karla Dent PA-C  Trauma/Critical Care  [see Treatment Team Sticky Note for contact information]     This patient's plan of care was discussed and made in collaboration with Trauma Attending physician, Kermit Andino MD.

## 2022-08-30 NOTE — ANESTHESIA POSTPROCEDURE EVALUATION
Department of Anesthesiology  Postprocedure Note    Patient: Phi Patel  MRN: [id number]  YOB: 1928  Date of evaluation: 8/30/2022      Procedure Summary     Date: 08/30/22 Room / Location: [address] [place] [address]    Anesthesia Start: 2298 Anesthesia Stop: 9915    Procedure: OPEN REDUCTION INTERNAL FIXATION LEFT DISTAL FEMORAL FRACTURE (Left: Leg Upper) Diagnosis:       Other closed fracture of distal end of left femur, [facility]      (OTHER CLOSED FRACTURE OF DISTAL END OF LEFT FEMUR)    Surgeons: Aida Eller MD Responsible Provider: Debora Shannon MD    Anesthesia Type: general, regional ASA Status: 4          Anesthesia Type: No value filed.     Sunshine Phase I:      Sunshine Phase II:        Anesthesia Post Evaluation    Patient location during evaluation: PACU  Patient participation: complete - patient participated  Level of consciousness: awake and alert  Pain score: 2  Airway patency: patent  Nausea & Vomiting: no vomiting and no nausea  Complications: no  Cardiovascular status: hemodynamically stable  Respiratory status: nasal cannula  Hydration status: stable

## 2022-08-30 NOTE — BRIEF OP NOTE
Brief Postoperative Note      Patient: Tati Everett  YOB: 1928  MRN: [id number]    Date of Procedure: 8/30/2022    Pre-Op Diagnosis: OTHER CLOSED FRACTURE OF DISTAL END OF LEFT FEMUR    Post-Op Diagnosis: Same       Procedure(s):  OPEN REDUCTION INTERNAL FIXATION LEFT DISTAL FEMORAL FRACTURE    Surgeon(s):  Janie Govea MD    Assistant:  Physician Assistant: Unique Steiner PA-C    Anesthesia: Choice    Estimated Blood Loss (mL): Minimal    Complications: None    Specimens:   * No specimens in log *    Implants:  Implant Name Type Inv.  Item Serial No.  Lot No. LRB No. Used Action   PLATE BNE [id number] 12 H L CNDYL S STL EDMAR ANG MAKAYLA COMPR CRV - [id number]  PLATE BNE [id number] 12 H L CNDYL S STL EDMAR ANG MAKAYLA COMPR CRV [id number] [employer]  Left 1 Implanted   SCREW BNE L12MM DIA5MM CNDYL S STL ST EDMAR ANG MAKAYLA COMPR T25 - [id number]  SCREW BNE L12MM DIA5MM CNDYL S STL ST EDMAR ANG MAKAYLA COMPR T25 02.231.012 [employer]  Left 2 Implanted   SCREW BNE L34MM DIA5MM CNDYL S STL ST EDMAR ANG MAKAYLA FULL THRD - [id number]  SCREW BNE L34MM DIA5MM CNDYL S STL ST EDMAR ANG MAKAYLA FULL THRD 02.231.234 [employer]  Left 2 Implanted   SCREW BNE L36MM DIA5MM CNDYL S STL ST EDMAR ANG MAKAYLA COMPR T25 - [id number]  SCREW BNE L36MM DIA5MM CNDYL S STL ST EDMAR ANG MAKAYLA COMPR T25 02.231.236 [employer]  Left 1 Implanted   SCREW BNE L38MM DIA5MM CNDYL S STL ST EDMAR ANG MAKAYLA T25 - [id number]  SCREW BNE L38MM DIA5MM CNDYL S STL ST EDMAR ANG MAKAYLA T25 02.231.238 [employer]  Left 1 Implanted   SCREW BNE L65MM DIA5MM CNDYL S STL ST EDMAR ANG NEY MAKAYLA FULL - [id number]  SCREW BNE L65MM DIA5MM CNDYL S STL ST EDMAR ANG NEY MAKAYLA FULL 02.231.665 [employer]  Left 1 Implanted   SCREW BNE L70MM DIA5MM S STL SELF DRL EDMAR ANG NEY MAKAYLA FULL - [id number]  SCREW BNE L70MM DIA5MM S STL SELF DRL EDMAR ANG NEY MAKAYLA FULL 02.231.670 [employer]  Left 1 Implanted   SCREW BNE L75MM DIA5MM CNDYL S STL ST EDMAR ANG NEY MAKAYLA FULL - [id number]  SCREW BNE L75MM DIA5MM CNDYL S STL ST EDMAR ANG NEY MAKAYLA FULL 02.231.675 [employer]-  Left 3 Implanted   SCREW BNE L34MM DIA4. 5MM PROX RON TIB S STL ST MAKAYLA FULL - [id number]  SCREW BNE L34MM DIA4. 5MM PROX RON TIB S STL ST MAKAYLA FULL 214.834 [employer]  Left 1 Implanted   SCREW BNE L45MM DIA3.5MM RON S STL ST NONCANNULATED MAKAYLA - [id number]  SCREW BNE L45MM DIA3.5MM RON S STL ST NONCANNULATED MAKAYLA 204.845 [employer]  Left 1 Implanted         Drains: * No LDAs found *    Findings: none    Electronically signed by Ana Paula Jean MD on 8/30/2022 at 5:25 PM

## 2022-08-30 NOTE — CARE COORDINATION
MABLE SPOKE WITH ZOE AT [facility] AND SHE EXPLAINED THAT THE PT IS FROM [facility] AND SHE WILL LIKELY NEED TO RETURN TO THE SKILLED UNIT AT [place].      MABLE SPOKE WITH THE PT'S DAUGHTER Nicho De León PT TO GO SKILLED AT . [facility].

## 2022-08-31 ENCOUNTER — OFFICE VISIT (OUTPATIENT)
Dept: GERIATRIC MEDICINE | Age: 87
End: 2022-08-31

## 2022-08-31 DIAGNOSIS — Z79.4 TYPE 2 DIABETES MELLITUS WITHOUT COMPLICATION, WITH LONG-TERM CURRENT USE OF INSULIN (HCC): Primary | ICD-10-CM

## 2022-08-31 DIAGNOSIS — E11.9 TYPE 2 DIABETES MELLITUS WITHOUT COMPLICATION, WITH LONG-TERM CURRENT USE OF INSULIN (HCC): Primary | ICD-10-CM

## 2022-08-31 PROBLEM — S72.351P CLOSED DISP COMMINUTED FRACTURE OF SHAFT OF RIGHT FEMUR WITH MALUNION: Status: RESOLVED | Noted: 2022-08-29 | Resolved: 2022-08-31

## 2022-08-31 PROBLEM — G89.11 ACUTE PAIN DUE TO TRAUMA: Status: ACTIVE | Noted: 2022-08-29

## 2022-08-31 PROBLEM — G89.18 POST-OP PAIN: Status: ACTIVE | Noted: 2022-08-30

## 2022-08-31 LAB
ANION GAP SERPL CALCULATED.3IONS-SCNC: 14 MEQ/L (ref 9–15)
BASOPHILS ABSOLUTE: 0 K/UL (ref 0–0.2)
BASOPHILS RELATIVE PERCENT: 0.2 %
BUN BLDV-MCNC: 34 MG/DL (ref 8–23)
CALCIUM SERPL-MCNC: 8.4 MG/DL (ref 8.5–9.9)
CHLORIDE BLD-SCNC: 101 MEQ/L (ref 95–107)
CO2: 21 MEQ/L (ref 20–31)
CREAT SERPL-MCNC: 1.61 MG/DL (ref 0.5–0.9)
EOSINOPHILS ABSOLUTE: 0 K/UL (ref 0–0.7)
EOSINOPHILS RELATIVE PERCENT: 0 %
GFR AFRICAN AMERICAN: 36
GFR SERPL CREATININE-BSD FRML MDRD: 29.4 ML/MIN/{1.73_M2}
GLUCOSE BLD-MCNC: 106 MG/DL (ref 70–99)
GLUCOSE BLD-MCNC: 165 MG/DL (ref 70–99)
GLUCOSE BLD-MCNC: 275 MG/DL (ref 70–99)
GLUCOSE BLD-MCNC: 299 MG/DL (ref 70–99)
GLUCOSE BLD-MCNC: 375 MG/DL (ref 70–99)
GLUCOSE BLD-MCNC: 83 MG/DL (ref 70–99)
GLUCOSE BLD-MCNC: 85 MG/DL (ref 70–99)
HCT VFR BLD CALC: 22.5 % (ref 37–47)
HCT VFR BLD CALC: 24.8 % (ref 37–47)
HEMOGLOBIN: 7.3 G/DL (ref 12–16)
HEMOGLOBIN: 8 G/DL (ref 12–16)
LYMPHOCYTES ABSOLUTE: 0.9 K/UL (ref 1–4.8)
LYMPHOCYTES RELATIVE PERCENT: 11 %
MAGNESIUM: 2.1 MG/DL (ref 1.7–2.4)
MCH RBC QN AUTO: 28.7 PG (ref 27–31.3)
MCH RBC QN AUTO: 28.7 PG (ref 27–31.3)
MCHC RBC AUTO-ENTMCNC: 32.3 % (ref 33–37)
MCHC RBC AUTO-ENTMCNC: 32.7 % (ref 33–37)
MCV RBC AUTO: 87.8 FL (ref 82–100)
MCV RBC AUTO: 88.7 FL (ref 82–100)
MONOCYTES ABSOLUTE: 0.6 K/UL (ref 0.2–0.8)
MONOCYTES RELATIVE PERCENT: 7.2 %
NEUTROPHILS ABSOLUTE: 6.6 K/UL (ref 1.4–6.5)
NEUTROPHILS RELATIVE PERCENT: 81.6 %
PDW BLD-RTO: 14.4 % (ref 11.5–14.5)
PDW BLD-RTO: 14.7 % (ref 11.5–14.5)
PERFORMED ON: ABNORMAL
PERFORMED ON: NORMAL
PERFORMED ON: NORMAL
PLATELET # BLD: 133 K/UL (ref 130–400)
PLATELET # BLD: 140 K/UL (ref 130–400)
POTASSIUM SERPL-SCNC: 5.1 MEQ/L (ref 3.4–4.9)
RBC # BLD: 2.56 M/UL (ref 4.2–5.4)
RBC # BLD: 2.79 M/UL (ref 4.2–5.4)
SODIUM BLD-SCNC: 136 MEQ/L (ref 135–144)
WBC # BLD: 8 K/UL (ref 4.8–10.8)
WBC # BLD: 8.2 K/UL (ref 4.8–10.8)

## 2022-08-31 PROCEDURE — 6360000002 HC RX W HCPCS: Performed by: SURGERY

## 2022-08-31 PROCEDURE — 6360000002 HC RX W HCPCS: Performed by: ORTHOPAEDIC SURGERY

## 2022-08-31 PROCEDURE — 80048 BASIC METABOLIC PNL TOTAL CA: CPT

## 2022-08-31 PROCEDURE — 85027 COMPLETE CBC AUTOMATED: CPT

## 2022-08-31 PROCEDURE — 97166 OT EVAL MOD COMPLEX 45 MIN: CPT

## 2022-08-31 PROCEDURE — 97163 PT EVAL HIGH COMPLEX 45 MIN: CPT

## 2022-08-31 PROCEDURE — 6360000002 HC RX W HCPCS: Performed by: PHYSICIAN ASSISTANT

## 2022-08-31 PROCEDURE — 2580000003 HC RX 258: Performed by: ORTHOPAEDIC SURGERY

## 2022-08-31 PROCEDURE — 36415 COLL VENOUS BLD VENIPUNCTURE: CPT

## 2022-08-31 PROCEDURE — P9041 ALBUMIN (HUMAN),5%, 50ML: HCPCS | Performed by: SURGERY

## 2022-08-31 PROCEDURE — 1210000000 HC MED SURG R&B

## 2022-08-31 PROCEDURE — 6370000000 HC RX 637 (ALT 250 FOR IP): Performed by: STUDENT IN AN ORGANIZED HEALTH CARE EDUCATION/TRAINING PROGRAM

## 2022-08-31 PROCEDURE — 2580000003 HC RX 258: Performed by: SURGERY

## 2022-08-31 PROCEDURE — 83735 ASSAY OF MAGNESIUM: CPT

## 2022-08-31 PROCEDURE — 99233 SBSQ HOSP IP/OBS HIGH 50: CPT | Performed by: PHYSICIAN ASSISTANT

## 2022-08-31 RX ORDER — METHOCARBAMOL 500 MG/1
500 TABLET, FILM COATED ORAL EVERY 6 HOURS
Qty: 40 TABLET | Refills: 0 | Status: SHIPPED | OUTPATIENT
Start: 2022-08-31 | End: 2022-09-10

## 2022-08-31 RX ORDER — ENOXAPARIN SODIUM 100 MG/ML
30 INJECTION SUBCUTANEOUS 2 TIMES DAILY
Qty: 16.8 ML | Refills: 0 | Status: SHIPPED | OUTPATIENT
Start: 2022-08-31 | End: 2022-09-28

## 2022-08-31 RX ORDER — ACETAMINOPHEN 325 MG/1
650 TABLET ORAL EVERY 6 HOURS
Qty: 112 TABLET | Refills: 0 | Status: SHIPPED | OUTPATIENT
Start: 2022-08-31 | End: 2022-09-03 | Stop reason: HOSPADM

## 2022-08-31 RX ORDER — HEPARIN SODIUM 5000 [USP'U]/ML
5000 INJECTION, SOLUTION INTRAVENOUS; SUBCUTANEOUS EVERY 8 HOURS SCHEDULED
Status: DISCONTINUED | OUTPATIENT
Start: 2022-08-31 | End: 2022-09-04 | Stop reason: HOSPADM

## 2022-08-31 RX ORDER — SENNA AND DOCUSATE SODIUM 50; 8.6 MG/1; MG/1
1 TABLET, FILM COATED ORAL 2 TIMES DAILY
Qty: 28 TABLET | Refills: 0 | Status: SHIPPED | OUTPATIENT
Start: 2022-08-31 | End: 2022-09-14

## 2022-08-31 RX ORDER — SODIUM CHLORIDE, SODIUM LACTATE, POTASSIUM CHLORIDE, CALCIUM CHLORIDE 600; 310; 30; 20 MG/100ML; MG/100ML; MG/100ML; MG/100ML
INJECTION, SOLUTION INTRAVENOUS ONCE
Status: COMPLETED | OUTPATIENT
Start: 2022-08-31 | End: 2022-08-31

## 2022-08-31 RX ORDER — INSULIN GLARGINE 100 [IU]/ML
17 INJECTION, SOLUTION SUBCUTANEOUS DAILY
Qty: 1 EACH | Refills: 0 | Status: SHIPPED | OUTPATIENT
Start: 2022-08-31

## 2022-08-31 RX ORDER — OXYCODONE HYDROCHLORIDE 5 MG/1
5 TABLET ORAL EVERY 6 HOURS PRN
Qty: 28 TABLET | Refills: 0 | Status: SHIPPED | OUTPATIENT
Start: 2022-08-31 | End: 2022-09-07

## 2022-08-31 RX ADMIN — ENOXAPARIN SODIUM 30 MG: 100 INJECTION SUBCUTANEOUS at 08:44

## 2022-08-31 RX ADMIN — INSULIN GLARGINE 6 UNITS: 100 INJECTION, SOLUTION SUBCUTANEOUS at 21:00

## 2022-08-31 RX ADMIN — Medication 400 MG: at 08:44

## 2022-08-31 RX ADMIN — METHOCARBAMOL 500 MG: 500 TABLET ORAL at 05:24

## 2022-08-31 RX ADMIN — ALBUMIN (HUMAN) 12.5 G: 12.5 INJECTION, SOLUTION INTRAVENOUS at 00:41

## 2022-08-31 RX ADMIN — METHOCARBAMOL 500 MG: 500 TABLET ORAL at 12:06

## 2022-08-31 RX ADMIN — ISOSORBIDE MONONITRATE 30 MG: 60 TABLET, EXTENDED RELEASE ORAL at 08:44

## 2022-08-31 RX ADMIN — INSULIN LISPRO 4 UNITS: 100 INJECTION, SOLUTION INTRAVENOUS; SUBCUTANEOUS at 08:45

## 2022-08-31 RX ADMIN — CEFAZOLIN 1000 MG: 1 INJECTION, POWDER, FOR SOLUTION INTRAMUSCULAR; INTRAVENOUS at 05:24

## 2022-08-31 RX ADMIN — INSULIN GLARGINE 17 UNITS: 100 INJECTION, SOLUTION SUBCUTANEOUS at 08:45

## 2022-08-31 RX ADMIN — MEMANTINE 5 MG: 5 TABLET ORAL at 08:44

## 2022-08-31 RX ADMIN — HEPARIN SODIUM 5000 UNITS: 5000 INJECTION INTRAVENOUS; SUBCUTANEOUS at 21:00

## 2022-08-31 RX ADMIN — SODIUM CHLORIDE, POTASSIUM CHLORIDE, SODIUM LACTATE AND CALCIUM CHLORIDE: 600; 310; 30; 20 INJECTION, SOLUTION INTRAVENOUS at 14:21

## 2022-08-31 RX ADMIN — INSULIN LISPRO 8 UNITS: 100 INJECTION, SOLUTION INTRAVENOUS; SUBCUTANEOUS at 12:05

## 2022-08-31 RX ADMIN — SENNOSIDES AND DOCUSATE SODIUM 1 TABLET: 50; 8.6 TABLET ORAL at 08:44

## 2022-08-31 NOTE — PROGRESS NOTES
1930  Pt arrived from PACU. Denies pain at this time. Difficult to assess d/t pt being [address] and not understanding questioning. No movement noted to LLE. Pulses palpable. Dressing from left hip to knee C/D/I. Immobilizer and ice pack in place. 2330 Reassessment completed as charted in flowsheet. No neuro change to previous. BP 92/37 rpt 83/42 MAP 55. Call to Trauma and order for 5% Albumin 250mL and CBC obtained. 0145 Albumin completed. /46 MAP 64  Pt c/o some pain to LLE. Still no movement noted. Pulses palpable. Left heel reddened. Mepilex placed on b/l heels for prevention.

## 2022-08-31 NOTE — FLOWSHEET NOTE
1014-Patient alert and oriented, no complaints of pain at this time. 1029- Patient resting with eyes closed at this time.     Electronically signed by Akira Smith RN on 8/31/2022

## 2022-08-31 NOTE — CARE COORDINATION
Pt will transfer tonight at 6:30pm via [facility]. Daughter solitario and [facility] is ready for the pt to admit to them skilled. [id number] completed.

## 2022-08-31 NOTE — PROGRESS NOTES
Physician Progress Note      PATIENT:               Marlin Mayers  CSN #:                  [id number]  :                       1928  ADMIT DATE:       2022 8:45 AM  DISCH DATE:  RESPONDING  PROVIDER #:        JOSE CARLOS Major CNP          QUERY TEXT:    Pt admitted with hip fracture and has anemia documented. If possible, please   document in progress notes and discharge summary further specificity regarding   the acuity and type of anemia:    The medical record reflects the following:  Risk Factors: hip fracture with surgery  Clinical Indicators: H/H 13.6/40.2, 10.6/32.2, 7.3/22.5  Treatment: daily CBC    Jonathan JEFFREY, RN, CDS  [phone]  Options provided:  -- Anemia due to acute blood loss  -- Dilutional anemia  -- Other - I will add my own diagnosis  -- Disagree - Not applicable / Not valid  -- Disagree - Clinically unable to determine / Unknown  -- Refer to Clinical Documentation Reviewer    PROVIDER RESPONSE TEXT:    This patient has acute blood loss anemia.     Query created by: Иван Pfeiffer on 2022 9:29 AM      Electronically signed by:  Bryn Major CNP 2022 9:35 AM

## 2022-08-31 NOTE — PLAN OF CARE
Problem: Pain  Goal: Verbalizes/displays adequate comfort level or baseline comfort level  Outcome: Progressing     Problem: Skin/Tissue Integrity  Goal: Absence of new skin breakdown  Description: 1. Monitor for areas of redness and/or skin breakdown  2. Assess vascular access sites hourly  3. Every 4-6 hours minimum:  Change oxygen saturation probe site  4. Every 4-6 hours:  If on nasal continuous positive airway pressure, respiratory therapy assess nares and determine need for appliance change or resting period.   Outcome: Progressing     Problem: Safety - Adult  Goal: Free from fall injury  Outcome: Progressing     Problem: ABCDS Injury Assessment  Goal: Absence of physical injury  Outcome: Progressing     Problem: Chronic Conditions and Co-morbidities  Goal: Patient's chronic conditions and co-morbidity symptoms are monitored and maintained or improved  Outcome: Progressing     Problem: Discharge Planning  Goal: Discharge to home or other facility with appropriate resources  Outcome: Progressing

## 2022-08-31 NOTE — PROGRESS NOTES
Progress Note  Patient: Aixa Mcmanus  Unit/Bed: B742/H108-84  YOB: 1928  MRN: [id number]  Acct: [de-identified]   Admitting Diagnosis: Closed disp comminuted fracture of shaft of right femur with malunion [S72.351P]  Other closed fracture of distal end of left femur, initial encounter [facility]) [facility]  Date:  8/29/2022  Hospital Day: 2    Chief Complaint:  Closed displaced comminuted fracture of shaft of left femur with malunion          Physical Examination:    BP (!) 102/50   Pulse 96   Temp 98.8 °F (37.1 °C) (Oral)   Resp 16   Ht 5' 3\" (1.6 m)   Wt 110 lb (49.9 kg)   SpO2 97%   BMI 19.49 kg/m²    Physical Exam    Surgical extremity is neurovascular intact. Remains in knee immobilizer. Dressing clear dry and intact.     LABS:  CBC:   Lab Results   Component Value Date/Time    WBC 8.2 08/31/2022 05:20 AM    RBC 2.56 08/31/2022 05:20 AM    HGB 7.3 08/31/2022 05:20 AM    HCT 22.5 08/31/2022 05:20 AM    MCV 87.8 08/31/2022 05:20 AM    MCH 28.7 08/31/2022 05:20 AM    MCHC 32.7 08/31/2022 05:20 AM    RDW 14.7 08/31/2022 05:20 AM     08/31/2022 05:20 AM    MPV 7.5 01/31/2014 06:20 AM     CBC with Differential:   Lab Results   Component Value Date/Time    WBC 8.2 08/31/2022 05:20 AM    RBC 2.56 08/31/2022 05:20 AM    HGB 7.3 08/31/2022 05:20 AM    HCT 22.5 08/31/2022 05:20 AM     08/31/2022 05:20 AM    MCV 87.8 08/31/2022 05:20 AM    MCH 28.7 08/31/2022 05:20 AM    MCHC 32.7 08/31/2022 05:20 AM    RDW 14.7 08/31/2022 05:20 AM    BANDSPCT 2 05/15/2022 03:45 PM    LYMPHOPCT 11.0 08/30/2022 11:58 PM    MONOPCT 7.2 08/30/2022 11:58 PM    BASOPCT 0.2 08/30/2022 11:58 PM    MONOSABS 0.6 08/30/2022 11:58 PM    LYMPHSABS 0.9 08/30/2022 11:58 PM    EOSABS 0.0 08/30/2022 11:58 PM    BASOSABS 0.0 08/30/2022 11:58 PM     CMP:    Lab Results   Component Value Date/Time     08/31/2022 05:20 AM    K 5.1 08/31/2022 05:20 AM    K 3.8 08/18/2021 06:08 AM     08/31/2022 05:20 AM    CO2 21 08/31/2022 05:20 AM    BUN 34 08/31/2022 05:20 AM    CREATININE 1.61 08/31/2022 05:20 AM    GFRAA 36.0 08/31/2022 05:20 AM    LABGLOM 29.8 08/31/2022 05:20 AM    GLUCOSE 299 08/31/2022 05:20 AM    GLUCOSE 74 06/08/2021 05:51 AM    PROT 7.0 08/29/2022 09:30 AM    LABALBU 3.7 08/29/2022 09:30 AM    CALCIUM 8.4 08/31/2022 05:20 AM    BILITOT <0.2 08/29/2022 09:30 AM    ALKPHOS 208 08/29/2022 09:30 AM    AST 26 08/29/2022 09:30 AM    ALT 12 08/29/2022 09:30 AM     BMP:    Lab Results   Component Value Date/Time     08/31/2022 05:20 AM    K 5.1 08/31/2022 05:20 AM    K 3.8 08/18/2021 06:08 AM     08/31/2022 05:20 AM    CO2 21 08/31/2022 05:20 AM    BUN 34 08/31/2022 05:20 AM    LABALBU 3.7 08/29/2022 09:30 AM    CREATININE 1.61 08/31/2022 05:20 AM    CALCIUM 8.4 08/31/2022 05:20 AM    GFRAA 36.0 08/31/2022 05:20 AM    LABGLOM 29.8 08/31/2022 05:20 AM    GLUCOSE 299 08/31/2022 05:20 AM    GLUCOSE 74 06/08/2021 05:51 AM     Magnesium:    Lab Results   Component Value Date/Time    MG 2.1 08/31/2022 05:20 AM         Assessment:    Active Hospital Problems    Diagnosis Date Noted    Closed disp comminuted fracture of shaft of right femur with malunion [S72.351P] 08/29/2022     Priority: Medium     Patient is postop day 1 ORIF of intra-articular left distal femur fracture. Patient doing well postoperatively. 8/31/22  -Acute postoperative pain: Reports mild to moderate pain of operative extremity. Continue current pain regimen  -Anemia: Patient with history of anemia which has been exacerbated by surgery and fracture. Hgb this am 7.3. Patient asymptomatic, remains hemodynamically stable with no signs of active bleeding. Recommend daily CBC's to monitor hgb and transfuse as necessary      Plan:  Continue postoperative plan  PT/OT: NWB to operative extremity, in knee immobilizer at all times with the exception of skin care and hygiene. NO ROM of left knee.    Continue pain control per primary team  DVT prophylaxis per primary team   Discharge planning: plan is for patient to transfer to [facility] at discharge.        Electronically signedby TRA Hoover CNP on 8/31/2022 at 9:00 AM

## 2022-08-31 NOTE — DISCHARGE SUMMARY
[address]  [address]  [address]  MRN: [id number]  YOB: 1928  80 y. o.female      Attending  Cruz Jimenez MD ?   Date of Admission  8/29/2022 Date of Discharge  8/31/2022      ? DIAGNOSES:  Principal Problem (Resolved):    Closed disp comminuted fracture of shaft of right femur with malunion  Active Problems:    Acute pain due to trauma    Post-op pain         PROCEDURES:  8/31/2022: Orthopedic Surgery by Devi Flowers MD  1) ORIF of intraarticular left distal femur fracture using a [employer] 12-hole distal femoral periarticular plate and the combination of locking and non-locking screw. ?    DISCHARGE MEDICATIONS:  Current Discharge Medication List             Details   enoxaparin Sodium (LOVENOX) 30 MG/0.3ML injection Inject 0.3 mLs into the skin 2 times daily for 28 days  Qty: 16.8 mL, Refills: 0      methocarbamol (ROBAXIN) 500 MG tablet Take 1 tablet by mouth in the morning and 1 tablet at noon and 1 tablet in the evening and 1 tablet before bedtime. Do all this for 10 days. Qty: 40 tablet, Refills: 0      oxyCODONE (ROXICODONE) 5 MG immediate release tablet Take 1 tablet by mouth every 6 hours as needed (severe pain) for up to 7 days. Qty: 28 tablet, Refills: 0    Comments: Reduce doses taken as pain becomes manageable  Associated Diagnoses: Closed disp comminuted fracture of shaft of right femur with malunion; Acute pain due to trauma; Post-op pain      sennosides-docusate sodium (SENOKOT-S) 8.6-50 MG tablet Take 1 tablet by mouth in the morning and at bedtime for 14 days  Qty: 28 tablet, Refills: 0      acetaminophen (TYLENOL) 325 MG tablet Take 2 tablets by mouth in the morning and 2 tablets at noon and 2 tablets in the evening and 2 tablets before bedtime. Do all this for 14 days.   Qty: 112 tablet, Refills: 0                Details   !! insulin glargine (LANTUS) 100 UNIT/ML injection vial Inject 17 Units into the skin daily  Qty: 1 each, Refills: 0       !! - Potential duplicate medications found. Please discuss with provider. Details   donepezil (ARICEPT) 5 MG tablet Take 5 mg by mouth nightly      !! insulin glargine (LANTUS) 100 UNIT/ML injection vial Inject 6 Units into the skin nightly      insulin lispro (HUMALOG) 100 UNIT/ML injection vial Inject into the skin 3 times daily (before meals)      mirtazapine (REMERON) 15 MG tablet Take 7.5 mg by mouth nightly      memantine (NAMENDA) 5 MG tablet Take 5 mg by mouth in the morning and at bedtime      Magnesium Oxide (MAGNESIUM-OXIDE) 250 MG TABS tablet Take 250 mg by mouth in the morning and at bedtime      carvedilol (COREG) 6.25 MG tablet Take 1 tablet by mouth 2 times daily  Qty: 60 tablet, Refills: 3      isosorbide mononitrate (IMDUR) 30 MG extended release tablet Take 1 tablet by mouth daily  Qty: 30 tablet, Refills: 3      aspirin 81 MG EC tablet Take 1 tablet by mouth daily  Qty: 30 tablet, Refills: 3      SITagliptin (JANUVIA) 100 MG tablet Take 25 mg by mouth daily Take half a tablet daily  Refills: 0       !! - Potential duplicate medications found. Please discuss with provider.             Current Facility-Administered Medications:     oxyCODONE (ROXICODONE) immediate release tablet 5 mg, 5 mg, Oral, Q4H PRN, 5 mg at 08/30/22 1357 **OR** oxyCODONE (ROXICODONE) immediate release tablet 10 mg, 10 mg, Oral, Q4H PRN, Ariadna Preciado PA-C    sodium chloride flush 0.9 % injection 5-40 mL, 5-40 mL, IntraVENous, 2 times per day, Sergio Chu MD, 10 mL at 08/30/22 2102    sodium chloride flush 0.9 % injection 5-40 mL, 5-40 mL, IntraVENous, PRN, Sergio Chu MD    0.9 % sodium chloride infusion, , IntraVENous, PRN, Sergio Chu MD    acetaminophen (TYLENOL) tablet 650 mg, 650 mg, Oral, Q4H PRN, Sergio Chu MD    oxyCODONE (ROXICODONE) immediate release tablet 5 mg, 5 mg, Oral, Q4H PRN, Sergio Chu MD    morphine (PF) dextrose bolus 10% 125 mL, 125 mL, IntraVENous, PRN **OR** dextrose bolus 10% 250 mL, 250 mL, IntraVENous, PRN, MARK Stephens-LYNDA    glucagon (rDNA) injection 1 mg, 1 mg, SubCUTAneous, PRN, Wilda Burnett PA-C    dextrose 10 % infusion, , IntraVENous, Continuous PRN, Wilda Burnett PA-C    methocarbamol (ROBAXIN) tablet 500 mg, 500 mg, Oral, Q6H, MARK Stephens-C, 500 mg at 08/31/22 0524    insulin lispro (HUMALOG) injection vial 0-8 Units, 0-8 Units, SubCUTAneous, TID WC, MARK Stephens-C, 4 Units at 08/31/22 0845    insulin lispro (HUMALOG) injection vial 0-4 Units, 0-4 Units, SubCUTAneous, Nightly, Wilda Burnett PA-C, 4 Units at 08/29/22 2049    melatonin disintegrating tablet 5 mg, 5 mg, Oral, Nightly, MARK Stephens-C, 5 mg at 08/30/22 2102    sennosides-docusate sodium (SENOKOT-S) 8.6-50 MG tablet 1 tablet, 1 tablet, Oral, BID, MARK Stephens-C, 1 tablet at 08/31/22 0844    polyethylene glycol (GLYCOLAX) packet 17 g, 17 g, Oral, Daily PRN, Wilda Burnett PA-C     REASON FOR HOSPITALIZATION:  Natalie Larry is a 80 y.o. female with a PMHx of HTN, dementia, HLD, T2DM presents as a CAT2 trauma s/p echanical fall from standing on 8/29/22 (-) head strike, (-) LOC, (-) AC/AP. Admitted to trauma and Ortho on as consult. Trauma workup found acute comminuted intra-articular left distal femur fracture. SIGNIFICANT FINDINGS:  Catalog of Injuries:   S/p fall from standing on 8/30  Acute comminuted intra-articular left distal femur fracture  Acute pain due to trauma     PMHx: HTN, dementia, HLD, T2DM     Incidental Findings: None, JLR 8/30/22       HOSPITAL COURSE:  8/29/2022: FFS at nursing home. Admitted to [facility]. 8/30/2022: OR with Ortho for ORIF of left femur fracture. 8/31/2022: Post op abs stable. VSS stable. PT/OT eval and recs for SNF. Medically cleared for discharge to [facility] today.   At time of discharge, Anushka Chavis was tolerating a regular diet, having bowel movements, and had adequate analgesia on oral pain medications. Pt's activity level was NWB RLE and in KI at all times, no ROM of knee. The patient had no signs or symptoms of complications. Patient was determined stable for discharge to: [address]    The patient was seen and examined on the day of discharge with the following findings:  Constitutional: Lying supine in bed. More comfortable appearing than yesterday. HEENT: Atraumatic normocephalic. Hard of hearing. Cardiovascular: Regular rate and rhythm. Pulmonary: Clear to ausculation bilaterally on room air. No wheezing, rhonchi or rales. Abdominal: Soft. Non-distended. Non-tender. Musculoskeletal: LLE with slight contracture of left knee. Post op dressings in place, c/d/I. KI in place. Neurological: Alert, awake, and orientated x 3. Motor and sensory grossly intact. No focal deficits. GCS of 15. ANTICIPATED FOLLOW UP:  No future appointments. No discharge procedures on file. Other indicated follow up and instructions for scheduling:  - Follow up with Ronald Lugo in 2 weeks. - No trauma follow up needed. VTE RISK AT DISCHARGE:  Per trauma program protocol, the patient DOES REQUIRE post-discharge VTE prophylaxis due to: NWB single LE or BLE fractures limiting mobility. Patient will require Lovenox for 4 weeks until 9/28/22. --  Ksenia Patten PA-C  Trauma/Critical Care  Emergency General Surgery    [see treatment team sticky note for contact information]      >30 minutes were spent on the discharge of this patient including final examination of the patient, discussion of the hospital stay, instructions for continuing care to all relevant caregivers, preparation of discharge records, prescriptions and referral forms, and clear identification of reasons to return to clinic or to emergency room.

## 2022-08-31 NOTE — PLAN OF CARE
See OT evaluation for all goals and OT POC.  Electronically signed by HERMELINDA Carreon on 8/31/2022 at 10:18 AM

## 2022-08-31 NOTE — PROGRESS NOTES
[facility] OCCUPATIONAL THERAPY EVALUATION - ACUTE     NAME: Obdulia Florentino  : 1928 (80 y.o.)  MRN: [id number]  CODE STATUS: DNR-CCA  Room: U615/D072-13    Date of Service: 2022    Patient Diagnosis(es): Closed disp comminuted fracture of shaft of right femur with malunion [S72.351P]  Other closed fracture of distal end of left femur, initial encounter [facility]) [S72.492A]   Patient Active Problem List    Diagnosis Date Noted    Closed disp comminuted fracture of shaft of right femur with malunion 2022    Hyperglycemia 2021    Type 2 diabetes mellitus with hyperosmolar hyperglycemic state (HHS) ([address] 2021    Closed nondisplaced transverse fracture of right patella with routine healing 2021    Closed fracture of right distal radius and ulna     Closed nondisp transvrs fracture of right patella with routine healing 2021    Diabetes mellitus ([address]     Pacemaker     Cardiomyopathy ([address]     Ischemic cardiomyopathy 2018    CAD (coronary artery disease) 2018    Hypertension 2018    Pure hypercholesterolemia 2018        Past Medical History:   Diagnosis Date    Anemia     Atrial fibrillation (HCC)     CAD (coronary artery disease)     Cardiomyopathy ([address]     CHF (congestive heart failure) ([address]     Diabetes mellitus ([address]     Hypertension      Past Surgical History:   Procedure Laterality Date    APPENDECTOMY      CORONARY ANGIOPLASTY WITH STENT PLACEMENT      FOREARM SURGERY Right 2021    CLOSED  REDUCTION INTERNAL FIXATION RIGHT  DISTAL RADIUS PINNING performed by Lanie Sousa MD at [address] Left 2012    PACEMAKER INSERTION      TONSILLECTOMY      WRIST FRACTURE SURGERY Left 2012        Restrictions  Restrictions/Precautions: Weight Bearing, Fall Risk      Left Lower Extremity Weight Bearing: Non Weight Bearing       Safety Devices: Safety Devices  Type of Devices:  All fall risk precautions in place;Call light within reach; Bed alarm in place; Left in bed;Nurse notified     Patient's date of birth confirmed: Yes    General:  Chart Reviewed: Yes  Patient assessed for rehabilitation services?: Yes    Subjective  Subjective: \"I'm fine.'       Pain at start of treatment: No    Pain at end of treatment: No    Location: L leg- pt reports pain with movement but does not rate, states she is comfortable following evaluation  Nursing notified: Declined  RN:   Intervention: Repositioned    Prior Level of Function:  Social/Functional History  Additional Comments: Attempted to gather PLOF however pt. becomes agitated with questions d/t [place] and was unable to attend to read written questions. Unable to gather full PLOF. OBJECTIVE:     Orientation Status:  Orientation  Overall Orientation Status: Impaired  Orientation Level: Oriented to person;Oriented to place;Oriented to situation (Situational orientation with cues)    Observation:  Observation/Palpation  Posture: Poor  Observation: Pt with significant kyphosis and L lean noted in bed; even on sitting up pt. demonstrates L lean and inability to hold head up. Knee immobilizer adjusted for fit on LLE. Cognition Status:  Cognition  Overall Cognitive Status: Exceptions  Arousal/Alertness: Delayed responses to stimuli  Following Commands: Inconsistently follows commands  Attention Span: Difficulty attending to directions; Difficulty dividing attention  Memory: Decreased long term memory;Decreased recall of precautions (Difficulty assessing for accuracy d/t significant [place])  Safety Judgement: Decreased awareness of need for assistance;Decreased awareness of need for safety  Problem Solving: Assistance required to generate solutions;Assistance required to identify errors made;Assistance required to correct errors made;Assistance required to implement solutions  Insights: Decreased awareness of deficits  Initiation: Requires cues for all  Sequencing: Requires cues for all    Perception Status:  Perception  Overall Perceptual Status: WFL    Vision and Hearing Status:  Hearing  Hearing: Exceptions to [facility]  Hearing Exceptions: Hard of hearing/hearing concerns;Bilateral hearing aid (Very [place] even with B HA)   Vision - Basic Assessment  Prior Vision:  (Pt unable to state)    GROSS ASSESSMENT AROM/PROM:  AROM:  (RUE WFL with arthritic changes, minimal movement noted LUE)  PROM:  (Unable to range L UE d/t pt. unable to tolerate sitting off of L side long enought o attempt)    ROM:   LUE AROM (degrees)  LUE General AROM: Demonstrates some active shoulder movement but no active elbow extension  Left Hand PROM (degrees)  Left Hand General PROM: Unable to position patient to assess  Left Hand AROM (degrees)  Left Hand AROM: WFL  Left Hand General AROM: Very slow movement  RUE AROM (degrees)  RUE AROM : WFL  RUE General AROM: Arthritic changes  Right Hand AROM (degrees)  Right Hand AROM: WFL    UE STRENGTH:  Strength: Grossly decreased, non-functional    UE COORDINATION:  Coordination: Grossly decreased, non-functional    UE TONE:  Tone: Normal (Possibly increased tone at L elbow)    UE SENSATION:  Sensation: Intact    Hand Dominance:  Hand Dominance  Hand Dominance: Right (Nods yes when asked if she is R handed)    ADL Status:  ADL  Feeding: Dependent/Total  Grooming: Dependent/Total  UE Bathing: Dependent/Total  LE Bathing: Dependent/Total  UE Dressing: Dependent/Total  LE Dressing: Dependent/Total  Toileting: Dependent/Total  Additional Comments: Simulated ADLs as above.  Pt. limited d/t pain and decreased cognition as well as significant L lean    Functional Mobility:    Transfers  Sit to stand: Unable to assess  Stand to sit: Unable to assess    Patient ambulated NT due to Unsafe to attempt standing this AM      Bed Mobility  Bed mobility  Rolling to Left: Maximum assistance  Rolling to Right: Maximum assistance  Supine to Sit: Dependent/Total;2 Person assistance  Sit to Supine: Dependent/Total;2 Person assistance    Seated and Standing Balance:  Balance  Sitting: Impaired  Sitting - Static: Poor (constant support)  Sitting - Dynamic: Poor (constant support)    Functional Endurance:  Activity Tolerance  Activity Tolerance: Patient limited by fatigue;Treatment limited secondary to decreased cognition;Patient limited by pain    D/C Recommendations:  OT D/C RECOMMENDATIONS  REQUIRES OT FOLLOW-UP: Yes    Equipment Recommendations:  OT Equipment Recommendations  Other: Continue to assess    OT Education:   Patient Education  Education Given To: Patient  Education Provided: Plan of Care;Role of Therapy  Education Method: Verbal  Barriers to Learning: None  Education Outcome: Verbalized understanding;Continued education needed    OT Follow Up:   OT D/C RECOMMENDATIONS  REQUIRES OT FOLLOW-UP: Yes       Assessment/Discharge Disposition:  Assessment: Pt is a 80year old woman who [facility]. Pt demonstrates significant limitations in her mobility and sequencing and would benefit from continued OT to maximize independence and safety with ADL Tasks.   Performance deficits / Impairments: Decreased functional mobility , Decreased ADL status, Decreased strength, Decreased endurance, Decreased balance, Decreased high-level IADLs, Decreased fine motor control, Decreased coordination, Decreased cognition, Decreased safe awareness  Prognosis: Fair  Discharge Recommendations: Continue to assess pending progress  Decision Making: Medium Complexity  History: Pt's medical history is moderately complex  Exam: Pt has 10 performance deficits  Assistance / Modification: Pt. requires max A    AMPAC (Six Click) Self care Score   How much help for putting on and taking off regular lower body clothing?: Total  How much help for Bathing?: Total  How much help for Toileting?: Total  How much help for putting on and taking off regular upper body clothing?: Total  How much help for taking care of personal grooming?: Total  How much help for eating meals?: Total  AM-PAC Inpatient Daily Activity Raw Score: 6  AM-PAC Inpatient ADL T-Scale Score : 17.07  ADL Inpatient CMS 0-100% Score: 100    Therapy key for assistance levels -   Independent/Mod I = Pt. is able to perform task with no assistance but may require a device   Stand by assistance = Pt. does not perform task at an independent level but does not need physical assistance, requires verbal cues  Minimal, Moderate, Maximal Assistance = Pt. requires physical assistance (25%, 50%, 75% assist from helper) for task but is able to actively participate in task   Dependent = Pt. requires total assistance with task and is not able to actively participate with task completion     Plan:  Plan  Times per Week: 1-4x  Plan Weeks: Length of acute stay  Current Treatment Recommendations: Strengthening, Balance training, Functional mobility training, Endurance training, Safety education & training, Pain management, Patient/Caregiver education & training, Equipment evaluation, education, & procurement, Self-Care / ADL    Goals:   Patient will:    - Improve functional endurance to tolerate/complete 30 mins of ADL's  - Be Min  in UB ADLs   - Be Max A in LB ADLs  - Be Mod A in ADL transfers without LOB  - Be Max A in toileting tasks  - Improve R UE strength and endurance to 4/5 in order to participate in self-care activities as projected. - Access appropriate D/C site with as few architectural barriers as possible. - Sequence self-care tasks with no verbal cues for safety    Patient Goal: Patient goals : \"I want to get sleep\"     Discussed and agreed upon: Yes Comments:       Therapy Time:   Individual   Time In 0855   Time Out 0910   Minutes 15     Eval: 15 minutes     Electronically signed by:     HERMELINDA Drake,   8/31/2022, 10:15 AM

## 2022-08-31 NOTE — PROGRESS NOTES
Physical Therapy Med Surg Initial Assessment  Fac[facility]/Department: [facility]  [facility]     NAME: Ria Norris  : 1928 (89 y.o.)  MRN: [id number]  CODE STATUS: DNR-CCA    Date of Service: 2022    Patient Diagnosis(es): Closed disp comminuted fracture of shaft of left femur with malunion [S72.351P]  Other closed fracture of distal end of left femur, initial encounter [facility]) [S72.492A]   Chief Complaint   Patient presents with    Fall     Pt to the ED via EMS from [facility] with left hip pain after fall at home. Pt is alert but very [place]. Pt unable to straighten left leg and grabbing at left hip. Pt fell in SNF by tripping over a piece of furniture.       Patient Active Problem List    Diagnosis Date Noted    Post-op pain 2022    Acute pain due to trauma 2022    Hyperglycemia 2021    Type 2 diabetes mellitus with hyperosmolar hyperglycemic state (HHS) ([address] 2021    Closed nondisplaced transverse fracture of right patella with routine healing 2021    Closed fracture of right distal radius and ulna     Closed nondisp transvrs fracture of right patella with routine healing 2021    Diabetes mellitus ([address]     Pacemaker     Cardiomyopathy ([address]     Ischemic cardiomyopathy 2018    CAD (coronary artery disease) 2018    Hypertension 2018    Pure hypercholesterolemia 2018      Past Medical History:   Diagnosis Date    Anemia     Atrial fibrillation (HCC)     CAD (coronary artery disease)     Cardiomyopathy ([address]     CHF (congestive heart failure) ([address]     Diabetes mellitus ([address]     Hypertension      Past Surgical History:   Procedure Laterality Date    APPENDECTOMY      CORONARY ANGIOPLASTY WITH STENT PLACEMENT      FEMUR FRACTURE SURGERY Left 2022    OPEN REDUCTION INTERNAL FIXATION LEFT DISTAL FEMORAL FRACTURE performed by Roland Woo MD at [place] [address] Right 2021    CLOSED  REDUCTION INTERNAL FIXATION RIGHT  DISTAL RADIUS PINNING performed by Amirah Vazquez MD at [address] Left 2012    PACEMAKER INSERTION      TONSILLECTOMY      WRIST FRACTURE SURGERY Left 2012       Chief Reason: General Medical  Patient assessed for rehabilitation services?: Yes    Restrictions:  Restrictions/Precautions: Weight Bearing, Fall Risk  Lower Extremity Weight Bearing Restrictions  Left Lower Extremity Weight Bearing: Non Weight Bearing  Required Braces or Orthoses  Left Lower Extremity Brace: Knee Immobilizer (at all times except for hygiene- no knee ROM)     SUBJECTIVE:    Pt very hard of hearing despite hearing aides. Pt reports L leg pain but unable to rate or describe d/t confusion    Pain  Pre treatment screenin Faces 1-10  []  Pt declined intervention  [x]  Pt able to proceed PT session  [x]  RN or physician notified  []  RN called to bedside to administer meds. Post treatment screening 5/10 faces during mobility, looks comfortable at rest, appears to be falling asleep once positioned. Prior Level of Function:  Social/Functional History  Type of Home: Facility  Home Layout: One level  Home Access: Level entry  Ambulation Assistance:  (reports that pt was amb when she fell, unable to state if using AD)  Additional Comments: Attempted to gather PLOF however pt. becomes agitated with questions d/t [place] and was unable to attend to read written questions. Unable to gather full PLOF.     OBJECTIVE:   Vision  Vision:  (Appears WFL, did not state if she wears glasses)  Hearing: Exceptions to [facility]  Hearing Exceptions: Hard of hearing/hearing concerns;Bilateral hearing aid (Very [place] even with B HA)    Cognition:  Overall Orientation Status: Impaired  Orientation Level: Oriented to person, Oriented to place, Oriented to situation (Situational orientation with cues)  Overall Cognitive Status: Exceptions    Observation/Palpation  Posture: Poor  Observation: Pt with significant kyphosis and L lean noted in bed; even on sitting up pt. demonstrates L lean and inability to hold head up. Knee immobilizer adjusted for fit on LLE.    ROM:  AROM: Grossly decreased, non-functional (pt unable to follow commands for R LE AROM; L knee NT per ortho protocol, in knee immobilizer)  PROM:  (except L knee NT per ortho protocol, in knee immobilizer)    Strength:  Strength: Grossly decreased, non-functional    Neuro:  Balance  Sitting - Static: Poor  Sitting - Dynamic: Poor  Comments: Dep progressing to Max A unsupported at EOB with L LE supported and in knee immobilizer; heavy L Lean                    Coordination: Grossly decreased, non-functional    Sensation:  (unable to assess)    Bed mobility  Rolling to Left: Maximum assistance  Rolling to Right: Maximum assistance  Supine to Sit: Dependent/Total;2 Person assistance  Sit to Supine: Dependent/Total;2 Person assistance  Bed Mobility Comments: janette used to pivot pt to EOB    Transfers  Sit to Stand: Unable to assess  Stand to sit: Unable to assess    Ambulation  Assistance: Unable to assess      Activity Tolerance  Activity Tolerance: Patient tolerated evaluation without incident      Patient Education  Education Given To: Patient  Education Provided: Plan of Care;Role of Therapy  Education Method: Verbal  Barriers to Learning: Cognition; Hearing  Education Outcome: Unable to demonstrate understanding; Unable to verbalize;Continued education needed       ASSESSMENT:   Body Structures, Functions, Activity Limitations Requiring Skilled Therapeutic Intervention: Decreased functional mobility ; Decreased ROM; Decreased strength;Decreased body mechanics; Decreased balance;Decreased coordination; Increased pain;Decreased posture  Decision Making: High Complexity  History: high  Exam: high  Clinical Presentation: high    Therapy Prognosis: Fair  Barriers to Learning: hard of hearing, cognitive deficits         DISCHARGE RECOMMENDATIONS:  Discharge Recommendations: Patient person for activity completion. Device may be needed.   Stand by assistance = pt requires verbal cues or instructions from another person, close to but not touching, to perform the activity  Minimal assistance= pt performs 75% or more of the activity; assistance is required to complete the activity  Moderate assistance= pt performs 50% of the activity; assistance is required to complete the activity  Maximal assistance = pt performs 25% of the activity; assistance is required to complete the activity  Dependent = pt requires total physical assistance to accomplish the task

## 2022-08-31 NOTE — OP NOTE
[address]                      [address], [address] [facility]                                OPERATIVE REPORT    PATIENT NAME: Amrita Cardona                     :        1928  MED REC NO:   [id number]                            ROOM:       W279  ACCOUNT NO:   [de-identified]                           ADMIT DATE: 2022  PROVIDER:     Jeny Boothe MD    DATE OF PROCEDURE:  2022    LOCATION:  [facility]. PREOPERATIVE DIAGNOSIS:  Comminuted intraarticular left distal femur  fracture. POSTOPERATIVE DIAGNOSIS:  Comminuted intraarticular left distal femur  fracture. PROCEDURES PERFORMED:  ORIF of intraarticular left distal femur fracture  using a Synthes 12-hole distal femoral periarticular plate and the  combination of locking and non-locking screw. SURGEON:  Jeny Boothe MD    ASSISTANT:  Rinku Ocasio PA-C, was present throughout the entire case. Given the nature of the disease process and the procedure, a skilled  surgical first assistant was necessary during the case. The assistant  was necessary to hold retractors and manipulate the extremity during the  procedure. A certified scrub tech was at the back table managing the  instruments and supplies for the surgical case. ANESTHESIA:  General endotracheal plus block. COMPLICATIONS:  None. ESTIMATED BLOOD LOSS:  Minimal.    INDICATIONS:  The patient is a 79-year-old female who fell injuring her  left leg. She was found to have a comminuted distal femur  intraarticular fracture. Risks and benefits of operative stabilization  were discussed at length with the patient and family. We discussed  multiple options including intramedullary nail fixation, plate fixation,  and distal femoral replacement. Understanding these options and  limitations, she elected to proceed with ORIF. Informed consent was  obtained prior to arrival in the operating room.     OPERATIVE PROCEDURE:  Upon arrival, the patient was identified. She was  transported from a stretcher to the operating table and placed in supine  position. A block and general endotracheal anesthetic were administered  by the Anesthesia staff. Prior to starting the case, 2 gm Ancef were  given intravenously. She was positioned with her right side down in the  lateral decubitus position. All bony prominences were adequately  padded. Her left hip and leg were prepped and draped in the usual  sterile fashion. Standard lateral approach to the left femur was  utilized. The incision was carried through the subcutaneous. Hemostasis was obtained. Vastus fascia was incised to allow direct  access to the femur. Fracture ends were easily identified and cleaned  of all debris. We were unable to anatomically align the more proximal  fracture fragments. With the fracture held reduced interfragmentary  screw was placed with good purchase noted. There was more comminution  distally around metaphysis, however, the articular surface appeared to  be well aligned. With the fracture relatively reduced, the Synthes  12-hole periarticular distal femoral plate was placed on top of the  bone. A combination of locking and non-locking screws was utilized with  good compression and alignment noted. We were able to run the plate  proximally above the level of the previous trochanteric fixation nail  and placed unicortical screws to minimize stress riser. Final x-rays  confirm proper hardware alignment and hardware placement in all planes. Wounds irrigated with sterile saline. Fascia was repaired with 0 Vicryl  suture. Subcutaneous tissues were closed with 2-0 Vicryl and the skin  approximated with staples. All sponge and needle counts were correct  prior to closure. Sterile dressing was applied. She was placed in a  knee immobilizer and awoken from anesthetic.   She has taken to the  recovery room in stable condition.         Emily Yip MD    D: 08/30/2022 17:28:17       T: 08/30/2022 17:31:19     [email or web address]  Job#: [id number]     Doc#: [id number]    CC:

## 2022-08-31 NOTE — PLAN OF CARE
Problem: Pain  Goal: Verbalizes/displays adequate comfort level or baseline comfort level  8/31/2022 1022 by Nereida Del Cid RN  Outcome: Progressing  8/31/2022 0157 by Angi Wood RN  Outcome: Progressing     Problem: Skin/Tissue Integrity  Goal: Absence of new skin breakdown  Description: 1. Monitor for areas of redness and/or skin breakdown  2. Assess vascular access sites hourly  3. Every 4-6 hours minimum:  Change oxygen saturation probe site  4. Every 4-6 hours:  If on nasal continuous positive airway pressure, respiratory therapy assess nares and determine need for appliance change or resting period. 8/31/2022 1022 by Nereida Del Cid RN  Outcome: Progressing  8/31/2022 0157 by Angi Wood RN  Outcome: Progressing     Problem: Safety - Adult  Goal: Free from fall injury  8/31/2022 1022 by Nereida Del Cid RN  Outcome: Progressing  8/31/2022 0157 by Angi Wood RN  Outcome: Progressing     Problem: ABCDS Injury Assessment  Goal: Absence of physical injury  8/31/2022 1022 by Nereida Del Cid RN  Outcome: Progressing  8/31/2022 0157 by Angi Wood RN  Outcome: Progressing     Problem: Chronic Conditions and Co-morbidities  Goal: Patient's chronic conditions and co-morbidity symptoms are monitored and maintained or improved  8/31/2022 1022 by Nereida Del Cid RN  Outcome: Progressing  8/31/2022 0157 by Angi Wood RN  Outcome: Progressing     Problem: Discharge Planning  Goal: Discharge to home or other facility with appropriate resources  8/31/2022 1022 by Nereida Del Cid RN  Outcome: Progressing  8/31/2022 0157 by Angi Wood RN  Outcome: Progressing     Problem: Occupational Therapy - Adult  Goal: By Discharge: Performs self-care activities at highest level of function for planned discharge setting. See evaluation for individualized goals.   8/31/2022 1018 by DHARA Davila/L  Outcome: Progressing

## 2022-08-31 NOTE — PROGRESS NOTES
TRAUMA DAILY PROGRESS NOTE      Patient Name: Bacilio Hurtado  Admission Date 2022    Hospital Day: 2  Patient seen and examined on 2022    INTERVAL HISTORY/EVENTS     Background:  Bacilio Hurtado is a 80 y.o. female with a PMHx of HTN, dementia, HLD, T2DM presents as a CAT2 trauma s/p echanical fall from standing on 22 (-) head strike, (-) LOC, (-) AC/AP. Admitted to trauma and Ortho on as consult. Trauma workup found acute comminuted intra-articular left distal femur fracture. Hospital Course:  2022: FFS at nursing home. Admitted to [facility]. 2022: OR with Ortho for ORIF of left femur fracture. 24 Hour Events:  Initially planned for discharge this afternoon. Daughter at bedside with concerns for AMS per nursing call. Will hold discharge for tomorrow pending mental status. PHYSICAL EXAM     Vitals Average, Min and Max for last 24 hours:  Temp: Temp: 97.2 °F (36.2 °C); Temp  Av.7 °F (36.5 °C)  Min: 96.7 °F (35.9 °C)  Max: 98.8 °F (37.1 °C)  Respirations: Resp  Avg: 15.9  Min: 13  Max: 20  Pulse: Pulse  Av.8  Min: 71  Max: 99  Blood Pressure: Systolic (68UBR), PX , Min:83 , FTS:988   ; Diastolic (11KIJ), AAH:39, Min:37, Max:79  SpO2: SpO2  Av.1 %  Min: 89 %  Max: 100 %    24hr Intake/Output:   Intake/Output Summary (Last 24 hours) at 2022 1556  Last data filed at 2022 1954  Gross per 24 hour   Intake 1158.15 ml   Output --   Net 1158.15 ml       Vitals: BP (!) 89/43   Pulse 96   Temp 97.2 °F (36.2 °C) (Oral)   Resp 18   Ht 5' 3\" (1.6 m)   Wt 110 lb (49.9 kg)   SpO2 (!) 89%   BMI 19.49 kg/m²     Physical Exam from morning rounds:  Constitutional: Lying supine in bed. More comfortable appearing than yesterday. HEENT: Atraumatic normocephalic. Hard of hearing. Cardiovascular: Regular rate and rhythm. Pulmonary: Clear to ausculation bilaterally on room air. No wheezing, rhonchi or rales. Abdominal: Soft. Non-distended. Non-tender. Musculoskeletal: LLE with slight contracture of left knee. Post op dressings in place, c/d/I. KI in place. Neurological: Alert, awake, and orientated x 3. Motor and sensory grossly intact. No focal deficits. GCS of 15.     LABORATORY RESULTS (LAST 24 HOURS)     CBC with Differential:    Lab Results   Component Value Date/Time    WBC 8.2 08/31/2022 05:20 AM    RBC 2.56 08/31/2022 05:20 AM    HGB 7.3 08/31/2022 05:20 AM    HCT 22.5 08/31/2022 05:20 AM     08/31/2022 05:20 AM    MCV 87.8 08/31/2022 05:20 AM    MCH 28.7 08/31/2022 05:20 AM    MCHC 32.7 08/31/2022 05:20 AM    RDW 14.7 08/31/2022 05:20 AM    BANDSPCT 2 05/15/2022 03:45 PM    LYMPHOPCT 11.0 08/30/2022 11:58 PM    MONOPCT 7.2 08/30/2022 11:58 PM    BASOPCT 0.2 08/30/2022 11:58 PM    MONOSABS 0.6 08/30/2022 11:58 PM    LYMPHSABS 0.9 08/30/2022 11:58 PM    EOSABS 0.0 08/30/2022 11:58 PM    BASOSABS 0.0 08/30/2022 11:58 PM     CMP:    Lab Results   Component Value Date/Time     08/31/2022 05:20 AM    K 5.1 08/31/2022 05:20 AM    K 3.8 08/18/2021 06:08 AM     08/31/2022 05:20 AM    CO2 21 08/31/2022 05:20 AM    BUN 34 08/31/2022 05:20 AM    CREATININE 1.61 08/31/2022 05:20 AM    GFRAA 36.0 08/31/2022 05:20 AM    LABGLOM 29.8 08/31/2022 05:20 AM    GLUCOSE 299 08/31/2022 05:20 AM    GLUCOSE 74 06/08/2021 05:51 AM    PROT 7.0 08/29/2022 09:30 AM    LABALBU 3.7 08/29/2022 09:30 AM    CALCIUM 8.4 08/31/2022 05:20 AM    BILITOT <0.2 08/29/2022 09:30 AM    ALKPHOS 208 08/29/2022 09:30 AM    AST 26 08/29/2022 09:30 AM    ALT 12 08/29/2022 09:30 AM     Magnesium:    Lab Results   Component Value Date/Time    MG 2.1 08/31/2022 05:20 AM     PT/INR:    Lab Results   Component Value Date/Time    PROTIME 13.2 08/29/2022 09:30 AM    INR 1.0 08/29/2022 09:30 AM     PTT:    Lab Results   Component Value Date/Time    APTT 31.3 08/29/2022 09:30 AM       IMAGING RESULTS (PERSONALLY REVIEWED)     All admission and follow up imaging reviewed. ASSESSMENT & PLAN     Diagnoses:  S/p fall from standing on 8/30  Acute comminuted intra-articular left distal femur fracture  Acute pain due to trauma    PMHx: HTN, dementia, HLD, T2DM    Incidental Findings: None, JLR 8/30/22      ASSESSMENT/PLAN:  Neurological: Acute pain due to trauma, Hx dementia   - Continue Oxycodone 2.5/5mg q4 PRN mod/severe pain  - Continue Robaxin 500mg q6   - Continue home donepezil 5mg daily, memantine 5mg BID, mirtazapine 7.5mg HS  - Continue Melatonin 5mg HS    Cardiovascular: Hx HTN, afib, HLD  - Continue vital signs per unit protocol  - Continue home ASA, carvedilol 6.25mg BID, imdur 30mg daily     Respiratory: No acute concerns  - Maintain O2 sats > 92%, encourage IS. GI/Diet: No acute concerns  - Regular diet 4 carb choices. NPO at midnight  - Bowel regimen: Senokot BID, miralax daily PRN     Renal/Electrolytes: No electrolyte abnormalities. BUN/Cr elevated at 27/1.32-> 36/1.99-> 34/1. 61. Appears to be patient's baseline  - HLIV  - Continue home mag oxide 40mg BID  - AM BMP     ID: No leukocytosis, afebrile  - No indication for abx     Heme: No acute concerns  - No indication for transfusion  - AM CBC     Endocrine: Hx T2DM  - Continue home lantus 17 Units qAM, lantus 6 units HS  - ISS AC/HS  - Glucose check AC/HS     MSK: Left distal femur fracture s/p ORIF of left femur fracture. - Spines: Clear  - Weight Bearing Restrictions: NWB LLE with KI in place at all times. - Activity: as tolerated  - PT/OT: Will consult post-op     Prophylaxis:   - SCDs, heparin 5000units BID due to kidney function  - No indication for GI PPx    Dispo: Work on placement post-op  Accom Status: General Status      Follow up with Ortho TBD. Please call for any questions or concerns.   Raul Little Treatment Team Sticky Note for contact information]      Altaf Enamorado PA-C  Trauma/Critical Care  [see Treatment Team Sticky Note for contact information]     This patient's plan of care was discussed and made in collaboration with Trauma Attending physician, Tricia Echevarria MD.

## 2022-08-31 NOTE — DISCHARGE INSTRUCTIONS
Discharge Instructions    To prevent Clot formation, you have been placed on the following medication:  Lovenox 30 mg subcu twice a day started on August 31, 2022  Surgical Site Care:  Dressing change every days and PRN (as needed) with 4 x 4 and porous tape. No betadine. If glue is present, leave open to air  If Aquacel Ag (rubber) dressing is present, do not remove dressing for 7 days, unless heavily saturated. If heavily saturated, remove dressing and start daily dressing changes as described above   if Staples  will be removed on post-operative day 14 and steri-strips applied  Showering is permitted starting POD1 if waterproof aquacell dressing is present or when incision is covered with waterproof dressing, such as 4 x 4 and tegaderm  Physical Therapy:  Weight Bearing Status:  Non weight bearing to operative extremity, in knee immobilizer at all times with exception of skin care and hygiene. NO ROM to right knee. Hip Precautions  Per Physical Therapy handout, no hip precautions  Pain Medications  You were given per primary team   Wean off pain medications as you deem appropriate as long as pain is under control  Cold packs/Ice packs/Machine  May be used 3 times daily for 15-30 minutes as necessary  Be sure to have a barrier (cloth, clothing, towel) between the site and the ice pack to prevent [address] for Orthopedics office if  Increased redness, swelling, drainage of any kind, and/or pain to surgery site. As well as new onset fevers and or chills. These could signify an infection. Calf or thigh tenderness to touch as well as increased swelling or redness. This could signify a clot formation. Numbness or tingling to an area around the incision site or below the incision site (toes). Any rash appears, increased  or new onset nausea/vomiting occur. This may indicate a reaction to a medication. Phone # [phone].   Follow up with Surgeon  I acknowledge that I have received abilio hose and understand the instructions on how and when to wear them (on during the day off at night)   Discharging RN who has gone over instructions and acknowledges abilio hose have been received

## 2022-09-01 ENCOUNTER — APPOINTMENT (OUTPATIENT)
Dept: ULTRASOUND IMAGING | Age: 87
DRG: 480 | End: 2022-09-01
Payer: MEDICARE

## 2022-09-01 ENCOUNTER — APPOINTMENT (OUTPATIENT)
Dept: CT IMAGING | Age: 87
DRG: 480 | End: 2022-09-01
Payer: MEDICARE

## 2022-09-01 PROBLEM — S72.402A CLOSED FRACTURE OF LEFT DISTAL FEMUR (HCC): Status: ACTIVE | Noted: 2022-09-01

## 2022-09-01 PROBLEM — I63.9 CEREBROVASCULAR ACCIDENT (CVA) (HCC): Status: ACTIVE | Noted: 2022-09-01

## 2022-09-01 LAB
ANION GAP SERPL CALCULATED.3IONS-SCNC: 12 MEQ/L (ref 9–15)
BASE EXCESS ARTERIAL: 0 (ref -3–3)
BLOOD BANK DISPENSE STATUS: NORMAL
BLOOD BANK PRODUCT CODE: NORMAL
BPU ID: NORMAL
BUN BLDV-MCNC: 35 MG/DL (ref 8–23)
CALCIUM IONIZED: 1.15 MMOL/L (ref 1.12–1.32)
CALCIUM SERPL-MCNC: 8.1 MG/DL (ref 8.5–9.9)
CHLORIDE BLD-SCNC: 104 MEQ/L (ref 95–107)
CO2: 24 MEQ/L (ref 20–31)
CREAT SERPL-MCNC: 1.48 MG/DL (ref 0.5–0.9)
DESCRIPTION BLOOD BANK: NORMAL
GFR AFRICAN AMERICAN: 36
GFR AFRICAN AMERICAN: 39.7
GFR NON-AFRICAN AMERICAN: 30
GFR SERPL CREATININE-BSD FRML MDRD: 32.5 ML/MIN/{1.73_M2}
GLUCOSE BLD-MCNC: 187 MG/DL (ref 70–99)
GLUCOSE BLD-MCNC: 190 MG/DL (ref 70–99)
GLUCOSE BLD-MCNC: 193 MG/DL (ref 70–99)
GLUCOSE BLD-MCNC: 207 MG/DL (ref 70–99)
GLUCOSE BLD-MCNC: 224 MG/DL (ref 70–99)
HCO3 ARTERIAL: 23.5 MMOL/L (ref 21–29)
HCT VFR BLD CALC: 21.3 % (ref 37–47)
HCT VFR BLD CALC: 24.9 % (ref 37–47)
HEMOGLOBIN: 6.9 G/DL (ref 12–16)
HEMOGLOBIN: 8.5 G/DL (ref 12–16)
HEMOGLOBIN: 8.7 GM/DL (ref 12–16)
LACTATE: 1.18 MMOL/L (ref 0.4–2)
MAGNESIUM: 2 MG/DL (ref 1.7–2.4)
MCH RBC QN AUTO: 28.6 PG (ref 27–31.3)
MCHC RBC AUTO-ENTMCNC: 32.6 % (ref 33–37)
MCV RBC AUTO: 87.6 FL (ref 82–100)
O2 SAT, ARTERIAL: 82 % (ref 93–100)
PCO2 ARTERIAL: 33 MM HG (ref 35–45)
PDW BLD-RTO: 14.7 % (ref 11.5–14.5)
PERFORMED ON: ABNORMAL
PH ARTERIAL: 7.46 (ref 7.35–7.45)
PLATELET # BLD: 123 K/UL (ref 130–400)
PO2 ARTERIAL: 43 MM HG (ref 75–108)
POC CHLORIDE: 103 MEQ/L (ref 99–110)
POC CREATININE: 1.6 MG/DL (ref 0.6–1.2)
POC HEMATOCRIT: 26 % (ref 36–48)
POC POTASSIUM: 4.2 MEQ/L (ref 3.5–5.1)
POC SAMPLE TYPE: ABNORMAL
POC SODIUM: 138 MEQ/L (ref 136–145)
POTASSIUM SERPL-SCNC: 4.1 MEQ/L (ref 3.4–4.9)
RBC # BLD: 2.43 M/UL (ref 4.2–5.4)
SODIUM BLD-SCNC: 140 MEQ/L (ref 135–144)
TCO2 ARTERIAL: 25 MMOL/L (ref 21–32)
WBC # BLD: 6.4 K/UL (ref 4.8–10.8)

## 2022-09-01 PROCEDURE — 36600 WITHDRAWAL OF ARTERIAL BLOOD: CPT

## 2022-09-01 PROCEDURE — 80048 BASIC METABOLIC PNL TOTAL CA: CPT

## 2022-09-01 PROCEDURE — 6370000000 HC RX 637 (ALT 250 FOR IP): Performed by: NURSE PRACTITIONER

## 2022-09-01 PROCEDURE — 70450 CT HEAD/BRAIN W/O DYE: CPT

## 2022-09-01 PROCEDURE — 85018 HEMOGLOBIN: CPT

## 2022-09-01 PROCEDURE — 85014 HEMATOCRIT: CPT

## 2022-09-01 PROCEDURE — 84295 ASSAY OF SERUM SODIUM: CPT

## 2022-09-01 PROCEDURE — 99233 SBSQ HOSP IP/OBS HIGH 50: CPT | Performed by: PHYSICIAN ASSISTANT

## 2022-09-01 PROCEDURE — 83605 ASSAY OF LACTIC ACID: CPT

## 2022-09-01 PROCEDURE — 6360000002 HC RX W HCPCS: Performed by: PHYSICIAN ASSISTANT

## 2022-09-01 PROCEDURE — 82565 ASSAY OF CREATININE: CPT

## 2022-09-01 PROCEDURE — 82435 ASSAY OF BLOOD CHLORIDE: CPT

## 2022-09-01 PROCEDURE — 83735 ASSAY OF MAGNESIUM: CPT

## 2022-09-01 PROCEDURE — 82803 BLOOD GASES ANY COMBINATION: CPT

## 2022-09-01 PROCEDURE — 93880 EXTRACRANIAL BILAT STUDY: CPT

## 2022-09-01 PROCEDURE — 36415 COLL VENOUS BLD VENIPUNCTURE: CPT

## 2022-09-01 PROCEDURE — 2580000003 HC RX 258: Performed by: PHYSICIAN ASSISTANT

## 2022-09-01 PROCEDURE — 2580000003 HC RX 258: Performed by: ORTHOPAEDIC SURGERY

## 2022-09-01 PROCEDURE — 1210000000 HC MED SURG R&B

## 2022-09-01 PROCEDURE — 84132 ASSAY OF SERUM POTASSIUM: CPT

## 2022-09-01 PROCEDURE — 99222 1ST HOSP IP/OBS MODERATE 55: CPT | Performed by: NURSE PRACTITIONER

## 2022-09-01 PROCEDURE — 82330 ASSAY OF CALCIUM: CPT

## 2022-09-01 PROCEDURE — 36430 TRANSFUSION BLD/BLD COMPNT: CPT

## 2022-09-01 PROCEDURE — 85027 COMPLETE CBC AUTOMATED: CPT

## 2022-09-01 RX ORDER — ASPIRIN 300 MG/1
300 SUPPOSITORY RECTAL DAILY
Status: DISCONTINUED | OUTPATIENT
Start: 2022-09-01 | End: 2022-09-04 | Stop reason: HOSPADM

## 2022-09-01 RX ORDER — SODIUM CHLORIDE 9 MG/ML
INJECTION, SOLUTION INTRAVENOUS CONTINUOUS
Status: DISCONTINUED | OUTPATIENT
Start: 2022-09-01 | End: 2022-09-02

## 2022-09-01 RX ORDER — SODIUM CHLORIDE 9 MG/ML
INJECTION, SOLUTION INTRAVENOUS PRN
Status: DISCONTINUED | OUTPATIENT
Start: 2022-09-01 | End: 2022-09-02

## 2022-09-01 RX ADMIN — Medication 10 ML: at 00:30

## 2022-09-01 RX ADMIN — HEPARIN SODIUM 5000 UNITS: 5000 INJECTION INTRAVENOUS; SUBCUTANEOUS at 05:23

## 2022-09-01 RX ADMIN — SODIUM CHLORIDE: 9 INJECTION, SOLUTION INTRAVENOUS at 22:49

## 2022-09-01 RX ADMIN — ASPIRIN 300 MG: 300 SUPPOSITORY RECTAL at 15:16

## 2022-09-01 RX ADMIN — HEPARIN SODIUM 5000 UNITS: 5000 INJECTION INTRAVENOUS; SUBCUTANEOUS at 15:16

## 2022-09-01 RX ADMIN — HEPARIN SODIUM 5000 UNITS: 5000 INJECTION INTRAVENOUS; SUBCUTANEOUS at 22:51

## 2022-09-01 ASSESSMENT — PAIN SCALES - GENERAL: PAINLEVEL_OUTOF10: 0

## 2022-09-01 ASSESSMENT — ENCOUNTER SYMPTOMS
COLOR CHANGE: 0
WHEEZING: 0
VOMITING: 0
COUGH: 0

## 2022-09-01 NOTE — FLOWSHEET NOTE
9494- Shift assessment completed at this time, pt not alert, JASON orientation, pupils are round and sluggish, JASON hand grasps or foot flexion/extension, pt has minimal movement to painful stimuli, JASON gag reflex or tongue deviation, NIHSS 23, JASON best gaze, visual, limb ataxia, sensory, or dysarthria, left sided facial paralysis, pt snoring, surgical dressing changed at this time, pedal pulse 2+, JASON sensation, Trauma PA at bedside, daughter at bedside -KG     1600- MRI formed competed with POA -KG     1630- when changing patient, blisters under immobilizer, mepilex applied to area -KG     Electronically signed by Puja Paez RN on 9/1/2022

## 2022-09-01 NOTE — CONSULTS
[facility] Neurology Consult   Note  Name: Patric Barthel  Age: 80 y.o. Gender: female  CodeStatus: DNR-CCA  Allergies: Tylenol [Acetaminophen]    Chief Complaint:Fall (Pt to the ED via EMS from [facility] with left hip pain after fall at home. Pt is alert but very [place]. Pt unable to straighten left leg and grabbing at left hip. Pt fell in SNF by tripping over a piece of furniture. )    Primary Care Provider: Garo Hull MD  InpatientTreatment Team: Treatment Team: Attending Provider: Simran Gan MD; : Kalee Swartz, RN; Registered Nurse: Sonia Stephens, RN; Respiratory Therapist (Day): Fernanda La RCP; Utilization Reviewer: Lindsey Torres RN; Consulting Physician: TRA Mclain CNP  Admission Date: 8/29/2022      HPI   Consulting provider: MARK Liang for somnolence  Pt seen and examined for neurology consult. Patient is a 42-year-old female with past medical history of hypertension, diabetes mellitus type 2, cardiomyopathy, CAD with stent, atrial fibrillation, anemia, pacemaker insertion who suffered a mechanical fall from standing resulting in acute comminuted intra-articular left distal femur fracture. No head strike reported. Patient underwent open reduction internal fixation of the left distal femur fracture on 8/30/2022. On 8/31/2022 at approximately noon patient was given Robaxin for which she spit out half of the dose. It is reported that after that she became somnolent. She is remained somnolent ever since and is very difficult to arouse. She has not had any pain medication in the last 24 hours. Nursing reports that patient has attempted to speak minimally a couple of times and speech seemed garbled. Upon exam she is laying in bed. Does not respond to verbal stimuli. Does respond to tactile stimuli by moving extremities. Pupils are reactive. Left facial droop is noted. Left arm is flaccid. Right arm does provide resistance and movement.   Is able to spontaneously move bilateral lower extremities. Stat CT of the head was obtained today and was negative for acute findings. ABGs obtained today are negative. Point-of-care glucose 187. Vitals:    09/01/22 1154   BP: (!) 119/52   Pulse: 68   Resp:    Temp: 98.2 °F (36.8 °C)   SpO2: 100%   History reviewed. No pertinent family history. Social History     Socioeconomic History    Marital status:      Spouse name: Not on file    Number of children: Not on file    Years of education: Not on file    Highest education level: Not on file   Occupational History    Not on file   Tobacco Use    Smoking status: Former    Smokeless tobacco: Never   Vaping Use    Vaping Use: Never used   Substance and Sexual Activity    Alcohol use: Not Currently    Drug use: Never    Sexual activity: Not Currently   Other Topics Concern    Not on file   Social History Narrative    Not on file     Social Determinants of Health     Financial Resource Strain: Not on file   Food Insecurity: Not on file   Transportation Needs: Not on file   Physical Activity: Not on file   Stress: Not on file   Social Connections: Not on file   Intimate Partner Violence: Not on file   Housing Stability: Not on file          Review of Systems   Unable to perform ROS: Patient unresponsive   Constitutional:  Negative for fever. Respiratory:  Negative for cough and wheezing. Cardiovascular:  Negative for leg swelling. Gastrointestinal:  Negative for vomiting. Skin:  Negative for color change and rash. Neurological:  Positive for facial asymmetry, speech difficulty and weakness. Negative for tremors, seizures and syncope. Psychiatric/Behavioral:  Negative for agitation. Physical Exam  Vitals and nursing note reviewed. Constitutional:       General: She is not in acute distress. HENT:      Head: Normocephalic. Eyes:      Pupils: Pupils are equal, round, and reactive to light.    Cardiovascular:      Rate and Rhythm: Normal rate. Rhythm irregular. Pulmonary:      Effort: Pulmonary effort is normal.      Breath sounds: Normal breath sounds. Abdominal:      General: Bowel sounds are normal.      Palpations: Abdomen is soft. Skin:     General: Skin is warm and dry. Neurological:      Mental Status: She is oriented to person, place, and time. She is lethargic.              Medications:  Reviewed    Infusion Medications:    sodium chloride      sodium chloride      sodium chloride      dextrose       Scheduled Medications:    heparin (porcine)  5,000 Units SubCUTAneous 3 times per day    sodium chloride flush  5-40 mL IntraVENous 2 times per day    sodium chloride flush  5-40 mL IntraVENous 2 times per day    aspirin  81 mg Oral Daily    carvedilol  6.25 mg Oral BID    donepezil  5 mg Oral Nightly    insulin glargine  17 Units SubCUTAneous Daily    insulin glargine  6 Units SubCUTAneous Nightly    isosorbide mononitrate  30 mg Oral Daily    magnesium oxide  400 mg Oral BID    memantine  5 mg Oral BID    mirtazapine  7.5 mg Oral Nightly    insulin lispro  0-8 Units SubCUTAneous TID WC    insulin lispro  0-4 Units SubCUTAneous Nightly    melatonin  5 mg Oral Nightly    sennosides-docusate sodium  1 tablet Oral BID     PRN Meds: sodium chloride, oxyCODONE **OR** oxyCODONE, sodium chloride flush, sodium chloride, acetaminophen, ondansetron **OR** ondansetron, aluminum & magnesium hydroxide-simethicone, sodium chloride flush, sodium chloride, trimethobenzamide, glucose, dextrose bolus **OR** dextrose bolus, glucagon (rDNA), dextrose, polyethylene glycol    Labs:   Recent Labs     08/30/22  2358 08/31/22 0520 09/01/22 0613 09/01/22  1125   WBC 8.0 8.2 6.4  --    HGB 8.0* 7.3* 6.9* 8.7*   HCT 24.8* 22.5* 21.3*  --     133 123*  --      Recent Labs     08/30/22  0520 08/31/22  0520 09/01/22  0613 09/01/22  1125    136 140  --    K 4.9 5.1* 4.1  --     101 104  --    CO2 24 21 24  --    BUN 36* 34* 35*  --    CREATININE 1.99* 1.61* 1.48* 1.6*   CALCIUM 9.3 8.4* 8.1*  --      No results for input(s): AST, ALT, BILIDIR, BILITOT, ALKPHOS in the last 72 hours. No results for input(s): INR in the last 72 hours. No results for input(s): Yuliya Cuevaston in the last 72 hours. Urinalysis:   Lab Results   Component Value Date/Time    NITRU Negative 05/15/2022 03:45 PM    WBCUA 3-5 05/15/2022 03:45 PM    BACTERIA Negative 05/15/2022 03:45 PM    RBCUA 3-5 05/15/2022 03:45 PM    BLOODU Negative 05/15/2022 03:45 PM    SPECGRAV 1.020 05/15/2022 03:45 PM    GLUCOSEU >=1000 05/15/2022 03:45 PM       Radiology:   Most recent    EEG No valid procedures specified. MRI of Brain No results found for this or any previous visit. No results found for this or any previous visit. MRA of the Head and Neck: No results found for this or any previous visit. No results found for this or any previous visit. No results found for this or any previous visit. CT of the Head: Results for orders placed during the hospital encounter of 08/29/22    CT HEAD WO CONTRAST    Narrative  INDICATION: 60-year-old female found unresponsive. COMPARISON: 5/15/2022. TECHNIQUE: Multidetector CT imaging was obtained from the skull base to vertex without the administration of intravenous contrast. Coronal and sagittal reformatted images were obtained. Automated dose exposure control was used for this exam.    FINDINGS:    No evidence of parenchymal hemorrhages or contusions. No evidence of intra or extra-axial fluid collection is seen. Areas of low-attenuation are visualized in the periventricular and subcortical white matter demonstrating no change in comparison to the prior study, scattered chronic lacunar infarcts seen, findings consistent with chronic microvascular disease. No  evidence of acute territorial infarct is seen.     Prominence of the ventricles and sulci are visualized demonstrating no change in comparison to the prior study consistent with chronic atrophic brain changes. Visualized paranasal sinuses are well aerated. Visualized mastoid air cells are well aerated. The calvarium is intact. Impression  No acute intracranial hemorrhage or mass effect. Chronic atrophic brain changes and chronic microvascular disease. No results found for this or any previous visit. No results found for this or any previous visit. Carotid duplex: No results found for this or any previous visit. No results found for this or any previous visit. Results for orders placed during the hospital encounter of 07/31/18    US Carotid Artery Bilateral    Narrative  EXAMINATION: US CAROTID ARTERY BILATERAL:    DATE AND TIME: 7/31/2018 at 10:15 AM.    CLINICAL HISTORY: CAROTID ARTERY STENOSIS. BRUIT OF RIGHT CAROTID ARTERY. COMPARISON: None. TECHNIQUE: Carotid duplex sonograms which include gray scale and color flow evaluation are complimented with spectral waveform analysis. Please refer to chart below for specific carotid velocity measurements. The degree of stenosis recorded on this exam  uses the same method of stratification used in the NASCET trials. This complies with ACR practice guidelines and the Society of radiology in ultrasound consensus statement and provides adequate information for clinical decision making. Society of  Radiologists in Ultrasound (SRU) consensus statement was used to estimate internal carotid artery stenosis. FINDINGS: There is some heterogeneous calcified plaque in the proximal internal carotid arteries bilaterally. No significant increase in systolic flow or turbulence to indicate any hemodynamically significant narrowing in the right or left internal  carotid arteries. There is antegrade flow identified in both vertebral arteries.     ARTERIAL BLOOD FLOW VELOCITY    RIGHT PS    Prox CCA    62 cm/s  Mid CCA     63 cm/s  Dist CCA    86 cm/s  Prox ICA    55 cm/s  Mid ICA      76 cm/s  Dist ICA     62 cm/s  Prox ECA    109 cm/s  Prox VERT   antegrade    ICA/CCA     1.21    LEFT PS    Prox CCA    95 cm/s  Mid CCA     85 cm/s  Dist CCA    84 cm/s  Prox ICA    98 cm/s  Mid ICA      64 cm/s  Dist ICA      60 cm/s  Prox ECA    111 cm/s  Prox VERT   antegrade    ICA/CCA     1.15    Impression  <50  % STENOSIS IN THE RIGHT ICA    <50 % STENOSIS IN THE LEFT ICA      Echo No results found for this or any previous visit. Assessment/Plan:  Patient is a 60-year-old female with past medical history of hypertension, diabetes mellitus type 2, cardiomyopathy, CAD with stent, atrial fibrillation, anemia, pacemaker insertion who suffered a mechanical fall from standing resulting in acute comminuted intra-articular left distal femur fracture. No head strike reported. Patient underwent open reduction internal fixation of the left distal femur fracture on 8/30/2022. At approximately noon yesterday patient became lethargic and unresponsive. She remains that way currently. Upon exam she appears to have left facial droop and flaccid left upper extremity. Findings are concerning for acute CVA. Stat CT of the head was negative for acute findings. ABGs negative. No hypoglycemia. We will obtain MRI of the brain and carotid duplex. Will initiate aspirin rectally. Further recommendations to follow pending MRI.         Collaborating physicians: Dr Anushka Castellano    Electronically signed by TRA Berger CNP on 9/1/2022 at 2:21 PM

## 2022-09-01 NOTE — PROGRESS NOTES
Pt somnolent, unable to follow commands or open eyes. Does respond to pain. VSS. Dressing to LLE C/D/I. Immobilizer in place. 0215  Pt status unchanged. Still only responsive to pain.

## 2022-09-01 NOTE — PROGRESS NOTES
TRAUMA DAILY PROGRESS NOTE      Patient Name: Natalie Larry  Admission Date 2022    Hospital Day: 3  Patient seen and examined on 2022    INTERVAL HISTORY/EVENTS     Background:  Natalie Larry is a 80 y.o. female with a PMHx of HTN, dementia, HLD, T2DM presents as a CAT2 trauma s/p echanical fall from standing on 22 (-) head strike, (-) LOC, (-) AC/AP. Admitted to trauma and Ortho on as consult. Trauma workup found acute comminuted intra-articular left distal femur fracture. Hospital Course:  2022: FFS at nursing home. Ad[facility] to [facility]. 2022: OR with Ortho for ORIF of left femur fracture. 2022: Discharge upheld for concerns of AMS per daughter. Likely relate to recent aesthetic administration and narcotic use for pain control. 24 Hour Events:  Somnolent this am. Significantly different than compared to yesterday's exam. Now only responsive to pain. Left sided facial droop. Labs reviewed and significant for H&H drop of 7.3/22.5-> 6.9/21.3. BUN/Cr remains elevated at 34/1.61-> 35/1. 48. Appears to be patient's baseline      PHYSICAL EXAM     Vitals Average, Min and Max for last 24 hours:  Temp: Temp: 99.1 °F (37.3 °C); Temp  Av.4 °F (36.9 °C)  Min: 97.2 °F (36.2 °C)  Max: 99.1 °F (37.3 °C)  Respirations: Resp  Av  Min: 18  Max: 18  Pulse: Pulse  Av.4  Min: 89  Max: 106  Blood Pressure: Systolic (67MXU), TXE:674 , Min:89 , HGC:411   ; Diastolic (35IWI), DZJ:21, Min:37, Max:102  SpO2: SpO2  Av.8 %  Min: 89 %  Max: 100 %    24hr Intake/Output:   Intake/Output Summary (Last 24 hours) at 2022 0800  Last data filed at 2022 0606  Gross per 24 hour   Intake --   Output 500 ml   Net -500 ml     Vitals: BP (!) 107/55   Pulse (!) 106   Temp 99.1 °F (37.3 °C) (Oral)   Resp 18   Ht 5' 3\" (1.6 m)   Wt 110 lb (49.9 kg)   SpO2 95%   BMI 19.49 kg/m²     Physical Exam from morning rounds:  Constitutional: Somnolent. HEENT: Atraumatic. Hard of hearing. Left sided facial droop new compared to yesterday. Left sided visual threat absent. Cardiovascular: Regular rate and rhythm. Pulmonary: Breaths unlabored on room air. No wheezing, rhonchi or rales. Abdominal: Soft. Non-distended. Non-tender. Musculoskeletal: LLE with slight contracture of left knee. Post op dressings in place, c/d/I. KI in place. Neurological: Alert, awake, and orientated x 3. Motor and sensory grossly intact. Left sided deficits. Moves RUE spontaneously, does not move LUE. RLE and LLE responsive to pain only.     LABORATORY RESULTS (LAST 24 HOURS)     CBC with Differential:    Lab Results   Component Value Date/Time    WBC 6.4 09/01/2022 06:13 AM    RBC 2.43 09/01/2022 06:13 AM    HGB 6.9 09/01/2022 06:13 AM    HCT 21.3 09/01/2022 06:13 AM     09/01/2022 06:13 AM    MCV 87.6 09/01/2022 06:13 AM    MCH 28.6 09/01/2022 06:13 AM    MCHC 32.6 09/01/2022 06:13 AM    RDW 14.7 09/01/2022 06:13 AM    BANDSPCT 2 05/15/2022 03:45 PM    LYMPHOPCT 11.0 08/30/2022 11:58 PM    MONOPCT 7.2 08/30/2022 11:58 PM    BASOPCT 0.2 08/30/2022 11:58 PM    MONOSABS 0.6 08/30/2022 11:58 PM    LYMPHSABS 0.9 08/30/2022 11:58 PM    EOSABS 0.0 08/30/2022 11:58 PM    BASOSABS 0.0 08/30/2022 11:58 PM     CMP:    Lab Results   Component Value Date/Time     09/01/2022 06:13 AM    K 4.1 09/01/2022 06:13 AM    K 3.8 08/18/2021 06:08 AM     09/01/2022 06:13 AM    CO2 24 09/01/2022 06:13 AM    BUN 35 09/01/2022 06:13 AM    CREATININE 1.48 09/01/2022 06:13 AM    GFRAA 39.7 09/01/2022 06:13 AM    LABGLOM 32.8 09/01/2022 06:13 AM    GLUCOSE 190 09/01/2022 06:13 AM    GLUCOSE 74 06/08/2021 05:51 AM    PROT 7.0 08/29/2022 09:30 AM    LABALBU 3.7 08/29/2022 09:30 AM    CALCIUM 8.1 09/01/2022 06:13 AM    BILITOT <0.2 08/29/2022 09:30 AM    ALKPHOS 208 08/29/2022 09:30 AM    AST 26 08/29/2022 09:30 AM    ALT 12 08/29/2022 09:30 AM     Magnesium:    Lab Results   Component Value Date/Time    MG 2.0 09/01/2022 06:13 AM PT/INR:    Lab Results   Component Value Date/Time    PROTIME 13.2 08/29/2022 09:30 AM    INR 1.0 08/29/2022 09:30 AM     PTT:    Lab Results   Component Value Date/Time    APTT 31.3 08/29/2022 09:30 AM       IMAGING RESULTS (PERSONALLY REVIEWED)     All admission and follow up imaging reviewed. ASSESSMENT & PLAN     Diagnoses:  S/p fall from standing on 8/30  Acute comminuted intra-articular left distal femur fracture  Acute pain due to trauma  Acute blood loss anemia (transfused 1UPRBC on 9/1/22)    PMHx: HTN, dementia, HLD, T2DM    Incidental Findings: None, JLR 8/30/22      ASSESSMENT/PLAN:  Neurological: Somnolent on exam this am with left sided facial droop. Acute pain due to trauma, Hx dementia   - Unclear etiology for somnolence. Suspect post-anesthesia in setting of recent CNS depressant medications. CT Head negative, ABG without hypercarbia. Will consult Neurology. - Continue Oxycodone 2.5/5mg q4 PRN mod/severe pain  - Discontinue Robaxin 500mg q6   - Continue home donepezil 5mg daily, memantine 5mg BID, mirtazapine 7.5mg HS  - Continue Melatonin 5mg HS    Cardiovascular: Hx HTN, afib, HLD  - Continue vital signs per unit protocol  - Continue home ASA, carvedilol 6.25mg BID, imdur 30mg daily     Respiratory: No acute concerns  - Maintain O2 sats > 92%, encourage IS. GI/Diet: No acute concerns  - Regular diet 4 carb choices. - Bowel regimen: Senokot BID, miralax daily PRN     Renal/Electrolytes: No electrolyte abnormalities. BUN/Cr elevated at 27/1.32-> 36/1.99-> 34/1.61-> 35/1. 48.  Appears to be patient's baseline  - HLIV  - Continue home mag oxide 40mg BID  - AM BMP     ID: No leukocytosis, afebrile  - No indication for abx     Heme: Acute blood loss anemia postoperatively with H&H drop of 7.3/22.5-> 6.9/21.3  - Will transfuse 1UPRBC today  - AM CBC     Endocrine: Hx T2DM  - Continue home lantus 17 Units qAM, lantus 6 units HS  - ISS AC/HS  - Glucose check AC/HS     MSK: Left distal femur fracture s/p ORIF of left femur fracture. - Spines: Clear  - Weight Bearing Restrictions: NWB LLE with KI in place at all times. - Activity: as tolerated  - PT/OT: Will consult post-op     Prophylaxis:   - SCDs, heparin 5000units BID due to kidney function  - No indication for GI PPx    Dispo: Remain on trauma service for blood transfusion and STAT CT Head. Plans for SNF once medically cleared. Accom Status: General Status      - Follow up with Ortho in 2 weeks. Please call for any questions or concerns.   Akilah Lundberg Treatment Team Sticky Note for contact information]      Adina Juarez PA-C  Trauma/Critical Care  [see Treatment Team Sticky Note for contact information]     This patient's plan of care was discussed and made in collaboration with Trauma Attending physician, Laura Matson MD.

## 2022-09-01 NOTE — PROGRESS NOTES
Physical Therapy Missed Treatment   Facility/Department: [facility] MED SURG [id number]/[id number]    NAME: Patric Barthel  Patient Status:   : 1928 (80 y.o.)  MRN: [id number]  Account: [de-identified]  Gender: female        [] Patient Declines PT Treatment            [x] Patient Unavailable:     Pts hgb currently at 6.9. blood has been ordered and pt has order for stat CT. Will attempt PT Treatment again at earliest convenience.         Electronically signed by Latoya Stephenson PTA on 22 at 1:09 PM EDT

## 2022-09-01 NOTE — PLAN OF CARE
Problem: Pain  Goal: Verbalizes/displays adequate comfort level or baseline comfort level  9/1/2022 0958 by Dalila Arnold RN  Outcome: Progressing  9/1/2022 0027 by Angi Wood RN  Outcome: Progressing     Problem: Skin/Tissue Integrity  Goal: Absence of new skin breakdown  Description: 1. Monitor for areas of redness and/or skin breakdown  2. Assess vascular access sites hourly  3. Every 4-6 hours minimum:  Change oxygen saturation probe site  4. Every 4-6 hours:  If on nasal continuous positive airway pressure, respiratory therapy assess nares and determine need for appliance change or resting period.   9/1/2022 0958 by Dalila Arnold RN  Outcome: Progressing  9/1/2022 0027 by Angi Wood RN  Outcome: Progressing     Problem: Safety - Adult  Goal: Free from fall injury  9/1/2022 0958 by Dalila Arnold RN  Outcome: Progressing  9/1/2022 0027 by Angi Wood RN  Outcome: Progressing     Problem: ABCDS Injury Assessment  Goal: Absence of physical injury  9/1/2022 0958 by Dalila Arnold RN  Outcome: Progressing  9/1/2022 0027 by Angi Wood RN  Outcome: Progressing     Problem: Chronic Conditions and Co-morbidities  Goal: Patient's chronic conditions and co-morbidity symptoms are monitored and maintained or improved  9/1/2022 0958 by Dalila Arnold RN  Outcome: Progressing  9/1/2022 0027 by Angi Wood RN  Outcome: Progressing     Problem: Discharge Planning  Goal: Discharge to home or other facility with appropriate resources  9/1/2022 0958 by Dalila Arnold RN  Outcome: Progressing  9/1/2022 0027 by Angi Wood RN  Outcome: Progressing

## 2022-09-01 NOTE — PLAN OF CARE
Problem: Pain  Goal: Verbalizes/displays adequate comfort level or baseline comfort level  Outcome: Progressing     Problem: Skin/Tissue Integrity  Goal: Absence of new skin breakdown  Description: 1. Monitor for areas of redness and/or skin breakdown  2. Assess vascular access sites hourly  3. Every 4-6 hours minimum:  Change oxygen saturation probe site  4. Every 4-6 hours:  If on nasal continuous positive airway pressure, respiratory therapy assess nares and determine need for appliance change or resting period. Outcome: Progressing     Problem: Safety - Adult  Goal: Free from fall injury  Outcome: Progressing     Problem: ABCDS Injury Assessment  Goal: Absence of physical injury  Outcome: Progressing     Problem: Chronic Conditions and Co-morbidities  Goal: Patient's chronic conditions and co-morbidity symptoms are monitored and maintained or improved  Outcome: Progressing     Problem: Discharge Planning  Goal: Discharge to home or other facility with appropriate resources  Outcome: Progressing     Problem: Physical Therapy - Adult  Goal: By Discharge: Performs mobility at highest level of function for planned discharge setting. See evaluation for individualized goals.   8/31/2022 1315 by Cristobal Beard PT  Outcome: Progressing

## 2022-09-02 LAB
CHOLESTEROL, TOTAL: 127 MG/DL (ref 0–199)
GLUCOSE BLD-MCNC: 113 MG/DL (ref 70–99)
GLUCOSE BLD-MCNC: 194 MG/DL (ref 70–99)
GLUCOSE BLD-MCNC: 251 MG/DL (ref 70–99)
GLUCOSE BLD-MCNC: 323 MG/DL (ref 70–99)
HBA1C MFR BLD: 12.2 % (ref 4.8–5.9)
HDLC SERPL-MCNC: 34 MG/DL (ref 40–59)
LDL CHOLESTEROL CALCULATED: 61 MG/DL (ref 0–129)
PERFORMED ON: ABNORMAL
TRIGL SERPL-MCNC: 161 MG/DL (ref 0–150)

## 2022-09-02 PROCEDURE — 1210000000 HC MED SURG R&B

## 2022-09-02 PROCEDURE — 6370000000 HC RX 637 (ALT 250 FOR IP): Performed by: PHYSICIAN ASSISTANT

## 2022-09-02 PROCEDURE — 6370000000 HC RX 637 (ALT 250 FOR IP): Performed by: STUDENT IN AN ORGANIZED HEALTH CARE EDUCATION/TRAINING PROGRAM

## 2022-09-02 PROCEDURE — 92610 EVALUATE SWALLOWING FUNCTION: CPT

## 2022-09-02 PROCEDURE — 97535 SELF CARE MNGMENT TRAINING: CPT

## 2022-09-02 PROCEDURE — 2580000003 HC RX 258: Performed by: PHYSICIAN ASSISTANT

## 2022-09-02 PROCEDURE — 99232 SBSQ HOSP IP/OBS MODERATE 35: CPT | Performed by: NURSE PRACTITIONER

## 2022-09-02 PROCEDURE — 6370000000 HC RX 637 (ALT 250 FOR IP): Performed by: NURSE PRACTITIONER

## 2022-09-02 PROCEDURE — 6370000000 HC RX 637 (ALT 250 FOR IP): Performed by: ORTHOPAEDIC SURGERY

## 2022-09-02 PROCEDURE — 80061 LIPID PANEL: CPT

## 2022-09-02 PROCEDURE — 92523 SPEECH SOUND LANG COMPREHEN: CPT

## 2022-09-02 PROCEDURE — 6360000002 HC RX W HCPCS: Performed by: PHYSICIAN ASSISTANT

## 2022-09-02 PROCEDURE — 36415 COLL VENOUS BLD VENIPUNCTURE: CPT

## 2022-09-02 PROCEDURE — 83036 HEMOGLOBIN GLYCOSYLATED A1C: CPT

## 2022-09-02 RX ORDER — ATORVASTATIN CALCIUM 80 MG/1
80 TABLET, FILM COATED ORAL NIGHTLY
Status: DISCONTINUED | OUTPATIENT
Start: 2022-09-02 | End: 2022-09-04 | Stop reason: HOSPADM

## 2022-09-02 RX ORDER — OXYCODONE HYDROCHLORIDE 5 MG/1
5 TABLET ORAL EVERY 4 HOURS PRN
Status: DISCONTINUED | OUTPATIENT
Start: 2022-09-02 | End: 2022-09-04 | Stop reason: HOSPADM

## 2022-09-02 RX ORDER — ISOSORBIDE DINITRATE 10 MG/1
20 TABLET ORAL 3 TIMES DAILY
Status: DISCONTINUED | OUTPATIENT
Start: 2022-09-02 | End: 2022-09-02

## 2022-09-02 RX ORDER — OXYCODONE HYDROCHLORIDE 5 MG/1
7.5 TABLET ORAL EVERY 4 HOURS PRN
Status: DISCONTINUED | OUTPATIENT
Start: 2022-09-02 | End: 2022-09-04 | Stop reason: HOSPADM

## 2022-09-02 RX ORDER — ISOSORBIDE DINITRATE 10 MG/1
15 TABLET ORAL 2 TIMES DAILY
Status: DISCONTINUED | OUTPATIENT
Start: 2022-09-02 | End: 2022-09-04 | Stop reason: HOSPADM

## 2022-09-02 RX ADMIN — SENNOSIDES AND DOCUSATE SODIUM 1 TABLET: 50; 8.6 TABLET ORAL at 20:53

## 2022-09-02 RX ADMIN — DONEPEZIL HYDROCHLORIDE 5 MG: 5 TABLET, FILM COATED ORAL at 20:53

## 2022-09-02 RX ADMIN — SENNOSIDES AND DOCUSATE SODIUM 1 TABLET: 50; 8.6 TABLET ORAL at 10:34

## 2022-09-02 RX ADMIN — Medication 400 MG: at 20:53

## 2022-09-02 RX ADMIN — INSULIN GLARGINE 6 UNITS: 100 INJECTION, SOLUTION SUBCUTANEOUS at 20:55

## 2022-09-02 RX ADMIN — ASPIRIN 300 MG: 300 SUPPOSITORY RECTAL at 14:39

## 2022-09-02 RX ADMIN — ISOSORBIDE DINITRATE 15 MG: 10 TABLET ORAL at 20:54

## 2022-09-02 RX ADMIN — ACETAMINOPHEN 650 MG: 325 TABLET ORAL at 10:30

## 2022-09-02 RX ADMIN — Medication 400 MG: at 10:34

## 2022-09-02 RX ADMIN — CARVEDILOL 6.25 MG: 3.12 TABLET, FILM COATED ORAL at 20:53

## 2022-09-02 RX ADMIN — Medication 10 ML: at 20:54

## 2022-09-02 RX ADMIN — MEMANTINE 5 MG: 5 TABLET ORAL at 10:34

## 2022-09-02 RX ADMIN — INSULIN LISPRO 6 UNITS: 100 INJECTION, SOLUTION INTRAVENOUS; SUBCUTANEOUS at 12:43

## 2022-09-02 RX ADMIN — HEPARIN SODIUM 5000 UNITS: 5000 INJECTION INTRAVENOUS; SUBCUTANEOUS at 20:54

## 2022-09-02 RX ADMIN — Medication 5 MG: at 20:53

## 2022-09-02 RX ADMIN — CARVEDILOL 6.25 MG: 3.12 TABLET, FILM COATED ORAL at 10:32

## 2022-09-02 RX ADMIN — ATORVASTATIN CALCIUM 80 MG: 80 TABLET, FILM COATED ORAL at 20:54

## 2022-09-02 RX ADMIN — HEPARIN SODIUM 5000 UNITS: 5000 INJECTION INTRAVENOUS; SUBCUTANEOUS at 14:39

## 2022-09-02 RX ADMIN — MEMANTINE 5 MG: 5 TABLET ORAL at 20:54

## 2022-09-02 RX ADMIN — HEPARIN SODIUM 5000 UNITS: 5000 INJECTION INTRAVENOUS; SUBCUTANEOUS at 06:12

## 2022-09-02 ASSESSMENT — ENCOUNTER SYMPTOMS
TROUBLE SWALLOWING: 1
ABDOMINAL DISTENTION: 0
COLOR CHANGE: 0
VOMITING: 0
COUGH: 0
WHEEZING: 0

## 2022-09-02 NOTE — PROGRESS NOTES
FEMORAL FRACTURE performed by Aramis Cannon MD at [facility] Right 5/13/2021    CLOSED  REDUCTION INTERNAL FIXATION RIGHT  DISTAL RADIUS PINNING performed by Latisha English MD at [address] Left 12/2012    PACEMAKER INSERTION      TONSILLECTOMY      WRIST FRACTURE SURGERY Left 12/2012     Allergies   Allergen Reactions    Tylenol [Acetaminophen] Nausea And Vomiting and Rash       DATE ONSET: 8/31/22    Date of Evaluation: 9/2/2022   Evaluating Therapist: MARIA ELENA Shen    Dysphagia Diagnosis  Dysphagia Diagnosis: Moderate oral stage dysphagia;Mild pharyngeal stage dysphagia; Concerns for esophageal stage dysphagia  Dysphagia Impression : Pt p/w mild-moderate oropharyngeal phase dysphagia characterized by impaired lingual and labial strength/ROM/coordination and left asymmetry resulting in decreased mastication and propulsion of soft and regular solids with moderate residue and pocketing that had to be extracted by SLP with toothette. Pt had x1 immediate coughing with thin by straw and had no overt s/s of aspiration with cup sips of thin. Pt needed assistance with all bites and sips as patient was reaching for cup and becoming impulsive. Pt tolerated puree trials with 1st trial having decreased bolus acceptance, but after SLP explained pt was able to accept bolus. Recommended Diet  Recommendations: Total feed  Diet Solids Recommendation: Minced & Moist  Liquid Consistency Recommendation: Thin  Recommended Form of Meds: Crushed in puree as able  Compensatory Swallowing Strategies : No straws;Eat/Feed slowly; Alternate solids and liquids;Upright as possible for all oral intake;Small bites/sips; Total feed      Reason for Referral  Chandler Esparza was referred for a bedside swallow evaluation to assess the efficiency of her swallow function, identify signs and symptoms of aspiration, identify risk factors, and make recommendations regarding safe dietary consistencies, effective compensatory strategies, and safe eating environment. General  Behavior/Cognition: Alert;Confused  Temperature Spikes Noted: No  Respiratory Status: O2 via nasual cannula  O2 Device: Nasal cannula  Liters of Oxygen: 2 L  Communication Observation: Dysarthria;Aphasia  Follows Directions: Simple  Dentition: Some missing teeth  Patient Positioning: Upright in bed  Baseline Vocal Quality: Weak  Consistencies Administered: Regular; Soft and Bite-Sized;Pureed; Thin - cup; Thin - straw    Vision and Hearing  Vision  Vision: Impaired  Hearing  Hearing: Exceptions to [facility]  Hearing Exceptions: Hard of hearing/hearing concerns;Bilateral hearing aid ([employer] even with B HA)    Current Diet level  Current Diet : Regular  Current Liquid Diet : Thin    Oral Motor  Labial: Left droop; Impaired coordination  Dentition: Natural  Oral Hygiene: Moist;Clean  Lingual: Decreased strength; Incoordinated;Left deviation (decreased ROM)  Mandible: No impairment  Gag: No Impairment    Oral/Pharyngeal Phase  Oral Phase - Comment: Moderate oral dysphagia  Pharyngeal Phase: mild pharyngeal dysphagia    PO Trials  Neuromuscular Estim Used: No  Assessment Method(s): Observation;Palpation  Vocal Quality: Low volume  Consistency Presented: Soft & Bite Sized; Regular  How Presented: SLP-fed/Presented  How much presented: 2  Bolus Acceptance: Impaired (needed verbal cues to open mouth and close lips around spoon)  Bolus Formation/Control: Impaired  Type of Impairment: Mastication; Suspected premature spilling  Propulsion: Discoordination; Tongue pumping  Oral Residue: 10-50% of bolus; Lingual;Pocketing  Initiation of Swallow: Delayed (# of seconds) (soft and regular solids delay of 6-8 seconds)  Laryngeal Elevation: Functional  Aspiration Signs/Symptoms: Delayed cough/throat clear (delayed cough with regular solids)  Additional PO Trials: 2      PO Trials 2  Consistency Presented: Pureed  How Presented: SLP-fed/Presented;Spoon  How much presented: 3  Bolus Acceptance: Impaired (impaired acceptance with 1st trial)  Bolus Formation/Control: No impairment  Propulsion: No impairment  Oral Residue: None  Initiation of Swallow: No impairment  Aspiration Signs/Symptoms: None  Pharyngeal Phase Characteristics: No impairment, issues, or problems      PO Trials 3  Consistency Presented: Thin  How Presented: Cup/sip;Straw  How much presented: 4  Bolus Acceptance: No impairment  Bolus Formation/Control: No impairment  Propulsion: No impairment  Oral Residue: None  Aspiration Signs/Symptoms: Delayed cough/throat clear (immediate cough with thin by straw, no coughing with cup sips)  Effective Modifications: Cup/sip         Dysphagia Diagnosis  Dysphagia Diagnosis: Moderate oral stage dysphagia;Mild pharyngeal stage dysphagia; Concerns for esophageal stage dysphagia  Dysphagia Impression : Pt p/w mild-moderate oropharyngeal phase dysphagia characterized by impaired lingual and labial strength/ROM/coordination and left asymmetry resulting in decreased mastication and propulsion of soft and regular solids with moderate residue and pocketing that had to be extracted by SLP with toothette. Pt had x1 immediate coughing with thin by straw and had no overt s/s of aspiration with cup sips of thin. Pt needed assistance with all bites and sips as patient was reaching for cup and becoming impulsive. Pt tolerated puree trials with 1st trial having decreased bolus acceptance, but after SLP explained pt was able to accept bolus. Dysphagia Outcome Severity Scale: Level 4: Mild moderate dysphagia- Intermittent supervision/cueing. One - two diet consistencies restricted     Recommendations  Requires SLP Intervention: Yes  Recommendations: Total feed  D/C Recommendations: Ongoing speech therapy is recommended during this hospitalization  Diet Solids Recommendation: Minced & Moist  Liquid Consistency Recommendation:  Thin  Compensatory Swallowing Strategies : No straws;Eat/Feed slowly; Alternate solids and liquids;Upright as possible for all oral intake;Small bites/sips; Total feed  Recommended Form of Meds: Crushed in puree as able  Duration of Treatment: during LOS or until goals met  Frequency of Treatment: 2-3x/week    Prognosis  Speech Therapy Prognosis  Prognosis: Good  Prognosis Considerations: Previous Level of Function    Education  Individuals consulted  Consulted and agree with results and recommendations: Patient;RN  RN Name: [facility]    Treatment/Goals  Short-term Goals  Timeframe for Short-term Goals: 1-2 weeks  Goal 1: Pt will complete oral motor ROM and strengthening exercises with 80%  accuracy, given cues as needed, in order to strengthen lingual/labial/buccal musculature to promote safety and efficiency of oral phase of swallow and decrease risk for pocketing. Goal 2: Pt will complete lingual exercises that promote anterior to posterior propulsion of bolus and improve tongue base retraction with 80% accuracy in order to strengthen the muscles of the swallow to decrease risk of aspiration and  to increase ability to safely handle the least restrictive diet level. Goal 3: Pt will demonstrate compensatory swallow strategies for safe and efficient swallow of minced and moist/thin consistencies in all given opportunities. Goal 4: Pt will tolerate 5/5 trials of soft/thin consistencies with adequate mastication and oral clearance of bolus in all opportunities. Goal 5: Pt will tolerate the least restrictive diet level without clinical s/s of aspiration and adequate oral clearance  Long-term Goals  Timeframe for Long-term Goals: 2 weeks  Goal 1: Pt will tolerate the recommended diet level without clinical s/s of aspiration and adequate oral clearance  Dysphagia Goals:  The patient will tolerate recommended diet without observed clinical signs of aspiration    Safety Devices  Safety Devices  Safety Devices in place: Yes  Type of devices: Bed alarm in place;Call light within reach    Pain Assessment  Patient does not appear in pain. Pain Re-assessment  Patient does not appear in pain.       Therapy Time  SLP Individual Minutes  Time In: 1018  Time Out: 1493  Minutes: 11              Signature: Electronically signed by MARIA ELENA Diggs on 9/2/2022 at 10:04 AM

## 2022-09-02 NOTE — PROGRESS NOTES
Physical Therapy Med Surg Daily Treatment Note  [facility]  [facility]       NAME: Phi Patel  : 1928 (89 y.o.)  MRN: [id number]  CODE STATUS: DNR-CCA    Date of Service: 2022    Patient Diagnosis(es): Closed disp comminuted fracture of shaft of right femur with malunion [S72.351P]  Other closed fracture of distal end of left femur, initial encounter [facility]) [S72.492A]   Chief Complaint   Patient presents with    Fall     Pt to the ED via EMS from [facility] with left hip pain after fall at home. Pt is alert but very [place]. Pt unable to straighten left leg and grabbing at left hip. Pt fell in SNF by tripping over a piece of furniture.       Patient Active Problem List    Diagnosis Date Noted    Closed fracture of left distal femur ([address] 2022    Cerebrovascular accident (CVA) ([address] 2022    Post-op pain 2022    Acute pain due to trauma 2022    Hyperglycemia 2021    Type 2 diabetes mellitus with hyperosmolar hyperglycemic state (HHS) ([address] 2021    Closed nondisplaced transverse fracture of right patella with routine healing 2021    Closed fracture of right distal radius and ulna     Closed nondisp transvrs fracture of right patella with routine healing 2021    Diabetes mellitus ([address]     Pacemaker     Cardiomyopathy ([address]     Ischemic cardiomyopathy 2018    CAD (coronary artery disease) 2018    Hypertension 2018    Pure hypercholesterolemia 2018        Past Medical History:   Diagnosis Date    Anemia     Atrial fibrillation (HCC)     CAD (coronary artery disease)     Cardiomyopathy ([address]     CHF (congestive heart failure) ([address]     Diabetes mellitus ([address]     Hypertension      Past Surgical History:   Procedure Laterality Date    APPENDECTOMY      CORONARY ANGIOPLASTY WITH STENT PLACEMENT      FEMUR FRACTURE SURGERY Left 2022    OPEN REDUCTION INTERNAL FIXATION LEFT DISTAL FEMORAL FRACTURE performed by Sergio Chu MD at [facility] Right 5/13/2021    CLOSED  REDUCTION INTERNAL FIXATION RIGHT  DISTAL RADIUS PINNING performed by Brain Collet, MD at [facility] [id number] Left 12/2012    PACEMAKER INSERTION      TONSILLECTOMY      WRIST FRACTURE SURGERY Left 12/2012       Chart Reviewed: Yes    Restrictions:  Restrictions/Precautions: Weight Bearing; Fall Risk  Lower Extremity Weight Bearing Restrictions  Left Lower Extremity Weight Bearing: Non Weight Bearing  Required Braces or Orthoses  Left Lower Extremity Brace: Knee Immobilizer (at all times except for hygiene- no knee ROM)    SUBJECTIVE:   Subjective: Pt minimally verbal but nods head yes in agreement to tx    Pain  Pain: unable to rate but moans out in pain with movement    OBJECTIVE:   Orientation  Overall Orientation Status: Impaired  Cognition  Overall Cognitive Status: Exceptions    Bed mobility  Supine to Sit: Dependent/Total;2 Person assistance  Sit to Supine: Dependent/Total;2 Person assistance  Bed Mobility Comments: Pt with poor ability to initiate movement or assist with coming to EOB, pt very slow to complete all mobility and vc's given for technique and sequencing but pt unable to carryover. Pt with heavy R lean upon seated EOB needing MaxA to maintain midline. Pt with heavy compensation due to significant weakness L > R. Transfers  Sit to Stand: Unable to assess  Stand to sit: Unable to assess  Comment: N/T- pt with poor seated balance and tolerance to all mobility    Ambulation  Comments: N/T- pt with poor seated balance and tolerance to all mobility        Activity Tolerance  Activity Tolerance: Treatment limited secondary to medical complications          ASSESSMENT   Assessment: Pt very [place] and requires frequent repititon and cueing for all tasks. Pt heavily favors R side d/t to strong L sided weakness. Pt unable to visually track or turn head to left side with any activities.  Pt moans out in pain while seated EOB and leans backwards to be returned to bed. Unsafe to test mobilty further this session     Discharge Recommendations:  Patient would benefit from continued therapy after discharge, Therapy recommended at discharge         Goals  Long Term Goals  Long term goal 1: Rolling with Min A to decrease caregiver burden  Long term goal 2: Supine <>sit with Mod A to promote upright posture  Long term goal 3: Pt will sit unsupported with SBA x 6 min  Long term goal 4: Pt will be SBA for HEP to improve R LE strength and coordination  Long term goal 5: progress to functional transfers with Max A to promote OOB activity  Patient Goals   Patient goals : unable to state    PLAN    Plan: 1 time a day 7 days a week        [facility] (6 CLICK) [id number] Mobility Raw Score : 7     Therapy Time   Individual   Time In 0830   Time Out 0845   Minutes 15      BM/trsf- DEVONTE Dunn, 09/02/22 at 10:28 AM         Definitions for assistance levels  Independent = pt does not require any physical supervision or assistance from another person for activity completion. Device may be needed.   Stand by assistance = pt requires verbal cues or instructions from another person, close to but not touching, to perform the activity  Minimal assistance= pt performs 75% or more of the activity; assistance is required to complete the activity  Moderate assistance= pt performs 50% of the activity; assistance is required to complete the activity  Maximal assistance = pt performs 25% of the activity; assistance is required to complete the activity  Dependent = pt requires total physical assistance to accomplish the task

## 2022-09-02 NOTE — PROGRESS NOTES
[facility] Neurology Daily Progress Note  Name: Skylar Velasquez  Age: 80 y.o. Gender: female  CodeStatus: DNR-CCA  Allergies: Tylenol [Acetaminophen]    Chief Complaint:Fall (Pt to the ED via EMS from [facility] with left hip pain after fall at home. Pt is alert but very [place]. Pt unable to straighten left leg and grabbing at left hip. Pt fell in SNF by tripping over a piece of furniture. )    Primary Care Provider: Familia Chowdhury MD  InpatientTreatment Team: Treatment Team: Attending Provider: Bijal Aslton MD; Utilization Reviewer: Aggie Ordaz, VICTOR MANUEL; Consulting Physician: Floyd Mariscal APRN - CNP; Registered Nurse: Umesh Moses RN; Registered Nurse: Devere Ganser, VICTOR MANUEL; Registered Nurse: Francesca Faye RN  Admission Date: 8/29/2022      HPI   Pt seen and examined for neuro follow up for acute CVA. Patient is more awake today. Confused. Does not purposely follow commands. Speech minimally garbled. Left facial droop persist but improved. Left-sided hemiparesis noted. Left-sided neglect noted. No seizure activity reported. Speech eval completed patient on altered diet. Awaiting MRI of the brain results        Vitals:    09/02/22 0830   BP: (!) 131/59   Pulse: 87   Resp: 16   Temp: 97.7 °F (36.5 °C)   SpO2: 99%        Review of Systems   Unable to perform ROS: Mental status change   Constitutional:  Negative for fever. HENT:  Positive for hearing loss ([place], bilat hearing aides) and trouble swallowing. Respiratory:  Negative for cough and wheezing. Cardiovascular:  Negative for leg swelling. Gastrointestinal:  Negative for abdominal distention and vomiting. Musculoskeletal:  Negative for gait problem. Skin:  Negative for color change and rash. Neurological:  Positive for facial asymmetry, speech difficulty and weakness. Negative for tremors, seizures and syncope. Psychiatric/Behavioral:  Positive for confusion. Negative for agitation and hallucinations. The patient is not nervous/anxious. Physical Exam  Vitals and nursing note reviewed. Constitutional:       General: She is not in acute distress. Appearance: She is not diaphoretic. HENT:      Head: Normocephalic. Eyes:      Pupils: Pupils are equal, round, and reactive to light. Cardiovascular:      Rate and Rhythm: Normal rate and regular rhythm. Pulmonary:      Effort: Pulmonary effort is normal. No respiratory distress. Breath sounds: Normal breath sounds. Abdominal:      General: Bowel sounds are normal. There is no distension. Palpations: Abdomen is soft. Tenderness: There is no abdominal tenderness. Skin:     General: Skin is warm and dry. Neurological:      Mental Status: She is alert. She is disoriented. Cranial Nerves: Dysarthria and facial asymmetry present. Motor: Weakness (left 0/5) and abnormal muscle tone present. No tremor, atrophy or seizure activity. Coordination: Coordination abnormal (unable left). Finger-Nose-Finger Test abnormal (unable left).              Medications:  Reviewed    Infusion Medications:    sodium chloride      sodium chloride 50 mL/hr at 09/01/22 2249    sodium chloride      sodium chloride      dextrose       Scheduled Medications:    aspirin  300 mg Rectal Daily    heparin (porcine)  5,000 Units SubCUTAneous 3 times per day    sodium chloride flush  5-40 mL IntraVENous 2 times per day    sodium chloride flush  5-40 mL IntraVENous 2 times per day    carvedilol  6.25 mg Oral BID    donepezil  5 mg Oral Nightly    [Held by provider] insulin glargine  17 Units SubCUTAneous Daily    insulin glargine  6 Units SubCUTAneous Nightly    isosorbide mononitrate  30 mg Oral Daily    magnesium oxide  400 mg Oral BID    memantine  5 mg Oral BID    [Held by provider] mirtazapine  7.5 mg Oral Nightly    insulin lispro  0-8 Units SubCUTAneous TID     insulin lispro  0-4 Units SubCUTAneous Nightly    melatonin  5 mg Oral Nightly    sennosides-docusate sodium  1 tablet Oral BID     PRN Meds: sodium chloride, [Held by provider] oxyCODONE **OR** [Held by provider] oxyCODONE, sodium chloride flush, sodium chloride, acetaminophen, ondansetron **OR** ondansetron, aluminum & magnesium hydroxide-simethicone, sodium chloride flush, sodium chloride, trimethobenzamide, glucose, dextrose bolus **OR** dextrose bolus, glucagon (rDNA), dextrose, polyethylene glycol    Labs:   Recent Labs     08/30/22  2358 08/31/22  0520 09/01/22  0613 09/01/22  1125 09/01/22  1457   WBC 8.0 8.2 6.4  --   --    HGB 8.0* 7.3* 6.9* 8.7* 8.5*   HCT 24.8* 22.5* 21.3*  --  24.9*    133 123*  --   --      Recent Labs     08/31/22  0520 09/01/22  0613 09/01/22  1125    140  --    K 5.1* 4.1  --     104  --    CO2 21 24  --    BUN 34* 35*  --    CREATININE 1.61* 1.48* 1.6*   CALCIUM 8.4* 8.1*  --      No results for input(s): AST, ALT, BILIDIR, BILITOT, ALKPHOS in the last 72 hours. No results for input(s): INR in the last 72 hours. No results for input(s): Magdalene Smith in the last 72 hours. Urinalysis:   Lab Results   Component Value Date/Time    NITRU Negative 05/15/2022 03:45 PM    WBCUA 3-5 05/15/2022 03:45 PM    BACTERIA Negative 05/15/2022 03:45 PM    RBCUA 3-5 05/15/2022 03:45 PM    BLOODU Negative 05/15/2022 03:45 PM    SPECGRAV 1.020 05/15/2022 03:45 PM    GLUCOSEU >=1000 05/15/2022 03:45 PM       Radiology:   Most recent    EEG No valid procedures specified. MRI of Brain No results found for this or any previous visit. No results found for this or any previous visit. MRA of the Head and Neck: No results found for this or any previous visit. No results found for this or any previous visit. No results found for this or any previous visit. CT of the Head: Results for orders placed during the hospital encounter of 08/29/22    CT HEAD WO CONTRAST    Narrative  INDICATION: 57-year-old female found unresponsive.     COMPARISON: 5/15/2022. TECHNIQUE: Multidetector CT imaging was obtained from the skull base to vertex without the administration of intravenous contrast. Coronal and sagittal reformatted images were obtained. Automated dose exposure control was used for this exam.    FINDINGS:    No evidence of parenchymal hemorrhages or contusions. No evidence of intra or extra-axial fluid collection is seen. Areas of low-attenuation are visualized in the periventricular and subcortical white matter demonstrating no change in comparison to the prior study, scattered chronic lacunar infarcts seen, findings consistent with chronic microvascular disease. No  evidence of acute territorial infarct is seen. Prominence of the ventricles and sulci are visualized demonstrating no change in comparison to the prior study consistent with chronic atrophic brain changes. Visualized paranasal sinuses are well aerated. Visualized mastoid air cells are well aerated. The calvarium is intact. Impression  No acute intracranial hemorrhage or mass effect. Chronic atrophic brain changes and chronic microvascular disease. No results found for this or any previous visit. No results found for this or any previous visit. Carotid duplex: No results found for this or any previous visit. No results found for this or any previous visit. Results for orders placed during the hospital encounter of 07/31/18    US Carotid Artery Bilateral    Narrative  EXAMINATION: US CAROTID ARTERY BILATERAL:    DATE AND TIME: 7/31/2018 at 10:15 AM.    CLINICAL HISTORY: CAROTID ARTERY STENOSIS. BRUIT OF RIGHT CAROTID ARTERY. COMPARISON: None. TECHNIQUE: Carotid duplex sonograms which include gray scale and color flow evaluation are complimented with spectral waveform analysis. Please refer to chart below for specific carotid velocity measurements. The degree of stenosis recorded on this exam  uses the same method of stratification used in the NASCET trials.  This complies with ACR practice guidelines and the Society of radiology in ultrasound consensus statement and provides adequate information for clinical decision making. Society of  Radiologists in Ultrasound (SRU) consensus statement was used to estimate internal carotid artery stenosis. FINDINGS: There is some heterogeneous calcified plaque in the proximal internal carotid arteries bilaterally. No significant increase in systolic flow or turbulence to indicate any hemodynamically significant narrowing in the right or left internal  carotid arteries. There is antegrade flow identified in both vertebral arteries. ARTERIAL BLOOD FLOW VELOCITY    RIGHT PS    Prox CCA    62 cm/s  Mid CCA     63 cm/s  Dist CCA    86 cm/s  Prox ICA    55 cm/s  Mid ICA      76 cm/s  Dist ICA     62 cm/s  Prox ECA    109 cm/s  Prox VERT   antegrade    ICA/CCA     1.21    LEFT PS    Prox CCA    95 cm/s  Mid CCA     85 cm/s  Dist CCA    84 cm/s  Prox ICA    98 cm/s  Mid ICA      64 cm/s  Dist ICA      60 cm/s  Prox ECA    111 cm/s  Prox VERT   antegrade    ICA/CCA     1.15    Impression  <50  % STENOSIS IN THE RIGHT ICA    <50 % STENOSIS IN THE LEFT ICA      Echo No results found for this or any previous visit. Assessment/Plan:    9/1/22:  Patient is a 70-year-old female with past medical history of hypertension, diabetes mellitus type 2, cardiomyopathy, CAD with stent, atrial fibrillation, anemia, pacemaker insertion who suffered a mechanical fall from standing resulting in acute comminuted intra-articular left distal femur fracture. No head strike reported. Patient underwent open reduction internal fixation of the left distal femur fracture on 8/30/2022. At approximately noon yesterday patient became lethargic and unresponsive. She remains that way currently. Upon exam she appears to have left facial droop and flaccid left upper extremity. Findings are concerning for acute CVA.   Stat CT of the head was negative for acute findings. ABGs negative. No hypoglycemia. We will obtain MRI of the brain and carotid duplex. Will initiate aspirin rectally. Further recommendations to follow pending MRI.    9/2/22:  CVA with left-sided weakness, left facial droop, left-sided neglect  MRI of the brain remains pending. Pacemaker is compatible.   Patient with history of atrial fibrillation we will place on telemetry  Carotid duplex completed with report pending  Check lipid panel and hemoglobin A1c  Add intensity statin therapy  Collaborating physicians: Dr Emily Richardson    Electronically signed by TRA Albright CNP on 9/2/2022 at 10:25 AM

## 2022-09-02 NOTE — CARE COORDINATION
Patient had a medical status change, MRI pending. Patient can return to [facility] once medically stable, cleared, [id number] completed. LSW/CM to follow for any new d/c needs or changes to the d/c plan.

## 2022-09-02 NOTE — PROGRESS NOTES
Patient able to state her name, speech remains garbled but she is more alert compared to yesterday. Small BM today, patient urinating, no s/sx of retention. Tele monitor ordered and placed on patient. New mepilex applied to heels and sacrum, barrier cream applied to sacrum as well. Patient nods yes when asked if she is in pain, medicated with PRN tylenol.    Remains 99% on [facility]  Daughter at bedside   Attempt to call MRI, no response

## 2022-09-02 NOTE — PROGRESS NOTES
Per MRI, they are waiting on dr Tess Tim (implanting cardiologist of pacemaker) to sign pacer form that was faxed to his office before MRI can be completed. MRI states they do not perform pacer MRIs after 4 today and on the weekends. Call sent to dr rico's office, they state he is on vacation until the 9th but that dr Iveth Escobar is able to sign.  States they will notify him

## 2022-09-02 NOTE — PROGRESS NOTES
TRAUMA DAILY PROGRESS NOTE      Patient Name: Patric Barthel  Admission Date 2022    Hospital Day: 4  Patient seen and examined on 2022    INTERVAL HISTORY/EVENTS     Background:  Patric Barthel is a 80 y.o. female with a PMHx of HTN, dementia, HLD, T2DM presents as a CAT2 trauma s/p echanical fall from standing on 22 (-) head strike, (-) LOC, (-) AC/AP. Admitted to trauma and Ortho on as consult. Trauma workup found acute comminuted intra-articular left distal femur fracture. Hospital Course:  2022: FFS at nursing home. Admitted to [facility]. 2022: OR with Ortho for ORIF of left femur fracture. 2022: Discharge upheld for concerns of AMS.  2022: New left sided facial droop and flaccid LUE. STAT CT head negative. Neurology consulted. Acute CVA with left-sided hemiparesis. MRI and carotid duplex ordered. 24 Hour Events:  Improved facial droop and alertness today. Able to follow 1 step commands correctly, say \"hello\" and communicative of basic needs. Neurology remains following for acute CVA with left-sided hemiparesis. Carotid duplex completed. Awaiting MRI    No new labs for review today. PHYSICAL EXAM     Vitals Average, Min and Max for last 24 hours:  Temp: Temp: 97.7 °F (36.5 °C);  Temp  Av.2 °F (36.8 °C)  Min: 97.7 °F (36.5 °C)  Max: 98.6 °F (37 °C)  Respirations: Resp  Av  Min: 16  Max: 16  Pulse: Pulse  Av  Min: 87  Max: 112  Blood Pressure: Systolic (82KYQ), MUL:550 , Min:116 , GRM:015   ; Diastolic (64OFS), BJM:35, Min:59, Max:62  SpO2: SpO2  Av.8 %  Min: 99 %  Max: 100 %    24hr Intake/Output:   Intake/Output Summary (Last 24 hours) at 2022 1253  Last data filed at 2022 1336  Gross per 24 hour   Intake 365 ml   Output --   Net 365 ml     Vitals: BP (S) (!) 131/59   Pulse 87   Temp 97.7 °F (36.5 °C) (Oral)   Resp 16   Ht 5' 3\" (1.6 m)   Wt 110 lb (49.9 kg)   SpO2 99%   BMI 19.49 kg/m²     Physical Exam from morning rounds:  Constitutional: Eyes closed, awake but not alert. HEENT: Atraumatic. Hard of hearing. Left sided facial droop new compared to yesterday. Left sided visual threat absent. Cardiovascular: Regular rate and rhythm. Pulmonary: Breaths unlabored on room air. No wheezing, rhonchi or rales. Abdominal: Soft. Non-distended. Non-tender. Musculoskeletal: LLE with slight contracture of left knee. Post op dressings in place, c/d/I. KI in place. Neurological: awake and orientated x 1 (baseline x 3). Left sided hemiparesis. Able to follow commands on right side.     LABORATORY RESULTS (LAST 24 HOURS)     CBC with Differential:    Lab Results   Component Value Date/Time    WBC 6.4 09/01/2022 06:13 AM    RBC 2.43 09/01/2022 06:13 AM    HGB 8.5 09/01/2022 02:57 PM    HCT 24.9 09/01/2022 02:57 PM     09/01/2022 06:13 AM    MCV 87.6 09/01/2022 06:13 AM    MCH 28.6 09/01/2022 06:13 AM    MCHC 32.6 09/01/2022 06:13 AM    RDW 14.7 09/01/2022 06:13 AM    BANDSPCT 2 05/15/2022 03:45 PM    LYMPHOPCT 11.0 08/30/2022 11:58 PM    MONOPCT 7.2 08/30/2022 11:58 PM    BASOPCT 0.2 08/30/2022 11:58 PM    MONOSABS 0.6 08/30/2022 11:58 PM    LYMPHSABS 0.9 08/30/2022 11:58 PM    EOSABS 0.0 08/30/2022 11:58 PM    BASOSABS 0.0 08/30/2022 11:58 PM     CMP:    Lab Results   Component Value Date/Time     09/01/2022 06:13 AM    K 4.1 09/01/2022 06:13 AM    K 3.8 08/18/2021 06:08 AM     09/01/2022 06:13 AM    CO2 24 09/01/2022 06:13 AM    BUN 35 09/01/2022 06:13 AM    CREATININE 1.6 09/01/2022 11:25 AM    CREATININE 1.48 09/01/2022 06:13 AM    GFRAA 36 09/01/2022 11:25 AM    LABGLOM 30 09/01/2022 11:25 AM    GLUCOSE 190 09/01/2022 06:13 AM    GLUCOSE 74 06/08/2021 05:51 AM    PROT 7.0 08/29/2022 09:30 AM    LABALBU 3.7 08/29/2022 09:30 AM    CALCIUM 8.1 09/01/2022 06:13 AM    BILITOT <0.2 08/29/2022 09:30 AM    ALKPHOS 208 08/29/2022 09:30 AM    AST 26 08/29/2022 09:30 AM    ALT 12 08/29/2022 09:30 AM     Magnesium:    Lab Results   Component Value Date/Time    MG 2.0 09/01/2022 06:13 AM     PT/INR:    Lab Results   Component Value Date/Time    PROTIME 13.2 08/29/2022 09:30 AM    INR 1.0 08/29/2022 09:30 AM     PTT:    Lab Results   Component Value Date/Time    APTT 31.3 08/29/2022 09:30 AM       IMAGING RESULTS (PERSONALLY REVIEWED)     All admission and follow up imaging reviewed. ASSESSMENT & PLAN     Diagnoses:  S/p fall from standing on 8/30  Acute comminuted intra-articular left distal femur fracture  Acute pain due to trauma  Acute blood loss anemia (transfused 1UPRBC on 9/1/22)    PMHx: HTN, dementia, HLD, T2DM    Incidental Findings: None, JLR 8/30/22      ASSESSMENT/PLAN:  Neurological: acute CVA with left-sided hemiparesis, improved facial droop and alertness today. Acute pain due to trauma, Hx dementia   -Neurology follow for acute CVA with left-sided hemiparesis. Assistance appreciated   - Obtained carotid duplex   - Obtain MRI brain   - ASA 300mg   - Statin therapy   - Lipid panel and A1C  - Continue Oxycodone 5/7.5mg q4 PRN mod/severe pain  - Continue home donepezil 5mg daily, memantine 5mg BID  - Holding home mirtazapine 7.5mg HS due to lethargy  - Continue Melatonin 5mg HS    Cardiovascular: Hx HTN, afib, HLD  - Continue cardiac telemetry monitoring  - Continue vital signs per unit protocol  - Continue home ASA, carvedilol 6.25mg BID  - Change Imdur 30mg daily to Isordil 15mg BID (pharmacy confirmed dosage)     Respiratory: No acute concerns  - Maintain O2 sats > 92%, encourage IS. GI/Diet: No acute concerns  - Minced and Moist diet 4 carb choices. Crush meds in apple sauce  - Bowel regimen: Senokot BID, miralax daily PRN     Renal/Electrolytes: No electrolyte abnormalities. BUN/Cr elevated at 27/1.32-> 36/1.99-> 34/1.61-> 35/1. 48.  Appears to be patient's baseline   - HLIV  - Continue home mag oxide 40mg BID  - AM BMP     ID: No leukocytosis, afebrile  - No indication for abx     Heme: Acute blood loss anemia postoperatively transfused 1UPRBC 9/1  - HDS  - AM CBC     Endocrine: Hx T2DM  - Continue home lantus 17 Units qAM, lantus 6 units HS  - ISS AC/HS  - Glucose check AC/HS     MSK: Left distal femur fracture s/p ORIF of left femur fracture. - Spines: Clear  - Weight Bearing Restrictions: NWB LLE with KI in place at all times. - Activity: as tolerated  - PT/OT: Will consult post-op     Prophylaxis:   - SCDs, heparin 5000units BID due to kidney function  - No indication for GI PPx    Dispo: Remain on floor for continued work up of CVA. Palliative medicine consult offered to daughter at bedside today. Daughter would like to wait for MRI results to help guide prognosis. Will hold off on Palliative Medicine consult until Monday. Discussed with Medicine team and they are willing to take patient onto their service as Primary team. Trauma will sign off, please call or consult for questions or concerns. Accom Status: General Status      - Follow up with Ortho in 2 weeks. - Follow up with Neurology TBD  - No trauma follow up needed      Please call for any questions or concerns.   Mike Blas Treatment Team Sticky Note for contact information]      Ailyn Riley PA-C  Trauma/Critical Care  [see Treatment Team Sticky Note for contact information]     This patient's plan of care was discussed and made in collaboration with Trauma Attending physician, Shara Thornton MD.

## 2022-09-02 NOTE — PROGRESS NOTES
[facility]   Facility/Department: [facility]  Speech Language Pathology  Initial Speech/Language/Cognitive Assessment    NAME:Cordelia Reilly  : 1928 (89 y.o.)   [x]   confirmed    MRN: [id number]  [facility]  ADMISSION DATE: 2022  PATIENT DIAGNOSIS(ES): Closed disp comminuted fracture of shaft of right femur with malunion [S72.351P]  Other closed fracture of distal end of left femur, initial encounter [facility]) [S72.492A]  Chief Complaint   Patient presents with    Fall     Pt to the ED via EMS from [facility] with left hip pain after fall at home. Pt is alert but very [place]. Pt unable to straighten left leg and grabbing at left hip. Pt fell in SNF by tripping over a piece of furniture.       Patient Active Problem List    Diagnosis Date Noted    Closed fracture of left distal femur ([address] 2022    Cerebrovascular accident (CVA) ([address] 2022    Post-op pain 2022    Acute pain due to trauma 2022    Hyperglycemia 2021    Type 2 diabetes mellitus with hyperosmolar hyperglycemic state (HHS) ([address] 2021    Closed nondisplaced transverse fracture of right patella with routine healing 2021    Closed fracture of right distal radius and ulna     Closed nondisp transvrs fracture of right patella with routine healing 2021    Diabetes mellitus ([address]     Pacemaker     Cardiomyopathy ([address]     Ischemic cardiomyopathy 2018    CAD (coronary artery disease) 2018    Hypertension 2018    Pure hypercholesterolemia 2018     Past Medical History:   Diagnosis Date    Anemia     Atrial fibrillation (HCC)     CAD (coronary artery disease)     Cardiomyopathy ([address]     CHF (congestive heart failure) ([address]     Diabetes mellitus ([address]     Hypertension      Past Surgical History:   Procedure Laterality Date    APPENDECTOMY      CORONARY ANGIOPLASTY WITH STENT PLACEMENT      FEMUR FRACTURE SURGERY Left 2022    OPEN REDUCTION INTERNAL FIXATION LEFT DISTAL FEMORAL FRACTURE performed by Liu Pinedo MD at [facility] Right 5/13/2021    CLOSED  REDUCTION INTERNAL FIXATION RIGHT  DISTAL RADIUS PINNING performed by Lanie Sousa MD at [address] Left 12/2012    PACEMAKER INSERTION      TONSILLECTOMY      WRIST FRACTURE SURGERY Left 12/2012       DATE ONSET: 8/31/22    Date of Evaluation: 9/2/2022   Evaluating Therapist: MARIA ELENA Cunha        Diagnosis: Pt p/w moderate dysarthria and moderate-severe expressive and receptive aphasia. Dysarthria characterized by imprecise articulation, decreased breath support and impaired vocal amplitude. Pt unable to follow 1-step directions consistenty or answer yes/no questions. pt repeated some single functional words and requested/gestured for drink throughout evaluation. Pt p/w poor orientation and attention. Requires SLP Intervention: Yes     D/C Recommendations: Ongoing speech therapy is recommended during this hospitalization      General  Chart reviewed: yes  Subjective/Behavior:decreased attention/cooperation  Oxygen: 2L  Previous level of function and limitations: at nursing home  Social/Functional History  Type of Home: Facility    Vision and Hearing  Vision  Vision: Impaired  Hearing  Hearing: Exceptions to [facility]  Hearing Exceptions: Hard of hearing/hearing concerns;Bilateral hearing aid ([place] even with B HA)     Oral/Motor  Oral Motor   Labial: Left droop; Impaired coordination  Dentition: Natural  Oral Hygiene: Moist;Clean  Lingual: Decreased strength; Incoordinated;Left deviation (decreased ROM)  Mandible: No impairment  Gag: No Impairment    Motor Speech  Motor Speech  Apraxic Characteristics: None  Dysarthric Characteristics: Blended word boundaries  Intelligibility: Impaired  Overall Impairment Severity: Moderate    Comprehension  Auditory Comprehension  Comprehension: Exceptions  One Step Commands:  Moderate (pt followed 1-step commands during BSE with moderate cueing and repetition)  Two Step Commands: Severe  Multistep Commands: Severe  Conversation: Severe    Expression  Expression  Primary Mode of Expression: Verbal  Verbal Expression  Verbal Expression: Exceptions to functional limits  Repetition: Moderate (repeated words after clinician during BSE and repeated name with moderate cues)  Confrontation:  (unable to assess)  Convergent:  (unable to assess)  Divergent:  (unable to assess)  Conversation: Severe    Cognition  Orientation  Overall Orientation Status: Impaired  Orientation Level: Oriented to person; Other (comment) (pt oriented to name after use of yes/no questions from SLP, unable to orient to place, time or situation)  Safety/Judgment  Safety/Judgment: Exceptions to [facility]  Unable to Self-monitor and Self-correct Consistently: Severe (pt reaching out side of bed after SLP pushed table away from bed)  Insight: Severe    Impulsive: Moderate (pt reaching and grabbing for cup during BSE to attempt to gulp liquid)    Additional Assessments       Recommendations  Requires SLP Intervention: Yes  D/C Recommendations: Ongoing speech therapy is recommended during this hospitalization  Patient Education: pt educated on results of SLE  Patient Education Response: Needs reinforcement  Duration of Treatment: during LOS or until goals met  Frequency of Treatment: 2-3x/week    Prognosis  Speech Therapy Prognosis  Prognosis: Good  Prognosis Considerations: Previous Level of Function    Education  Individuals consulted  Consulted and agree with results and recommendations: Patient;RN  RN Name: Richar    Treatment/Goals  Short-term Goals  Timeframe for Short-term Goals: 2 weeks  Goal 1: Pt will complete labial and lingual exercises 10x/each to promote strength and ROM for improved speech intelligibility for expression of complex thoughts, needs, feelings, and ideas.   Goal 2: Pt will demonstrate use of compensatory strategies that promote speech intelligibility in 4/5 given opportunities during structured tasks with SLP so that they may functionally communicate and express safety/medical concerns across all environments. Goal 3: Pt will follow 1 step directions given orally with 80% accuracy with min cues to increase the pt's ability to follow directions provided by caregivers for safe follow through with ADLs. Goal 4: Pt will answer simple level yes/no questions with 80% accuracy with min cues to assist the caregiver in obtaining important information regarding the patient's personal, medical, and safety needs. Goal 5: Pt will name 10/10 items in a concrete category with min verbal cues to promote semantic organization, neuroplasticity, and the efficiency of word finding for expression of the patient's wants, needs, feelings, and ideas. Long-term Goals  Timeframe for Long-term Goals: 1-2 weeks  Goal 1: Pt will improve his/her Speech Intelligibility to sentence level for effective communication of wants, needs, feelings, ideas, and medical/safety information with familiar and unfamiliar listeners. Goal 2: Pt will improve his/her Expressive Language Abilities to a min assist level for effective communication with familiar and unfamiliar communication partners so they may functionally communicate and express safety/medical concerns. Patient's goals: unable to determine    Safety Devices  Safety Devices  Safety Devices in place: Yes  Type of devices: Bed alarm in place;Call light within reach    Pain Assessment  Patient does not appear in pain. Pain Re-assessment  Patient does not appear in pain.          Therapy Time  SLP Individual Minutes  Time In: 3802  Time Out: 1219  Minutes: 11                 Signature: Electronically signed by MARIA ELENA Monet on 9/2/2022 at 11:15 AM

## 2022-09-03 ENCOUNTER — APPOINTMENT (OUTPATIENT)
Dept: CT IMAGING | Age: 87
DRG: 480 | End: 2022-09-03
Payer: MEDICARE

## 2022-09-03 VITALS
SYSTOLIC BLOOD PRESSURE: 127 MMHG | HEIGHT: 63 IN | TEMPERATURE: 98.1 F | BODY MASS INDEX: 19.49 KG/M2 | OXYGEN SATURATION: 100 % | RESPIRATION RATE: 16 BRPM | WEIGHT: 110 LBS | DIASTOLIC BLOOD PRESSURE: 48 MMHG | HEART RATE: 63 BPM

## 2022-09-03 LAB
ANION GAP SERPL CALCULATED.3IONS-SCNC: 9 MEQ/L (ref 9–15)
BASOPHILS ABSOLUTE: 0 K/UL (ref 0–0.2)
BASOPHILS RELATIVE PERCENT: 0.4 %
BUN BLDV-MCNC: 36 MG/DL (ref 8–23)
CALCIUM SERPL-MCNC: 7.7 MG/DL (ref 8.5–9.9)
CHLORIDE BLD-SCNC: 102 MEQ/L (ref 95–107)
CO2: 24 MEQ/L (ref 20–31)
CREAT SERPL-MCNC: 1.26 MG/DL (ref 0.5–0.9)
EOSINOPHILS ABSOLUTE: 0 K/UL (ref 0–0.7)
EOSINOPHILS RELATIVE PERCENT: 0.3 %
GFR AFRICAN AMERICAN: 47.8
GFR NON-AFRICAN AMERICAN: 39.5
GLUCOSE BLD-MCNC: 121 MG/DL (ref 70–99)
GLUCOSE BLD-MCNC: 144 MG/DL (ref 70–99)
GLUCOSE BLD-MCNC: 262 MG/DL (ref 70–99)
GLUCOSE BLD-MCNC: 268 MG/DL (ref 70–99)
GLUCOSE BLD-MCNC: 300 MG/DL (ref 70–99)
GLUCOSE BLD-MCNC: 64 MG/DL (ref 70–99)
HCT VFR BLD CALC: 22.9 % (ref 37–47)
HEMOGLOBIN: 7.6 G/DL (ref 12–16)
LYMPHOCYTES ABSOLUTE: 1.2 K/UL (ref 1–4.8)
LYMPHOCYTES RELATIVE PERCENT: 17.1 %
MCH RBC QN AUTO: 29.5 PG (ref 27–31.3)
MCHC RBC AUTO-ENTMCNC: 33.4 % (ref 33–37)
MCV RBC AUTO: 88.3 FL (ref 82–100)
MONOCYTES ABSOLUTE: 0.6 K/UL (ref 0.2–0.8)
MONOCYTES RELATIVE PERCENT: 8.9 %
NEUTROPHILS ABSOLUTE: 5 K/UL (ref 1.4–6.5)
NEUTROPHILS RELATIVE PERCENT: 73.3 %
PDW BLD-RTO: 14.7 % (ref 11.5–14.5)
PERFORMED ON: ABNORMAL
PLATELET # BLD: 147 K/UL (ref 130–400)
POTASSIUM REFLEX MAGNESIUM: 5.9 MEQ/L (ref 3.4–4.9)
POTASSIUM SERPL-SCNC: 4.2 MEQ/L (ref 3.4–4.9)
RBC # BLD: 2.59 M/UL (ref 4.2–5.4)
SODIUM BLD-SCNC: 135 MEQ/L (ref 135–144)
WBC # BLD: 6.8 K/UL (ref 4.8–10.8)

## 2022-09-03 PROCEDURE — 36415 COLL VENOUS BLD VENIPUNCTURE: CPT

## 2022-09-03 PROCEDURE — 1210000000 HC MED SURG R&B

## 2022-09-03 PROCEDURE — 70450 CT HEAD/BRAIN W/O DYE: CPT

## 2022-09-03 PROCEDURE — 2700000000 HC OXYGEN THERAPY PER DAY

## 2022-09-03 PROCEDURE — 6370000000 HC RX 637 (ALT 250 FOR IP): Performed by: STUDENT IN AN ORGANIZED HEALTH CARE EDUCATION/TRAINING PROGRAM

## 2022-09-03 PROCEDURE — 6360000002 HC RX W HCPCS: Performed by: PHYSICIAN ASSISTANT

## 2022-09-03 PROCEDURE — 84132 ASSAY OF SERUM POTASSIUM: CPT

## 2022-09-03 PROCEDURE — 80048 BASIC METABOLIC PNL TOTAL CA: CPT

## 2022-09-03 PROCEDURE — 2580000003 HC RX 258: Performed by: PHYSICIAN ASSISTANT

## 2022-09-03 PROCEDURE — 97535 SELF CARE MNGMENT TRAINING: CPT

## 2022-09-03 PROCEDURE — 85025 COMPLETE CBC W/AUTO DIFF WBC: CPT

## 2022-09-03 PROCEDURE — 99232 SBSQ HOSP IP/OBS MODERATE 35: CPT | Performed by: NURSE PRACTITIONER

## 2022-09-03 PROCEDURE — 6370000000 HC RX 637 (ALT 250 FOR IP): Performed by: PHYSICIAN ASSISTANT

## 2022-09-03 PROCEDURE — 97112 NEUROMUSCULAR REEDUCATION: CPT

## 2022-09-03 PROCEDURE — 6370000000 HC RX 637 (ALT 250 FOR IP): Performed by: NURSE PRACTITIONER

## 2022-09-03 RX ORDER — LORAZEPAM 0.5 MG/1
0.5 TABLET ORAL ONCE
Status: DISCONTINUED | OUTPATIENT
Start: 2022-09-03 | End: 2022-09-04 | Stop reason: HOSPADM

## 2022-09-03 RX ORDER — ATORVASTATIN CALCIUM 80 MG/1
80 TABLET, FILM COATED ORAL NIGHTLY
Qty: 30 TABLET | Refills: 3
Start: 2022-09-03

## 2022-09-03 RX ADMIN — Medication 10 ML: at 08:00

## 2022-09-03 RX ADMIN — HEPARIN SODIUM 5000 UNITS: 5000 INJECTION INTRAVENOUS; SUBCUTANEOUS at 12:58

## 2022-09-03 RX ADMIN — MEMANTINE 5 MG: 5 TABLET ORAL at 21:09

## 2022-09-03 RX ADMIN — ISOSORBIDE DINITRATE 15 MG: 10 TABLET ORAL at 21:09

## 2022-09-03 RX ADMIN — MEMANTINE 5 MG: 5 TABLET ORAL at 10:35

## 2022-09-03 RX ADMIN — CARVEDILOL 6.25 MG: 3.12 TABLET, FILM COATED ORAL at 21:09

## 2022-09-03 RX ADMIN — ASPIRIN 300 MG: 300 SUPPOSITORY RECTAL at 12:44

## 2022-09-03 RX ADMIN — DONEPEZIL HYDROCHLORIDE 5 MG: 5 TABLET, FILM COATED ORAL at 21:09

## 2022-09-03 RX ADMIN — INSULIN GLARGINE 17 UNITS: 100 INJECTION, SOLUTION SUBCUTANEOUS at 10:37

## 2022-09-03 RX ADMIN — Medication 10 ML: at 21:21

## 2022-09-03 RX ADMIN — ISOSORBIDE DINITRATE 15 MG: 10 TABLET ORAL at 10:35

## 2022-09-03 RX ADMIN — HEPARIN SODIUM 5000 UNITS: 5000 INJECTION INTRAVENOUS; SUBCUTANEOUS at 21:08

## 2022-09-03 RX ADMIN — CARVEDILOL 6.25 MG: 3.12 TABLET, FILM COATED ORAL at 10:36

## 2022-09-03 RX ADMIN — Medication 400 MG: at 10:35

## 2022-09-03 RX ADMIN — Medication 400 MG: at 21:10

## 2022-09-03 RX ADMIN — SENNOSIDES AND DOCUSATE SODIUM 1 TABLET: 50; 8.6 TABLET ORAL at 21:10

## 2022-09-03 RX ADMIN — INSULIN GLARGINE 6 UNITS: 100 INJECTION, SOLUTION SUBCUTANEOUS at 21:28

## 2022-09-03 RX ADMIN — HEPARIN SODIUM 5000 UNITS: 5000 INJECTION INTRAVENOUS; SUBCUTANEOUS at 05:09

## 2022-09-03 RX ADMIN — ATORVASTATIN CALCIUM 80 MG: 80 TABLET, FILM COATED ORAL at 21:10

## 2022-09-03 RX ADMIN — INSULIN LISPRO 6 UNITS: 100 INJECTION, SOLUTION INTRAVENOUS; SUBCUTANEOUS at 12:59

## 2022-09-03 RX ADMIN — Medication 5 MG: at 21:09

## 2022-09-03 ASSESSMENT — ENCOUNTER SYMPTOMS
COUGH: 0
COLOR CHANGE: 0
WHEEZING: 0
TROUBLE SWALLOWING: 1
VOMITING: 0
ABDOMINAL DISTENTION: 0

## 2022-09-03 ASSESSMENT — PAIN SCALES - WONG BAKER: WONGBAKER_NUMERICALRESPONSE: 0

## 2022-09-03 ASSESSMENT — PAIN SCALES - GENERAL: PAINLEVEL_OUTOF10: 0

## 2022-09-03 NOTE — PROGRESS NOTES
Physical Therapy Med Surg Daily Treatment Note  [facility]  [place] [facility] [place]       NAME: Mihai Levine  : 1928 (89 y.o.)  MRN: [id number]  CODE STATUS: DNR-CCA    Date of Service: 9/3/2022    Patient Diagnosis(es): Closed disp comminuted fracture of shaft of right femur with malunion [S72.351P]  Other closed fracture of distal end of left femur, initial encounter [facility]) [S72.492A]   Chief Complaint   Patient presents with    Fall     Pt to the ED via EMS from [facility] with left hip pain after fall at home. Pt is alert but very [place]. Pt unable to straighten left leg and grabbing at left hip. Pt fell in SNF by tripping over a piece of furniture.       Patient Active Problem List    Diagnosis Date Noted    Closed fracture of left distal femur ([facility] 2022    Cerebrovascular accident (CVA) ([address] 2022    Post-op pain 2022    Acute pain due to trauma 2022    Hyperglycemia 2021    Type 2 diabetes mellitus with hyperosmolar hyperglycemic state (HHS) ([address] 2021    Closed nondisplaced transverse fracture of right patella with routine healing 2021    Closed fracture of right distal radius and ulna     Closed nondisp transvrs fracture of right patella with routine healing 2021    Diabetes mellitus ([address]     Pacemaker     Cardiomyopathy ([address]     Ischemic cardiomyopathy 2018    CAD (coronary artery disease) 2018    Hypertension 2018    Pure hypercholesterolemia 2018        Past Medical History:   Diagnosis Date    Anemia     Atrial fibrillation (HCC)     CAD (coronary artery disease)     Cardiomyopathy ([address]     CHF (congestive heart failure) ([address]     Diabetes mellitus ([address]     Hypertension      Past Surgical History:   Procedure Laterality Date    APPENDECTOMY      CORONARY ANGIOPLASTY WITH STENT PLACEMENT      FEMUR FRACTURE SURGERY Left 2022    OPEN REDUCTION INTERNAL FIXATION LEFT DISTAL FEMORAL FRACTURE performed by Jatin Joyce MD at [facility] Right 5/13/2021    CLOSED  REDUCTION INTERNAL FIXATION RIGHT  DISTAL RADIUS PINNING performed by Mikaela Salinas MD at [email or web address] Left 12/2012    PACEMAKER INSERTION      TONSILLECTOMY      WRIST FRACTURE SURGERY Left 12/2012       Chart Reviewed: Yes    Restrictions:  Restrictions/Precautions: Weight Bearing; Fall Risk  Lower Extremity Weight Bearing Restrictions  Left Lower Extremity Weight Bearing: Non Weight Bearing  Required Braces or Orthoses  Left Lower Extremity Brace: Knee Immobilizer (at all times except for hygiene- no knee ROM)    SUBJECTIVE:   Subjective: Pt minimally verbal but nods head yes in agreement to tx    Pain  Pain: Facial grimacing with movement     OBJECTIVE:   Orientation  Overall Orientation Status: Impaired  Orientation Level: Oriented X4  Cognition  Arousal/Alertness: Delayed responses to stimuli  Following Commands: Inconsistently follows commands  Initiation: Requires cues for all  Sequencing: Requires cues for all  Cognition Comment: Minimal verbalizations    Bed mobility  Supine to Sit: Dependent/Total  Sit to Supine: Dependent/Total  Bed Mobility Comments: Pt no initaition of movement, hand over hand assist for all mobility. Once sitting EOB pt is moving arms and attempts to hold midline but is unable d/t fatigue. Pt tolerated sitting EOB 10+ mins. with Mod A. UE utilized to adjust her hair. PT Exercises  Dynamic Sitting Balance Exercises: A/P and lateral trunk rocking with vc's for abdominal bracing. Pt attempting with adjust. Fatigues quickly. Activity Tolerance  Activity Tolerance: Treatment limited secondary to medical complications; Patient limited by fatigue          ASSESSMENT   Assessment: Pt sat EOB with seated balance activity. Pt attempts to participate however fatigues quickly. Mod A to maintain balance sitting EOB.  Pt attempts to adjust her hair while sitting up. \"I'm tired\" \"I'm cold and tired. \"     Discharge Recommendations:  Patient would benefit from continued therapy after discharge, Therapy recommended at discharge         Goals  Long Term Goals  Long term goal 1: Rolling with Min A to decrease caregiver burden  Long term goal 2: Supine <>sit with Mod A to promote upright posture  Long term goal 3: Pt will sit unsupported with SBA x 6 min  Long term goal 4: Pt will be SBA for HEP to improve R LE strength and coordination  Long term goal 5: progress to functional transfers with Max A to promote OOB activity  Patient Goals   Patient goals : unable to state    PLAN    Plan: 1 time a day 7 days a week  Safety Devices  Type of Devices: All fall risk precautions in place, Call light within reach, Bed alarm in place, Left in bed, Nurse notified     [facility] (6 CLICK) [address] Mobility Raw Score : 6      Therapy Time   Individual   Time In 1109   Time Out 1133   Minutes 24      Bm/Trsf - 10 mins  NMR - 14 mins       Jenny SpeakerDEVONTE, 09/03/22 at 11:43 AM         Definitions for assistance levels  Independent = pt does not require any physical supervision or assistance from another person for activity completion. Device may be needed.   Stand by assistance = pt requires verbal cues or instructions from another person, close to but not touching, to perform the activity  Minimal assistance= pt performs 75% or more of the activity; assistance is required to complete the activity  Moderate assistance= pt performs 50% of the activity; assistance is required to complete the activity  Maximal assistance = pt performs 25% of the activity; assistance is required to complete the activity  Dependent = pt requires total physical assistance to accomplish the task

## 2022-09-03 NOTE — FLOWSHEET NOTE
9/3/22 @ [address] Notified [facility] answering service of Cardiology consult # [id number]

## 2022-09-03 NOTE — PLAN OF CARE
Nutrition Problem #1: Inadequate oral intake  Intervention: Food and/or Nutrient Delivery: Continue Current Diet, Start Oral Nutrition Supplement, Start Tube Feeding  Nutritional    Po > 50%, honor goals of care

## 2022-09-03 NOTE — CARE COORDINATION
Patient had a medical status change, MRI pending. Patient can return to [facility] once medically stable, cleared, [id number] completed. LSW/CM to follow for any new d/c needs or changes to the d/c plan.

## 2022-09-03 NOTE — CARE COORDINATION
Patient medically cleared to go to [facility]. Transport arranged for 7pm pickup. Nursing updated. Family notified by nursing. Transport form to .

## 2022-09-03 NOTE — CARDIO/PULMONARY
[address] [facility] NOTE:    Asked for MRI Pacemaker Compatibility of Patient Pacemaker. Implanted 5/16 [facility]. Get [address] [facility] Pacemaker records    Have reviewed our office notes and he has properly functioning Pacemaker. Lucila Giles MRI COMPATIBLE SYSTEM SO GOOD FOR PROPOSED MRI STUDY. Set To usual rate 70 VOO setting during during MRI and Reset back after. Dr Miya Cutler is out of town currently. Call if Questions.       Eliseo Toro MD   [id number]

## 2022-09-03 NOTE — DISCHARGE SUMMARY
Hospital Medicine Discharge Summary    Chandler Esparza  :  1928  MRN:  [id number]    Admit date:  2022  Discharge date:  9/3/2022    Admitting Physician:  Rose Marie Mcdowell MD  Primary Care Physician:  Brandi Martin MD      Discharge Diagnoses:      left-sided distal femur fracture status post ORIF of the left femur  Acute stroke with left-sided hemiparesis  Dysphagia modified diet  Acute posthemorrhagic anemia-postop required transfusion, now better  HTN  A. fib on aspirin, poor candidate for high-dose therapeutic anticoagulation  HLD  Debility  Gait instability  Type 2 diabetes on insulin    Chief Complaint   Patient presents with    Fall     Pt to the ED via EMS from [facility] with left hip pain after fall at home. Pt is alert but very [place]. Pt unable to straighten left leg and grabbing at left hip. Pt fell in SNF by tripping over a piece of furniture. Hospital Course:     left-sided distal femur fracture status post ORIF of the left femur  Acute stroke with left-sided hemiparesis  Dysphagia modified diet  Acute posthemorrhagic anemia-postop required transfusion, now better  HTN  A. fib on aspirin, poor candidate for high-dose therapeutic anticoagulation  HLD  Debility  Gait instability  Type 2 diabetes on insulin    Patient was admitted to trauma service, I had the first encounter with the patient today. Please refer to trauma service notes for details. Trauma service asked me to take over for further medical management but apparently the patient was ready for discharge on the day I picked her up.     Exam on discharge:   BP (!) 127/48   Pulse 63   Temp 98.1 °F (36.7 °C) (Oral)   Resp 16   Ht 5' 3\" (1.6 m)   Wt 110 lb (49.9 kg)   SpO2 100%   BMI 19.49 kg/m²         Patient was seen by the following consultants   Consults:  IP CONSULT TO ORTHOPEDIC SURGERY  IP CONSULT TO CASE MANAGEMENT  IP CONSULT TO SOCIAL WORK  IP CONSULT TO NEUROLOGY  IP CONSULT TO CARDIOLOGY    Significant Diagnostic Studies:    Refer to chart     Please refer to chart if no studies are shown here    XR FEMUR LEFT (MIN 2 VIEWS)    Result Date: 8/29/2022  EXAMINATION:  RADIOGRAPH LEFT FEMUR CLINICAL HISTORY:  TRAUMA COMPARISONS:  Left knee radiograph obtained the same date TECHNIQUE:  Frontal and lateral views left femur. FINDINGS:  Left femoral head is not optimally delineated. Metallic surgical hardware stabilizes the proximal left femur. Metallic pins and cerclage wires are noted about the left patella. A severely comminuted fracture involves the supracondylar portion of the distal left femur. It is difficult to exclude fracture extension to the articular cortex of either femoral condyle. These findings were also described on the separate report for the  left knee radiograph Available images demonstrate no dislocation or destructive osseous abnormality. 1. INTERNAL FIXATION OF PROXIMAL LEFT FEMUR AND OF PATELLA. 2. COMMINUTED SUPRACONDYLAR FRACTURE DISTAL LEFT FEMUR, AS DISCUSSED     XR KNEE LEFT (MIN 4 VIEWS)    Result Date: 8/29/2022  EXAMINATION:  RADIOGRAPH LEFT KNEE CLINICAL HISTORY:  TRAUMA. COMPARISONS:  4/3/2014 TECHNIQUE:  Frontal, oblique, and lateral views left knee. FINDINGS:  There is moderate osteopenia. Surgical hardware is partially visualized within the left femur above the knee. Surgical pins and cerclage wires stabilize the left patella. A severely comminuted fracture involves the supracondylar portion of the distal left femur. It is difficult to entirely exclude extension of fracture to the articular cortex of either femoral condyle. There is no convincing evidence of fracture of the proximal tibia. There is no dislocation nor destructive osseous lesion. Vascular calcifications incidentally noted. 1. OSTEOPENIA. 2. INTERNAL FIXATION PATELLA AND FEMUR. 3. SEVERELY COMMINUTED SUPRACONDYLAR FRACTURE OF THE DISTAL LEFT FEMUR.  EXTENSION TO THE ARTICULAR CORTEX OF EITHER FEMORAL CONDYLE IS patellar fracture is otherwise unremarkable. There is no other fracture, dislocation, significant degenerative changes, abnormal radiodense foreign bodies, worrisome bone destruction, or organized hematoma identified. COMMINUTED INTRA-ARTICULAR DISTAL LEFT FEMUR FRACTURES. CT 3D RECONSTRUCTION    Result Date: 8/29/2022  CT FEMUR LEFT WO CONTRAST, CT 3D RECONSTRUCTION : 8/29/2022 CLINICAL HISTORY: Distal left femur fracture. Thin slices and 3d reconstruction . COMPARISON: Left femur radiographs from earlier 8/29/2022. TECHNIQUE: Spiral imaging was obtained from above the left hip through the left knee, with routine multiplanar reconstructions and volume rendered images obtained on a 3-D workstation, as requested. All CT scans at this facility use dose modulation, iterative reconstruction, and/or weight based dosing when appropriate to reduce radiation dose to as low as reasonably achievable. FINDINGS: A comminuted bicondylar distal femur fracture and oblique fracture of the distal metadiaphysis is present, with approximately 1.5 cm impaction and lateral displacement of the metadiaphyseal fracture fragments, and approximately 3 mm of maximum displacement of the intracondylar fragments. A proximal left femoral nail and healed proximal femur fracture is otherwise unremarkable. Screw and wire fixation of a healed patellar fracture is otherwise unremarkable. There is no other fracture, dislocation, significant degenerative changes, abnormal radiodense foreign bodies, worrisome bone destruction, or organized hematoma identified. COMMINUTED INTRA-ARTICULAR DISTAL LEFT FEMUR FRACTURES. FLUORO FOR SURGICAL PROCEDURES    Result Date: 8/31/2022  EXAMINATION:  FLUOROSCOPY FOR SURGICAL PROCEDURE CLINICAL HISTORY:  FRACTURE. COMPARISONS:  CT 8/29/2022. TECHNIQUE:  Fluoroscopy was utilized for surgical guidance. Radiologist was not present during the examination. 33 seconds of fluoroscopy time were utilized.  6 fluoroscopic spot images were obtained. FINDINGS:  Spot images demonstrate internal fixation reduction of femoral fracture with metallic surgical hardware. AS ABOVE. PLEASE ALSO REFER TO SEPARATE OPERATIVE OR SURGICAL REPORT. XR HIP LEFT MIN 4VW W PELVIS    Result Date: 8/29/2022  PROCEDURE: X-ray left hip. INDICATION: 59-year-old female presenting with severe left lower extremity pain. COMPARISON: 5/15/2022. FINDINGS: Unremarkable alignment of the left internal fixation prosthesis, no evidence of lucency surrounding the prosthesis. No evidence of cortical irregularity or lucency to suggest a fracture, no evidence of lytic or sclerotic bone lesion is seen. Bone demineralization and Degenerative bone changes are seen. Vascular calcifications seen. The overlying soft tissues are unremarkable. Degenerative bone changes. No evidence of acute osseous pathology. Discharge Medications:         Medication List        START taking these medications      atorvastatin 80 MG tablet  Commonly known as: LIPITOR  Take 1 tablet by mouth nightly     enoxaparin Sodium 30 MG/0.3ML injection  Commonly known as: LOVENOX  Inject 0.3 mLs into the skin 2 times daily for 28 days     methocarbamol 500 MG tablet  Commonly known as: ROBAXIN  Take 1 tablet by mouth in the morning and 1 tablet at noon and 1 tablet in the evening and 1 tablet before bedtime. Do all this for 10 days. oxyCODONE 5 MG immediate release tablet  Commonly known as: ROXICODONE  Take 1 tablet by mouth every 6 hours as needed (severe pain) for up to 7 days.      sennosides-docusate sodium 8.6-50 MG tablet  Commonly known as: SENOKOT-S  Take 1 tablet by mouth in the morning and at bedtime for 14 days            CONTINUE taking these medications      aspirin 81 MG EC tablet  Take 1 tablet by mouth daily     carvedilol 6.25 MG tablet  Commonly known as: COREG  Take 1 tablet by mouth 2 times daily     donepezil 5 MG tablet  Commonly known as: ARICEPT     * insulin glargine 100 UNIT/ML injection vial  Commonly known as: LANTUS     * insulin glargine 100 UNIT/ML injection vial  Commonly known as: LANTUS  Inject 17 Units into the skin daily     insulin lispro 100 UNIT/ML injection vial  Commonly known as: HUMALOG     isosorbide mononitrate 30 MG extended release tablet  Commonly known as: IMDUR  Take 1 tablet by mouth daily     Magnesium Oxide 250 MG Tabs tablet  Commonly known as: MAGNESIUM-OXIDE     memantine 5 MG tablet  Commonly known as: NAMENDA     mirtazapine 15 MG tablet  Commonly known as: REMERON     SITagliptin 100 MG tablet  Commonly known as: Tadeo Ervin           * This list has 2 medication(s) that are the same as other medications prescribed for you. Read the directions carefully, and ask your doctor or other care provider to review them with you. STOP taking these medications      glucose 40 % Gel  Commonly known as: GLUTOSE     potassium chloride 10 MEQ extended release tablet  Commonly known as: KLOR-CON M               Where to Get Your Medications        These medications were sent to [address] [place] [address]      Phone: [phone]   enoxaparin Sodium 30 MG/0.3ML injection  insulin glargine 100 UNIT/ML injection vial  methocarbamol 500 MG tablet  oxyCODONE 5 MG immediate release tablet  sennosides-docusate sodium 8.6-50 MG tablet       Information about where to get these medications is not yet available    Ask your nurse or doctor about these medications  atorvastatin 80 MG tablet         Disposition:   If discharged to Home, Any [place] [id number] needs that were indicated and/or required as been addressed and set up by Social Work. Condition at discharge: good     Activity:     Total time taken for discharging this patient: 40 minutes. Greater than 70% of time was spent focused exclusively on this patient.  Time was taken to review chart, discuss plans with consultants, reconciling medications, discussing plan answering questions with patient.      Garcia Pineda DO  9/3/2022, 6:10 PM  ----------------------------------------------------------------------------------------------------------------------    [employer],

## 2022-09-03 NOTE — PROGRESS NOTES
Comprehensive Nutrition Assessment    Type and Reason for Visit:  Initial (age, dx, BMI)    Nutrition Recommendations/Plan:   Continue with Carb Control Minced and moist diet  Begin Diabetic oral supplement B & D, fortified pudding D @  May need placement of enteral feeding device, id in alignment with goals of care for continued intakes < 25%     Malnutrition Assessment:  Malnutrition Status: At risk for malnutrition (Comment) (09/03/22 1411)    Context:  Social/Environmental Circumstances     Findings of the 6 clinical characteristics of malnutrition:  Energy Intake:  Mild decrease in energy intake (Comment)  Weight Loss:  Unable to assess     Body Fat Loss:  Unable to assess     Muscle Mass Loss:  Unable to assess    Fluid Accumulation:  No significant fluid accumulation     Strength:  Not Performed    Nutrition Assessment:    Pt at high risk for malnutrition, admitted for femur fx, s/p surgical repair, now with new CVA, Asleep during breakfast and lunch when visited, will begin oral nutrition supplements, noted DNR CCA status, may need placment of enteral feeding device if in alingment with goals of care    Nutrition Related Findings:    Admitted s/p fall at SNF, s/p repair of femur fx, now with neurological changes, L sided neglect, BSE 9/2 noted, asleep during B & L , no intake reported, confused and [place] per notes, Hx includes :CHF, DM, HTN, CAD, glucose 194-300, meds reviewed, last BM 9/2 Wound Type: Surgical Incision       Current Nutrition Intake & Therapies:    Average Meal Intake: 0%  Average Supplements Intake: None Ordered  ADULT DIET; Dysphagia - Minced and Moist; 4 carb choices (60 gm/meal)  ADULT ORAL NUTRITION SUPPLEMENT; Breakfast, Dinner; Diabetic Oral Supplement  ADULT ORAL NUTRITION SUPPLEMENT; Lunch;  Fortified Pudding Oral Supplement    Anthropometric Measures:  Height: 5' 3\" (160 cm)  Ideal Body Weight (IBW): 115 lbs (52 kg)    Admission Body Weight: 110 lb (49.9 kg) (stated)  Current Body Weight: 126 lb 8 oz (57.4 kg) (9/3 multiple blankets),   UTD IBW. Weight Source: Bed Scale  Current BMI (kg/m2): 22.4  Usual Body Weight: 100 lb (45.4 kg) ((8/2021), 110# ( 4/22))  % Weight Change (Calculated): 26.5  Weight Adjustment For: No Adjustment                 BMI Categories: Underweight (BMI less than 22) age over 72    Estimated Daily Nutrient Needs:  Energy Requirements Based On: Kcal/kg  Weight Used for Energy Requirements: Admission  Energy (kcal/day): ~ 1400 kcals @ 28 kcal/kg  Weight Used for Protein Requirements: Admission  Protein (g/day): 60 g protein @ 1.2 g/kg  Method Used for Fluid Requirements: 1 ml/kcal  Fluid (ml/day): ~1600    Nutrition Diagnosis:   Inadequate oral intake related to cognitive or neurological impairment as evidenced by intake 0-25%    Nutrition Interventions:   Food and/or Nutrient Delivery: Continue Current Diet, Start Oral Nutrition Supplement, Start Tube Feeding  Nutrition Education/Counseling: Education not indicated  Coordination of Nutrition Care: No recommendation at this time       Goals:     Goals: PO intake 50% or greater, by next RD assessment       Nutrition Monitoring and Evaluation:   Behavioral-Environmental Outcomes: None Identified  Food/Nutrient Intake Outcomes: Food and Nutrient Intake, Supplement Intake, Diet Advancement/Tolerance  Physical Signs/Symptoms Outcomes: Chewing or Swallowing, Weight, Meal Time Behavior    Discharge Planning:     Too soon to determine     Medhat Xie RD, LD

## 2022-09-03 NOTE — FLOWSHEET NOTE
Pt was to have MRI Unable to due until we get MRI formed filled out with Cardiologist signs paper then they have to get a hold of the Rep who will come in and program the machine then get a RN to stand down there incase they need her and that  won't happen until Tue or Wed. Suggest pt go back to nursing home and schedule her as out pt Tue or Wed . I  Let Darío Franco  know and Amanda Charlton know who is going to canceled  the MRI and is going to order CT instead. Will notify family to let them know. Electronically signed by Dianna Vargas LPN on 8/9/9899 at 80:47 PM  Daughter aware of change MRI to CT wants us to let her know if she goes back to Nursing home today. emergent status. Electronically signed by Dianna Vargas LPN on 3/1/7627 at 09:61 PM  Pt returned from CT scan Cold extra warm blankets given. Pt has a visitor who wants some questions answered but due to no LUIS Code told her to call the daughters if she wants information. Pt resting quietly. Electronically signed by Dianna Vargas LPN on 0/3/4958 at 4:77 PM

## 2022-09-03 NOTE — PROGRESS NOTES
Internal Medicine   Hospitalist   Progress Note    9/3/2022   1:07 PM    Name:  Mihai Levine  MRN:    [id number]     [address] Day: 5     Admit Date: 8/29/2022  8:45 AM  PCP: Kimmy Henao MD    Code Status:  DNR-CCA    Assessment and Plan: Active Problems/ diagnosis:     left-sided distal femur fracture status post ORIF of the left femur  Acute stroke with left-sided hemiparesis  Dysphagia modified diet  Acute posthemorrhagic anemia-postop required transfusion, now better  HTN  A. fib on aspirin, poor candidate for high-dose therapeutic anticoagulation  HLD  Debility  Gait instability  Type 2 diabetes on insulin    Plan  I assumed care of the patient today after being asked by trauma team to take over as they are no longer performing any trauma interventions. I was not involved in the patient care prior to today. Discussed patient with neurology team, resume aspirin, statin. Patient will have repeat CT scan as MRI is not available for 3 to 4 days, neurologist suggested obtaining CT scan rather and continuing with the same treatment plan. Further stroke management as per neurology team.  Orthopedic surgery is following up with the patient as outpatient, patient is tolerating Lovenox 30 mg twice daily for postoperative DVT prophylaxis. Hemoglobin is stable. Sliding scale insulin, hypoglycemia protocol, Lantus, Premeal insulin  Home medication resumed as appropriate  Discussed plan of care with patient's nurse  Discussed plan of care with patient's 2 daughter including Shannon Levy who is the patient's primary caretaker. Patient is DNR CCA  Okay to discharge to skilled nursing facility once okay with neurology team.    DVT PPx     7 pm- 7 am, please contact on call Hospitalist for any needs     Subjective:      no new events. No new symptoms.     Physical Examination:      Vitals:  BP (!) 127/48   Pulse 63   Temp 98.1 °F (36.7 °C) (Oral)   Resp 16   Ht 5' 3\" (1.6 m)   Wt 110 lb (49.9 kg)   SpO2 100%   BMI 19.49 kg/m²   Temp (24hrs), Av.7 °F (36.5 °C), Min:97.5 °F (36.4 °C), Max:98.1 °F (36.7 °C)      General appearance: No acute distress  Mental Status: Not answering question but opens her eye and voices few words, confused. Sleepy. Lungs: clear to auscultation bilaterally, normal effort  Heart: regular rate and rhythm, no murmur  Abdomen: soft, nontender, nondistended, bowel sounds present, no masses  Extremities: no edema, redness, tenderness in the calves  Skin: no gross lesions, rashes  Neurologically left-sided hemiparesis, strength intact on the right side    Data:     Labs:  Recent Labs     22  1457 22  0520   WBC 6.4  --   --  6.8   HGB 6.9*   < > 8.5* 7.6*   *  --   --  147    < > = values in this interval not displayed. Recent Labs     22  0520 22  0721     --  135  --    K 4.1  --  5.9* 4.2     --  102  --    CO2 24  --  24  --    BUN 35*  --  36*  --    CREATININE 1.48* 1.6* 1.26*  --    GLUCOSE 190*  --  268*  --      No results for input(s): AST, ALT, ALB, BILITOT, ALKPHOS in the last 72 hours.     Current Facility-Administered Medications   Medication Dose Route Frequency Provider Last Rate Last Admin    LORazepam (ATIVAN) tablet 0.5 mg  0.5 mg Oral Once Pennington Hazard, DO        atorvastatin (LIPITOR) tablet 80 mg  80 mg Oral Nightly Jordin Labor, APRN - CNP   80 mg at 22    oxyCODONE (ROXICODONE) immediate release tablet 7.5 mg  7.5 mg Oral Q4H PRN Deatra Cambric PA-C        Or    oxyCODONE (ROXICODONE) immediate release tablet 5 mg  5 mg Oral Q4H PRN Deatra Cambric, PA-C        isosorbide dinitrate (ISORDIL) tablet 15 mg  15 mg Oral BID Ryland Jimenez PA-C   15 mg at 22 1035    aspirin suppository 300 mg  300 mg Rectal Daily TRA Srivastava CNP   300 mg at 22 1244    heparin (porcine) injection 5,000 Units  5,000 DEION Enriquez        glucagon (rDNA) injection 1 mg  1 mg SubCUTAneous PRN Eneida Saba PA-C        dextrose 10 % infusion   IntraVENous Continuous PRN Eneida Saba PA-C        insulin lispro (HUMALOG) injection vial 0-8 Units  0-8 Units SubCUTAneous TID WC Eneida Saba PA-C   6 Units at 09/02/22 1243    insulin lispro (HUMALOG) injection vial 0-4 Units  0-4 Units SubCUTAneous Nightly Eneida Saba PA-C   4 Units at 08/29/22 2049    melatonin disintegrating tablet 5 mg  5 mg Oral Nightly Eneida Saba PA-C   5 mg at 09/02/22 2053    sennosides-docusate sodium (SENOKOT-S) 8.6-50 MG tablet 1 tablet  1 tablet Oral BID Eneida Saba PA-C   1 tablet at 09/02/22 2053    polyethylene glycol (GLYCOLAX) packet 17 g  17 g Oral Daily PRN Eneida Saba PA-C           Additional work up or/and treatment plan may be added today or then after based on clinical progression. I am managing a portion of pt care. Some medical issues are handled by other specialists. Additional work up and treatment should be done in out pt setting by pt PCP and other out pt providers. In addition to examining and evaluating pt, I spent additional time explaining care, normaland abnormal findings, and treatment plan. All of pt questions were answered. Counseling, diet and education were provided. Case will be discussed with nursing staff when appropriate. Family will be updated if and when appropriate.        Electronically signed by Elmira Muhammad DO on 9/3/2022 at 1:07 PM

## 2022-09-03 NOTE — PROGRESS NOTES
[address] [facility] Neurology Daily Progress Note  Name: Skylar Velasquez  Age: 80 y.o. Gender: female  CodeStatus: DNR-CCA  Allergies: Tylenol [Acetaminophen]    Chief Complaint:Fall (Pt to the ED via EMS from [facility] with left hip pain after fall at home. Pt is alert but very [place]. Pt unable to straighten left leg and grabbing at left hip. Pt fell in SNF by tripping over a piece of furniture. )    Primary Care Provider: Familia Chowdhury MD  InpatientTreatment Team: Treatment Team: Attending Provider: Rosie Ibanez DO; Utilization Reviewer: Aggie Ordaz RN; Consulting Physician: TRA Brunner - SHAD; LPN: Climmie Najjar, LPN; Utilization Reviewer: Kelly Damon RN; Patient Care Tech: Pastrana Cristine; Consulting Physician: Savanah Perez MD; Registered Nurse: Raisa Bolivar RN  Admission Date: 8/29/2022      HPI   Pt seen and examined for neuro follow up for acute CVA. Patient is more awake today. Confused. Does not purposely follow commands. Speech minimally garbled. Left facial droop persist but improved. Left-sided hemiparesis noted. Left-sided neglect noted. No seizure activity reported. Speech eval completed patient on altered diet. Vitals:    09/03/22 0739   BP: (!) 127/48   Pulse: 63   Resp: 16   Temp: 98.1 °F (36.7 °C)   SpO2: 100%        Review of Systems   Unable to perform ROS: Mental status change   Constitutional:  Negative for fever. HENT:  Positive for hearing loss ([employer], bilat hearing aides) and trouble swallowing. Respiratory:  Negative for cough and wheezing. Cardiovascular:  Negative for leg swelling. Gastrointestinal:  Negative for abdominal distention and vomiting. Musculoskeletal:  Negative for gait problem. Skin:  Negative for color change and rash. Neurological:  Positive for facial asymmetry, speech difficulty and weakness. Negative for tremors, seizures and syncope. Psychiatric/Behavioral:  Positive for confusion.  Negative for agitation and acetaminophen, ondansetron **OR** ondansetron, aluminum & magnesium hydroxide-simethicone, sodium chloride flush, sodium chloride, trimethobenzamide, glucose, dextrose bolus **OR** dextrose bolus, glucagon (rDNA), dextrose, polyethylene glycol    Labs:   Recent Labs     09/01/22  0613 09/01/22  1125 09/01/22  1457 09/03/22  0520   WBC 6.4  --   --  6.8   HGB 6.9* 8.7* 8.5* 7.6*   HCT 21.3*  --  24.9* 22.9*   *  --   --  147       Recent Labs     09/01/22  0613 09/01/22  1125 09/03/22  0520 09/03/22  0721     --  135  --    K 4.1  --  5.9* 4.2     --  102  --    CO2 24  --  24  --    BUN 35*  --  36*  --    CREATININE 1.48* 1.6* 1.26*  --    CALCIUM 8.1*  --  7.7*  --        No results for input(s): AST, ALT, BILIDIR, BILITOT, ALKPHOS in the last 72 hours. No results for input(s): INR in the last 72 hours. No results for input(s): Mary Dross in the last 72 hours. Urinalysis:   Lab Results   Component Value Date/Time    NITRU Negative 05/15/2022 03:45 PM    WBCUA 3-5 05/15/2022 03:45 PM    BACTERIA Negative 05/15/2022 03:45 PM    RBCUA 3-5 05/15/2022 03:45 PM    BLOODU Negative 05/15/2022 03:45 PM    SPECGRAV 1.020 05/15/2022 03:45 PM    GLUCOSEU >=1000 05/15/2022 03:45 PM       Radiology:   Most recent    EEG No valid procedures specified. MRI of Brain No results found for this or any previous visit. No results found for this or any previous visit. MRA of the Head and Neck: No results found for this or any previous visit. No results found for this or any previous visit. No results found for this or any previous visit. CT of the Head: Results for orders placed during the hospital encounter of 08/29/22    CT HEAD WO CONTRAST    Narrative  INDICATION: 66-year-old female found unresponsive. COMPARISON: 5/15/2022.     TECHNIQUE: Multidetector CT imaging was obtained from the skull base to vertex without the administration of intravenous contrast. Coronal and sagittal reformatted images were obtained. Automated dose exposure control was used for this exam.    FINDINGS:    No evidence of parenchymal hemorrhages or contusions. No evidence of intra or extra-axial fluid collection is seen. Areas of low-attenuation are visualized in the periventricular and subcortical white matter demonstrating no change in comparison to the prior study, scattered chronic lacunar infarcts seen, findings consistent with chronic microvascular disease. No  evidence of acute territorial infarct is seen. Prominence of the ventricles and sulci are visualized demonstrating no change in comparison to the prior study consistent with chronic atrophic brain changes. Visualized paranasal sinuses are well aerated. Visualized mastoid air cells are well aerated. The calvarium is intact. Impression  No acute intracranial hemorrhage or mass effect. Chronic atrophic brain changes and chronic microvascular disease. No results found for this or any previous visit. No results found for this or any previous visit. Carotid duplex: No results found for this or any previous visit. No results found for this or any previous visit. Results for orders placed during the hospital encounter of 07/31/18    US Carotid Artery Bilateral    Narrative  EXAMINATION: US CAROTID ARTERY BILATERAL:    DATE AND TIME: 7/31/2018 at 10:15 AM.    CLINICAL HISTORY: CAROTID ARTERY STENOSIS. BRUIT OF RIGHT CAROTID ARTERY. COMPARISON: None. TECHNIQUE: Carotid duplex sonograms which include gray scale and color flow evaluation are complimented with spectral waveform analysis. Please refer to chart below for specific carotid velocity measurements. The degree of stenosis recorded on this exam  uses the same method of stratification used in the NASCET trials.  This complies with ACR practice guidelines and the Society of radiology in ultrasound consensus statement and provides adequate information for clinical decision making. Society of  Radiologists in Ultrasound (SRU) consensus statement was used to estimate internal carotid artery stenosis. FINDINGS: There is some heterogeneous calcified plaque in the proximal internal carotid arteries bilaterally. No significant increase in systolic flow or turbulence to indicate any hemodynamically significant narrowing in the right or left internal  carotid arteries. There is antegrade flow identified in both vertebral arteries. ARTERIAL BLOOD FLOW VELOCITY    RIGHT PS    Prox CCA    62 cm/s  Mid CCA     63 cm/s  Dist CCA    86 cm/s  Prox ICA    55 cm/s  Mid ICA      76 cm/s  Dist ICA     62 cm/s  Prox ECA    109 cm/s  Prox VERT   antegrade    ICA/CCA     1.21    LEFT PS    Prox CCA    95 cm/s  Mid CCA     85 cm/s  Dist CCA    84 cm/s  Prox ICA    98 cm/s  Mid ICA      64 cm/s  Dist ICA      60 cm/s  Prox ECA    111 cm/s  Prox VERT   antegrade    ICA/CCA     1.15    Impression  <50  % STENOSIS IN THE RIGHT ICA    <50 % STENOSIS IN THE LEFT ICA      Echo No results found for this or any previous visit. Assessment/Plan:    9/1/22:  Patient is a 70-year-old female with past medical history of hypertension, diabetes mellitus type 2, cardiomyopathy, CAD with stent, atrial fibrillation, anemia, pacemaker insertion who suffered a mechanical fall from standing resulting in acute comminuted intra-articular left distal femur fracture. No head strike reported. Patient underwent open reduction internal fixation of the left distal femur fracture on 8/30/2022. At approximately noon yesterday patient became lethargic and unresponsive. She remains that way currently. Upon exam she appears to have left facial droop and flaccid left upper extremity. Findings are concerning for acute CVA. Stat CT of the head was negative for acute findings. ABGs negative. No hypoglycemia. We will obtain MRI of the brain and carotid duplex.   Will initiate aspirin rectally. Further recommendations to follow pending MRI.    9/2/22:  CVA with left-sided weakness, left facial droop, left-sided neglect  MRI of the brain remains pending. Pacemaker is compatible. Patient with history of atrial fibrillation we will place on telemetry  Carotid duplex completed with report pending  Check lipid panel and hemoglobin A1c  Add intensity statin therapy    9/3/22:  MRI of the brain still not done. MRI held up due to coordination with pacemaker. We will repeat CT of the head. Carotid duplex negative for significant stenosis. Hemoglobin A1c 12.2  Low HDL of 34, LDL 61  Continue aspirin and statin therapy.   Collaborating physicians: Dr ROBLEDO    Electronically signed by TRA Garcia CNP on 9/3/2022 at 12:46 PM

## 2022-09-04 PROCEDURE — 2700000000 HC OXYGEN THERAPY PER DAY

## 2022-09-15 NOTE — PROGRESS NOTES
Subjective:      Patient ID: Cammy Rodríguez is a pleasant 80 y.o. female who presents today for:  No chief complaint on file. [facility]    Patient seen today for recent history of hyperglycemia/DM type II. Patient did have her insulin reviewed today. She is frequently getting blood glucose levels of 512, 388, 333, 397 mg/dL over the last 2 days for example. Frequently at high and dynamic. Vital signs overall today 138/84, 16 RR, 98% SPO2 on room air BG = 240 mg/dL. Sliding scale currently 150-200 = 2 units; 201-250 = 4 units; 251-300 = 6 units; 301-350 = 8 units; 351-400 = 10 units; >400 mg/dL = 10 units and call ARISTIDES Lantus currently 19 units subcu every morning and 7 units subcu nightly. We will plan on doing a dose adjustment to Lantus and monitoring a hemoglobin A1c over the next week.       Patient Active Problem List   Diagnosis    Ischemic cardiomyopathy    CAD (coronary artery disease)    Hypertension    Pure hypercholesterolemia    Closed nondisp transvrs fracture of right patella with routine healing    Diabetes mellitus ([address]    Pacemaker    Cardiomyopathy ([address]    Closed fracture of right distal radius and ulna    Closed nondisplaced transverse fracture of right patella with routine healing    Type 2 diabetes mellitus with hyperosmolar hyperglycemic state (HHS) (HCC)    Hyperglycemia    Acute pain due to trauma    Post-op pain    Closed fracture of left distal femur ([address]    Cerebrovascular accident (CVA) ([address]     Past Medical History:   Diagnosis Date    Anemia     Atrial fibrillation ([address]     CAD (coronary artery disease)     Cardiomyopathy ([address]     CHF (congestive heart failure) ([address]     Diabetes mellitus ([address]     Hypertension      Past Surgical History:   Procedure Laterality Date    APPENDECTOMY      CORONARY ANGIOPLASTY WITH STENT PLACEMENT      FEMUR FRACTURE SURGERY Left 8/30/2022    OPEN REDUCTION INTERNAL FIXATION LEFT DISTAL FEMORAL FRACTURE performed by Barrett Prather Fausto Perez MD at [facility] Right 5/13/2021    CLOSED  REDUCTION INTERNAL FIXATION RIGHT  DISTAL RADIUS PINNING performed by Harper Loomis MD at [address] Left 12/2012    PACEMAKER INSERTION      TONSILLECTOMY      WRIST FRACTURE SURGERY Left 12/2012     Social History     Socioeconomic History    Marital status:      Spouse name: Not on file    Number of children: Not on file    Years of education: Not on file    Highest education level: Not on file   Occupational History    Not on file   Tobacco Use    Smoking status: Former    Smokeless tobacco: Never   Vaping Use    Vaping Use: Never used   Substance and Sexual Activity    Alcohol use: Not Currently    Drug use: Never    Sexual activity: Not Currently   Other Topics Concern    Not on file   Social History Narrative    Not on file     Social Determinants of Health     Financial Resource Strain: Not on file   Food Insecurity: Not on file   Transportation Needs: Not on file   Physical Activity: Not on file   Stress: Not on file   Social Connections: Not on file   Intimate Partner Violence: Not on file   Housing Stability: Not on file     No family history on file. Allergies   Allergen Reactions    Tylenol [Acetaminophen] Nausea And Vomiting and Rash         Review of Systems   Unable to perform ROS: Dementia     Objective:   VS: See HPI. Physical Exam  Vitals reviewed. Constitutional:       General: She is not in acute distress. Appearance: Normal appearance. She is normal weight. She is not ill-appearing. HENT:      Head: Normocephalic. Nose: Nose normal. No congestion or rhinorrhea. Mouth/Throat:      Mouth: Mucous membranes are moist.      Pharynx: Oropharynx is clear. Eyes:      Conjunctiva/sclera: Conjunctivae normal.      Pupils: Pupils are equal, round, and reactive to light.       Comments: Minimally reactive to light     Cardiovascular:      Rate and Rhythm: Normal rate and regular rhythm. Pulmonary:      Effort: Pulmonary effort is normal.      Breath sounds: Normal breath sounds. Abdominal:      General: Bowel sounds are normal. There is no distension. Palpations: Abdomen is soft. Skin:     General: Skin is warm and dry. Neurological:      Mental Status: She is alert. Mental status is at baseline. She is disoriented. Motor: Weakness present. Gait: Gait abnormal.   Psychiatric:         Mood and Affect: Mood normal.       Assessment:       Diagnosis Orders   1. Type 2 diabetes mellitus with hyperosmolarity without coma, with long-term current use of insulin ([address]        2. Hyperglycemia due to diabetes mellitus (HCC)              Plan:     Increase Lantus to 13 units every morning, nighttime dose remain the same. Keep sliding scale dosing the same for the time being, will investigate for possible dose increase if hypoglycemia continues. Hemoglobin A1c in 1 week to evaluate and establish current baseline. No follow-ups on file. Side effects, adverse effects of the medication prescribed today, as well as treatment plan and result expectations have been discussed withthe patient who expresses understanding and desires to proceed.     Elinor Smith

## 2022-09-18 PROBLEM — L03.119 RECURRENT CELLULITIS OF LOWER EXTREMITY: Status: ACTIVE | Noted: 2022-09-18

## 2022-09-18 ASSESSMENT — ENCOUNTER SYMPTOMS
DIARRHEA: 0
ABDOMINAL DISTENTION: 1
ABDOMINAL PAIN: 0
STRIDOR: 0
SHORTNESS OF BREATH: 0
COLOR CHANGE: 1
COUGH: 0
WHEEZING: 0

## 2022-09-18 NOTE — PROGRESS NOTES
Subjective:      Patient ID: Patric Barthel is a pleasant 80 y.o. female who presents today for:  Chief Complaint   Patient presents with    Other     Recurrent cellultiis, hypokalemia         [facility]  Patient seen today for high recurrent blood glucose levels. Did have recent increase in Lantus, however we will have to increase again. Patient does have cognitive decline, difficulty understanding adjustments, however will just very gradual to mitigate hypoglycemia. The orders for sliding scale and Lantus adjustments. We will follow-up  Facility.       Patient Active Problem List   Diagnosis    Ischemic cardiomyopathy    CAD (coronary artery disease)    Hypertension    Pure hypercholesterolemia    Closed nondisp transvrs fracture of right patella with routine healing    Diabetes mellitus ([address]    Pacemaker    Cardiomyopathy ([address]    Closed fracture of right distal radius and ulna    Closed nondisplaced transverse fracture of right patella with routine healing    Type 2 diabetes mellitus with hyperosmolar hyperglycemic state (HHS) (HCC)    Hyperglycemia    Acute pain due to trauma    Post-op pain    Closed fracture of left distal femur ([address]    Cerebrovascular accident (CVA) ([address]    Recurrent cellulitis of lower extremity     Past Medical History:   Diagnosis Date    Anemia     Atrial fibrillation ([address]     CAD (coronary artery disease)     Cardiomyopathy ([address]     CHF (congestive heart failure) ([address]     Diabetes mellitus ([address]     Hypertension      Past Surgical History:   Procedure Laterality Date    APPENDECTOMY      CORONARY ANGIOPLASTY WITH STENT PLACEMENT      FEMUR FRACTURE SURGERY Left 8/30/2022    OPEN REDUCTION INTERNAL FIXATION LEFT DISTAL FEMORAL FRACTURE performed by Jus Ritchie MD at [place] [address] Right 5/13/2021    CLOSED  REDUCTION INTERNAL FIXATION RIGHT  DISTAL RADIUS PINNING performed by Neda Chavez MD at [address] [facility] Left 12/2012 PACEMAKER INSERTION      TONSILLECTOMY      WRIST FRACTURE SURGERY Left 12/2012     Social History     Socioeconomic History    Marital status:      Spouse name: Not on file    Number of children: Not on file    Years of education: Not on file    Highest education level: Not on file   Occupational History    Not on file   Tobacco Use    Smoking status: Former    Smokeless tobacco: Never   Vaping Use    Vaping Use: Never used   Substance and Sexual Activity    Alcohol use: Not Currently    Drug use: Never    Sexual activity: Not Currently   Other Topics Concern    Not on file   Social History Narrative    Not on file     Social Determinants of Health     Financial Resource Strain: Not on file   Food Insecurity: Not on file   Transportation Needs: Not on file   Physical Activity: Not on file   Stress: Not on file   Social Connections: Not on file   Intimate Partner Violence: Not on file   Housing Stability: Not on file     No family history on file. Allergies   Allergen Reactions    Tylenol [Acetaminophen] Nausea And Vomiting and Rash         Review of Systems   Constitutional:  Negative for activity change, fatigue and unexpected weight change. Respiratory:  Negative for cough, shortness of breath, wheezing and stridor. Cardiovascular:  Positive for leg swelling. Negative for chest pain and palpitations. Gastrointestinal:  Positive for abdominal distention. Negative for abdominal pain and diarrhea. Genitourinary:  Negative for decreased urine volume, difficulty urinating, dysuria, frequency, hematuria, pelvic pain and urgency. Skin:  Positive for color change (mild redness BLE). Negative for rash and wound. Neurological:  Positive for weakness. Negative for dizziness, tremors, light-headedness, numbness and headaches. Psychiatric/Behavioral:  Negative for agitation, behavioral problems and confusion. All other systems reviewed and are negative. Objective:   VS: See [facility] for trends. Reviewed. Physical Exam  Vitals reviewed. Constitutional:       General: She is not in acute distress. Appearance: Normal appearance. She is normal weight. She is not ill-appearing. HENT:      Head: Normocephalic. Nose: Nose normal. No congestion or rhinorrhea. Mouth/Throat:      Mouth: Mucous membranes are moist.      Pharynx: Oropharynx is clear. Eyes:      Conjunctiva/sclera: Conjunctivae normal.      Pupils: Pupils are equal, round, and reactive to light. Comments: Minimally reactive to light     Cardiovascular:      Rate and Rhythm: Normal rate and regular rhythm. Pulmonary:      Effort: Pulmonary effort is normal.      Breath sounds: Normal breath sounds. Abdominal:      General: Bowel sounds are normal. There is no distension. Palpations: Abdomen is soft. Musculoskeletal:         General: Tenderness (BLE) present. Right lower leg: Edema present. Left lower leg: Edema present. Skin:     General: Skin is warm and dry. Findings: Erythema present. Neurological:      Mental Status: She is alert. Mental status is at baseline. She is disoriented. Motor: Weakness present. Gait: Gait abnormal.   Psychiatric:         Mood and Affect: Mood normal.         Behavior: Behavior normal.         Thought Content: Thought content normal.       Assessment:       Diagnosis Orders   1. Type 2 diabetes mellitus with hyperosmolar hyperglycemic state (HHS) ([facility]              Plan: Will make needed adjustments to Lantus to 17 units at at bedtime subQ. We will adjust sliding scale as well for mealtimes. See [facility] for updates. Monitor and follow-up next visit to facility. No follow-ups on file. [facility] [place]        Please note orders entered on site at facility after discussion with appropriate facility nursing/therapy/ / nutritional staff. Current longstanding medical problems and acute medical issues addressed with staff.  Available data and data elements in on site paper chart reviewed and analyzed. Current external consultant notes reviewed in on site chart. Ordered laboratory testing and imaging will be reviewed when available. This patient's need for psychiatric medication has been reviewed. Will consider further adjustment and possible further evaluations by mental health services. Side effects, adverse effects of the medication prescribed today, as well as treatment plan and result expectations have been discussed withthe patient who expresses understanding and desires to proceed.

## 2022-09-19 PROBLEM — Z86.79 H/O HEART DISORDER: Status: ACTIVE | Noted: 2022-09-19

## 2022-09-19 PROBLEM — E11.22 TYPE 2 DIABETES MELLITUS WITH CHRONIC KIDNEY DISEASE (HCC): Status: ACTIVE | Noted: 2022-09-19

## 2022-09-19 PROBLEM — Z79.899 HIGH RISK MEDICATION USE: Status: ACTIVE | Noted: 2022-09-19

## 2022-09-19 PROBLEM — R53.1 GENERAL WEAKNESS: Status: ACTIVE | Noted: 2021-06-29

## 2022-09-19 PROBLEM — N18.30 STAGE 3 CHRONIC KIDNEY DISEASE (HCC): Status: ACTIVE | Noted: 2018-11-13

## 2022-09-19 ASSESSMENT — ENCOUNTER SYMPTOMS
ABDOMINAL PAIN: 0
COLOR CHANGE: 0
DIARRHEA: 0
WHEEZING: 0
COUGH: 0
SHORTNESS OF BREATH: 0
STRIDOR: 0
ABDOMINAL DISTENTION: 1

## 2022-09-19 NOTE — PROGRESS NOTES
Subjective:      Patient ID: Tati Everett is a pleasant 80 y.o. female who presents today for:  No chief complaint on file. [facility]    Seen today to follow-up on recent insulin adjustment for DM type II. April Meza Blood glucose appears to be improving on average. Appears average is beginning to come down to the low 200s high 100s. We will continue adjustments as needed. Slow incremental adjustments needed secondary to patient's slowed mentation/decreased cognition and high risk for falls and hypoglycemia.       Patient Active Problem List   Diagnosis    Ischemic cardiomyopathy    CAD (coronary artery disease)    Hypertension    Pure hypercholesterolemia    Closed nondisp transvrs fracture of right patella with routine healing    Diabetes mellitus ([address]    Pacemaker    Cardiomyopathy ([address]    Closed fracture of right distal radius and ulna    Closed nondisplaced transverse fracture of right patella with routine healing    Type 2 diabetes mellitus with hyperosmolar hyperglycemic state (HHS) (HCC)    Hyperglycemia    Acute pain due to trauma    Post-op pain    Closed fracture of left distal femur ([address]    Cerebrovascular accident (CVA) ([address]    Recurrent cellulitis of lower extremity    General weakness    Stage 3 chronic kidney disease (HCC)    High risk medication use    H/O heart disorder    Type 2 diabetes mellitus with chronic kidney disease     Past Medical History:   Diagnosis Date    Anemia     Atrial fibrillation (HCC)     CAD (coronary artery disease)     Cardiomyopathy (HCC)     CHF (congestive heart failure) ([address]     Diabetes mellitus ([address]     Hypertension      Past Surgical History:   Procedure Laterality Date    APPENDECTOMY      CORONARY ANGIOPLASTY WITH STENT PLACEMENT      FEMUR FRACTURE SURGERY Left 8/30/2022    OPEN REDUCTION INTERNAL FIXATION LEFT DISTAL FEMORAL FRACTURE performed by Janie Govea MD at [place] [id number] Right 5/13/2021    CLOSED  REDUCTION INTERNAL FIXATION RIGHT  DISTAL RADIUS PINNING performed by Parish Francois MD at [address]  Left 12/2012    PACEMAKER INSERTION      TONSILLECTOMY      WRIST FRACTURE SURGERY Left 12/2012     Social History     Socioeconomic History    Marital status:      Spouse name: Not on file    Number of children: Not on file    Years of education: Not on file    Highest education level: Not on file   Occupational History    Not on file   Tobacco Use    Smoking status: Former    Smokeless tobacco: Never   Vaping Use    Vaping Use: Never used   Substance and Sexual Activity    Alcohol use: Not Currently    Drug use: Never    Sexual activity: Not Currently   Other Topics Concern    Not on file   Social History Narrative    Not on file     Social Determinants of Health     Financial Resource Strain: Not on file   Food Insecurity: Not on file   Transportation Needs: Not on file   Physical Activity: Not on file   Stress: Not on file   Social Connections: Not on file   Intimate Partner Violence: Not on file   Housing Stability: Not on file     No family history on file. Allergies   Allergen Reactions    Tylenol [Acetaminophen] Nausea And Vomiting and Rash         Review of Systems   Constitutional:  Negative for activity change, fatigue and unexpected weight change. Respiratory:  Negative for cough, shortness of breath, wheezing and stridor. Cardiovascular:  Negative for chest pain, palpitations and leg swelling. Gastrointestinal:  Positive for abdominal distention. Negative for abdominal pain and diarrhea. Genitourinary:  Negative for decreased urine volume, difficulty urinating, dysuria, frequency, hematuria, pelvic pain and urgency. Skin:  Negative for color change, rash and wound. Neurological:  Positive for weakness. Negative for dizziness, tremors, syncope, light-headedness, numbness and headaches. Psychiatric/Behavioral:  Negative for agitation, behavioral problems and confusion.     All other systems reviewed and are negative. Objective:   VS: See [address] Reviewed. Physical Exam  Vitals reviewed. Constitutional:       General: She is not in acute distress. Appearance: Normal appearance. She is normal weight. She is not ill-appearing. HENT:      Head: Normocephalic. Nose: Nose normal. No congestion or rhinorrhea. Mouth/Throat:      Mouth: Mucous membranes are moist.      Pharynx: Oropharynx is clear. Eyes:      Conjunctiva/sclera: Conjunctivae normal.      Pupils: Pupils are equal, round, and reactive to light. Comments: Minimally reactive to light     Cardiovascular:      Rate and Rhythm: Normal rate and regular rhythm. Pulmonary:      Effort: Pulmonary effort is normal.      Breath sounds: Normal breath sounds. Abdominal:      General: Bowel sounds are normal. There is no distension. Palpations: Abdomen is soft. Musculoskeletal:         General: Tenderness (BLE) present. Right lower leg: Edema present. Left lower leg: Edema present. Skin:     General: Skin is warm and dry. Findings: Erythema present. Neurological:      Mental Status: She is alert. Mental status is at baseline. She is disoriented. Sensory: Sensory deficit (mild decrease in light touch sensation (?d/t poor command following) present. Motor: Weakness present. Coordination: Coordination abnormal.      Gait: Gait abnormal.      Comments: A&O x2/3; slowed mentation   Psychiatric:         Mood and Affect: Mood normal.         Behavior: Behavior normal.       Assessment:       Diagnosis Orders   1. 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 At risk for hypoglycemia              Plan: We will maintain insulin as currently is, no new change orders. Continue to monitor Accu-Cheks.   If continues to have elevations or comes back higher than prior findings, will adjust Lantus and/or

## 2022-09-19 NOTE — PROGRESS NOTES
Subjective:      Patient ID: Cammy Rodríguez is a pleasant 80 y.o. female who presents today for:  No chief complaint on file. [address] [facility]    Patient seen for DM type II. Continues to have elevated blood glucose levels. Current Lantus levels not seeming to keep average BG suppressed throughout the day. We will make additional changes today to Lantus and reevaluate BG averages later this week.       Patient Active Problem List   Diagnosis    Ischemic cardiomyopathy    CAD (coronary artery disease)    Hypertension    Pure hypercholesterolemia    Closed nondisp transvrs fracture of right patella with routine healing    Diabetes mellitus ([address]    Pacemaker    Cardiomyopathy ([address]    Closed fracture of right distal radius and ulna    Closed nondisplaced transverse fracture of right patella with routine healing    Type 2 diabetes mellitus with hyperosmolar hyperglycemic state (HHS) (HCC)    Hyperglycemia    Acute pain due to trauma    Post-op pain    Closed fracture of left distal femur ([address]    Cerebrovascular accident (CVA) ([address]    Recurrent cellulitis of lower extremity    General weakness    Stage 3 chronic kidney disease (HCC)    High risk medication use    H/O heart disorder    Type 2 diabetes mellitus with chronic kidney disease     Past Medical History:   Diagnosis Date    Anemia     Atrial fibrillation (HCC)     CAD (coronary artery disease)     Cardiomyopathy (HCC)     CHF (congestive heart failure) ([address]     Diabetes mellitus ([address]     Hypertension      Past Surgical History:   Procedure Laterality Date    APPENDECTOMY      CORONARY ANGIOPLASTY WITH STENT PLACEMENT      FEMUR FRACTURE SURGERY Left 8/30/2022    OPEN REDUCTION INTERNAL FIXATION LEFT DISTAL FEMORAL FRACTURE performed by Noemi Burgos MD at [place] [id number] Right 5/13/2021    CLOSED  REDUCTION INTERNAL FIXATION RIGHT  DISTAL RADIUS PINNING performed by Nelson Goodman MD at [facility] Left 12/2012    PACEMAKER INSERTION      TONSILLECTOMY      WRIST FRACTURE SURGERY Left 12/2012     Social History     Socioeconomic History    Marital status:      Spouse name: Not on file    Number of children: Not on file    Years of education: Not on file    Highest education level: Not on file   Occupational History    Not on file   Tobacco Use    Smoking status: Former    Smokeless tobacco: Never   Vaping Use    Vaping Use: Never used   Substance and Sexual Activity    Alcohol use: Not Currently    Drug use: Never    Sexual activity: Not Currently   Other Topics Concern    Not on file   Social History Narrative    Not on file     Social Determinants of Health     Financial Resource Strain: Not on file   Food Insecurity: Not on file   Transportation Needs: Not on file   Physical Activity: Not on file   Stress: Not on file   Social Connections: Not on file   Intimate Partner Violence: Not on file   Housing Stability: Not on file     No family history on file. Allergies   Allergen Reactions    Tylenol [Acetaminophen] Nausea And Vomiting and Rash         Review of Systems   Unable to perform ROS: Dementia     Objective:     VS: 130/66, 88 bpm, 90% SPO2 on room air, 18 RR, W = 108.2 LBS. Physical Exam  Vitals reviewed. Constitutional:       General: She is not in acute distress. Appearance: Normal appearance. She is normal weight. She is not ill-appearing. HENT:      Head: Normocephalic. Nose: Nose normal. No congestion or rhinorrhea. Eyes:      Comments: Minimally reactive to light     Cardiovascular:      Rate and Rhythm: Normal rate and regular rhythm. Pulmonary:      Effort: Pulmonary effort is normal.      Breath sounds: Normal breath sounds. Abdominal:      General: Bowel sounds are normal. There is no distension. Palpations: Abdomen is soft. Musculoskeletal:      Right lower leg: Edema present. Left lower leg: Edema present. Skin:     General: Skin is warm and dry. Coloration: Skin is pale. Findings: No erythema. Neurological:      Mental Status: She is alert. Mental status is at baseline. She is disoriented. Sensory: Sensory deficit (mild decrease in light touch sensation (?d/t poor command following) present. Motor: Weakness present. Coordination: Coordination abnormal.      Gait: Gait abnormal.      Comments: A&O x2/3; slowed mentation   Psychiatric:         Mood and Affect: Mood normal.         Behavior: Behavior normal.       Assessment:       Diagnosis Orders   1. Type 2 diabetes mellitus without complication, with long-term current use of insulin ([facility])              Plan:     Latest changed to 19 units subcu every morning, 7 units at at bedtime. Monitor blood glucose levels via Accu-Chek and will follow up for any needed changes. Continue Januvia at current dose. No follow-ups on file. Star Mckee, [id number]        Please note orders entered on site at facility after discussion with appropriate facility nursing/therapy/ / nutritional staff. Current longstanding medical problems and acute medical issues addressed with staff. Available data and data elements in on site paper chart reviewed and analyzed. Current external consultant notes reviewed in on site chart. Ordered laboratory testing and imaging will be reviewed when available. This patient's need for psychiatric medication has been reviewed. Will consider further adjustment and possible further evaluations by mental health services. Side effects, adverse effects of the medication prescribed today, as well as treatment plan and result expectations have been discussed withthe patient who expresses understanding and desires to proceed.

## (undated) DEVICE — SPONGE,LAP,18"X18",DLX,XR,ST,5/PK,40/PK: Brand: MEDLINE

## (undated) DEVICE — TOWEL,OR,DSP,ST,BLUE,STD,4/PK,20PK/CS: Brand: MEDLINE

## (undated) DEVICE — BIT DRL L110MM DIA2.5MM G QUIK CPL W/O STP REUSE

## (undated) DEVICE — BANDAGE COMPR W4INXL5YD WHT BGE POLY COT M E WRP WV HK AND

## (undated) DEVICE — GLOVE ORANGE PI 7   MSG9070

## (undated) DEVICE — Device

## (undated) DEVICE — YANKAUER,SMOOTH HANDLE,HIGH CAPACITY: Brand: MEDLINE INDUSTRIES, INC.

## (undated) DEVICE — BIT DRL L180MM DIA4.3MM QUIK CPL W/O STP REUSE

## (undated) DEVICE — GLOVE ORANGE PI 7 1/2   MSG9075

## (undated) DEVICE — APPLICATOR MEDICATED 26 CC SOLUTION HI LT ORNG CHLORAPREP

## (undated) DEVICE — KIT ARMOR C DRP COLLAPSIBLE AND SELF EXP TOP CVR FOR FLUOROSCOPIC

## (undated) DEVICE — 3M™ IOBAN™ 2 ANTIMICROBIAL INCISE DRAPE 6650EZ: Brand: IOBAN™ 2

## (undated) DEVICE — PADDING UNDERCAST W4INXL12FT RAYON POLY SYN NONADHESIVE

## (undated) DEVICE — CORD,CAUTERY,BIPOLAR,STERILE: Brand: MEDLINE

## (undated) DEVICE — PAD,ABDOMINAL,8"X10",ST,LF: Brand: MEDLINE

## (undated) DEVICE — NEPTUNE E-SEP SMOKE EVACUATION PENCIL, COATED, 70MM BLADE, PUSH BUTTON SWITCH: Brand: NEPTUNE E-SEP

## (undated) DEVICE — COVER LT HNDL BLU PLAS

## (undated) DEVICE — ELECTRODE ES L275IN 275IN BLDE TIP COAT PTFE TEF W EVAC

## (undated) DEVICE — 3M™ STERI-DRAPE™ U-DRAPE 1015: Brand: STERI-DRAPE™

## (undated) DEVICE — ELECTRODE PT RET AD L9FT HI MOIST COND ADH HYDRGEL CORDED

## (undated) DEVICE — BIT DRL L110MM DIA3.5MM QUIK CPL W/O STP REUSE

## (undated) DEVICE — SYRINGE IRRIG 60ML SFT PLIABLE BLB EZ TO GRP 1 HND USE W/

## (undated) DEVICE — GOWN,SIRUS,POLYRNF,BRTHSLV,XLN/XL,20/CS: Brand: MEDLINE

## (undated) DEVICE — SINGLE PORT MANIFOLD: Brand: NEPTUNE 2

## (undated) DEVICE — BANDAGE,GAUZE,CONFORMING,4"X75",STRL,LF: Brand: MEDLINE

## (undated) DEVICE — TUBING, SUCTION, 1/4" X 10', STRAIGHT: Brand: MEDLINE

## (undated) DEVICE — STAPLER SKIN H3.9MM WIRE DIA0.58MM CRWN 6.9MM 35 STPL FIX

## (undated) DEVICE — GLOVE ORTHO 7 1/2   MSG9475

## (undated) DEVICE — C-ARM: Brand: UNBRANDED

## (undated) DEVICE — GOWN,AURORA,NONREINFORCED,LARGE: Brand: MEDLINE

## (undated) DEVICE — SHEET,DRAPE,53X77,STERILE: Brand: MEDLINE

## (undated) DEVICE — ZIMMER® STERILE DISPOSABLE TOURNIQUET CUFF, DUAL PORT, SINGLE BLADDER, 18 IN. (46 CM)

## (undated) DEVICE — MARKER SURG SKIN GENTIAN VLT REG TIP W/ 6IN RUL

## (undated) DEVICE — BIT DRL L145MM DIA3.2MM QUIK CPL W/O STP REUSE

## (undated) DEVICE — SUTURE VCRL SZ 2-0 L36IN ABSRB UD L36MM CT-1 1/2 CIR J945H

## (undated) DEVICE — GLOVE ORANGE PI 8 1/2   MSG9085

## (undated) DEVICE — SPONGE GZ W4XL4IN COT 12 PLY TYP VII WVN C FLD DSGN

## (undated) DEVICE — BIT DRL L300MM DIA4.3MM QUIK CPL PERC CALIB W/O STP REUSE

## (undated) DEVICE — DRESSING GZ W1XL8IN COT XRFRM N ADH OVERWRAP CURAD

## (undated) DEVICE — HAND II: Brand: MEDLINE INDUSTRIES, INC.

## (undated) DEVICE — PADDING CAST W4INXL4YD HIGHLY ABSRB THAN COT EZ APPL

## (undated) DEVICE — 1010 S-DRAPE TOWEL DRAPE 10/BX: Brand: STERI-DRAPE™

## (undated) DEVICE — SUTURE VCRL SZ 0 L36IN ABSRB UD L36MM CT-1 1/2 CIR J946H

## (undated) DEVICE — INTENDED FOR TISSUE SEPARATION, AND OTHER PROCEDURES THAT REQUIRE A SHARP SURGICAL BLADE TO PUNCTURE OR CUT.: Brand: BARD-PARKER ® CARBON RIB-BACK BLADES

## (undated) DEVICE — SUTURE ABSORBABLE BRAIDED 0 CT-1 8X27 IN UD VICRYL JJ41G

## (undated) DEVICE — BANDAGE COMPR M W6INXL10YD WHT BGE VELC E MTRX HK AND LOOP

## (undated) DEVICE — COUNTER NDL 40 COUNT HLD 70 FOAM BLK ADH W/ MAG

## (undated) DEVICE — PACK,ARTHROSCOPY II,SIRUS: Brand: MEDLINE

## (undated) DEVICE — PADDING CAST W6INXL4YD RAYON UNDERCAST SOF-ROL

## (undated) DEVICE — GUIDEWIRE ORTH DIA2.5MM LOK SMOOTH DRL TIP FLX THRD FOR